# Patient Record
Sex: MALE | Race: WHITE | URBAN - METROPOLITAN AREA
[De-identification: names, ages, dates, MRNs, and addresses within clinical notes are randomized per-mention and may not be internally consistent; named-entity substitution may affect disease eponyms.]

---

## 2018-07-04 ENCOUNTER — INPATIENT (INPATIENT)
Facility: HOSPITAL | Age: 58
LOS: 0 days | Discharge: HOME | End: 2018-07-05
Attending: INTERNAL MEDICINE | Admitting: INTERNAL MEDICINE
Payer: COMMERCIAL

## 2018-07-04 VITALS
HEART RATE: 101 BPM | OXYGEN SATURATION: 99 % | SYSTOLIC BLOOD PRESSURE: 161 MMHG | TEMPERATURE: 98 F | WEIGHT: 195.11 LBS | HEIGHT: 71 IN | DIASTOLIC BLOOD PRESSURE: 93 MMHG | RESPIRATION RATE: 18 BRPM

## 2018-07-04 DIAGNOSIS — Z90.5 ACQUIRED ABSENCE OF KIDNEY: Chronic | ICD-10-CM

## 2018-07-04 LAB
ALBUMIN SERPL ELPH-MCNC: 4 G/DL — SIGNIFICANT CHANGE UP (ref 3.3–5)
ALP SERPL-CCNC: 70 U/L — SIGNIFICANT CHANGE UP (ref 40–120)
ALT FLD-CCNC: 48 U/L — SIGNIFICANT CHANGE UP (ref 12–78)
ANION GAP SERPL CALC-SCNC: 7 MMOL/L — SIGNIFICANT CHANGE UP (ref 5–17)
APTT BLD: 32.3 SEC — SIGNIFICANT CHANGE UP (ref 27.5–37.4)
AST SERPL-CCNC: 27 U/L — SIGNIFICANT CHANGE UP (ref 15–37)
BASOPHILS # BLD AUTO: 0.06 K/UL — SIGNIFICANT CHANGE UP (ref 0–0.2)
BASOPHILS NFR BLD AUTO: 0.7 % — SIGNIFICANT CHANGE UP (ref 0–2)
BILIRUB SERPL-MCNC: 1.3 MG/DL — HIGH (ref 0.2–1.2)
BLD GP AB SCN SERPL QL: SIGNIFICANT CHANGE UP
BUN SERPL-MCNC: 19 MG/DL — SIGNIFICANT CHANGE UP (ref 7–23)
CALCIUM SERPL-MCNC: 9.3 MG/DL — SIGNIFICANT CHANGE UP (ref 8.5–10.1)
CHLORIDE SERPL-SCNC: 105 MMOL/L — SIGNIFICANT CHANGE UP (ref 96–108)
CO2 SERPL-SCNC: 25 MMOL/L — SIGNIFICANT CHANGE UP (ref 22–31)
CREAT SERPL-MCNC: 1.04 MG/DL — SIGNIFICANT CHANGE UP (ref 0.5–1.3)
EOSINOPHIL # BLD AUTO: 0.09 K/UL — SIGNIFICANT CHANGE UP (ref 0–0.5)
EOSINOPHIL NFR BLD AUTO: 1.1 % — SIGNIFICANT CHANGE UP (ref 0–6)
GLUCOSE SERPL-MCNC: 264 MG/DL — HIGH (ref 70–99)
HCT VFR BLD CALC: 45.8 % — SIGNIFICANT CHANGE UP (ref 39–50)
HGB BLD-MCNC: 16.2 G/DL — SIGNIFICANT CHANGE UP (ref 13–17)
IMM GRANULOCYTES NFR BLD AUTO: 0.5 % — SIGNIFICANT CHANGE UP (ref 0–1.5)
INR BLD: 1.07 RATIO — SIGNIFICANT CHANGE UP (ref 0.88–1.16)
LACTATE SERPL-SCNC: 2 MMOL/L — SIGNIFICANT CHANGE UP (ref 0.7–2)
LYMPHOCYTES # BLD AUTO: 1.79 K/UL — SIGNIFICANT CHANGE UP (ref 1–3.3)
LYMPHOCYTES # BLD AUTO: 21.9 % — SIGNIFICANT CHANGE UP (ref 13–44)
MCHC RBC-ENTMCNC: 28.7 PG — SIGNIFICANT CHANGE UP (ref 27–34)
MCHC RBC-ENTMCNC: 35.4 GM/DL — SIGNIFICANT CHANGE UP (ref 32–36)
MCV RBC AUTO: 81.2 FL — SIGNIFICANT CHANGE UP (ref 80–100)
MONOCYTES # BLD AUTO: 0.71 K/UL — SIGNIFICANT CHANGE UP (ref 0–0.9)
MONOCYTES NFR BLD AUTO: 8.7 % — SIGNIFICANT CHANGE UP (ref 2–14)
NEUTROPHILS # BLD AUTO: 5.5 K/UL — SIGNIFICANT CHANGE UP (ref 1.8–7.4)
NEUTROPHILS NFR BLD AUTO: 67.1 % — SIGNIFICANT CHANGE UP (ref 43–77)
NRBC # BLD: 0 /100 WBCS — SIGNIFICANT CHANGE UP (ref 0–0)
PLATELET # BLD AUTO: 195 K/UL — SIGNIFICANT CHANGE UP (ref 150–400)
POTASSIUM SERPL-MCNC: 4.3 MMOL/L — SIGNIFICANT CHANGE UP (ref 3.5–5.3)
POTASSIUM SERPL-SCNC: 4.3 MMOL/L — SIGNIFICANT CHANGE UP (ref 3.5–5.3)
PROT SERPL-MCNC: 8 GM/DL — SIGNIFICANT CHANGE UP (ref 6–8.3)
PROTHROM AB SERPL-ACNC: 11.6 SEC — SIGNIFICANT CHANGE UP (ref 9.8–12.7)
RBC # BLD: 5.64 M/UL — SIGNIFICANT CHANGE UP (ref 4.2–5.8)
RBC # FLD: 13.3 % — SIGNIFICANT CHANGE UP (ref 10.3–14.5)
SODIUM SERPL-SCNC: 137 MMOL/L — SIGNIFICANT CHANGE UP (ref 135–145)
TYPE + AB SCN PNL BLD: SIGNIFICANT CHANGE UP
WBC # BLD: 8.19 K/UL — SIGNIFICANT CHANGE UP (ref 3.8–10.5)
WBC # FLD AUTO: 8.19 K/UL — SIGNIFICANT CHANGE UP (ref 3.8–10.5)

## 2018-07-04 PROCEDURE — 93010 ELECTROCARDIOGRAM REPORT: CPT

## 2018-07-04 PROCEDURE — 73620 X-RAY EXAM OF FOOT: CPT | Mod: 26

## 2018-07-04 PROCEDURE — 71045 X-RAY EXAM CHEST 1 VIEW: CPT | Mod: 26

## 2018-07-04 PROCEDURE — 99285 EMERGENCY DEPT VISIT HI MDM: CPT

## 2018-07-04 RX ORDER — SODIUM CHLORIDE 9 MG/ML
1000 INJECTION INTRAMUSCULAR; INTRAVENOUS; SUBCUTANEOUS
Qty: 0 | Refills: 0 | Status: COMPLETED | OUTPATIENT
Start: 2018-07-04 | End: 2018-07-05

## 2018-07-04 RX ORDER — DOCUSATE SODIUM 100 MG
100 CAPSULE ORAL THREE TIMES A DAY
Qty: 0 | Refills: 0 | Status: DISCONTINUED | OUTPATIENT
Start: 2018-07-04 | End: 2018-07-05

## 2018-07-04 RX ORDER — AMPICILLIN SODIUM AND SULBACTAM SODIUM 250; 125 MG/ML; MG/ML
3 INJECTION, POWDER, FOR SUSPENSION INTRAMUSCULAR; INTRAVENOUS EVERY 6 HOURS
Qty: 0 | Refills: 0 | Status: DISCONTINUED | OUTPATIENT
Start: 2018-07-04 | End: 2018-07-05

## 2018-07-04 RX ORDER — LISINOPRIL 2.5 MG/1
10 TABLET ORAL DAILY
Qty: 0 | Refills: 0 | Status: DISCONTINUED | OUTPATIENT
Start: 2018-07-04 | End: 2018-07-05

## 2018-07-04 RX ORDER — ENOXAPARIN SODIUM 100 MG/ML
40 INJECTION SUBCUTANEOUS EVERY 24 HOURS
Qty: 0 | Refills: 0 | Status: DISCONTINUED | OUTPATIENT
Start: 2018-07-04 | End: 2018-07-05

## 2018-07-04 RX ORDER — ACETAMINOPHEN 500 MG
650 TABLET ORAL EVERY 6 HOURS
Qty: 0 | Refills: 0 | Status: DISCONTINUED | OUTPATIENT
Start: 2018-07-04 | End: 2018-07-05

## 2018-07-04 RX ORDER — DEXTROSE 50 % IN WATER 50 %
25 SYRINGE (ML) INTRAVENOUS ONCE
Qty: 0 | Refills: 0 | Status: DISCONTINUED | OUTPATIENT
Start: 2018-07-04 | End: 2018-07-05

## 2018-07-04 RX ORDER — DEXTROSE 50 % IN WATER 50 %
15 SYRINGE (ML) INTRAVENOUS ONCE
Qty: 0 | Refills: 0 | Status: DISCONTINUED | OUTPATIENT
Start: 2018-07-04 | End: 2018-07-05

## 2018-07-04 RX ORDER — DEXTROSE 50 % IN WATER 50 %
12.5 SYRINGE (ML) INTRAVENOUS ONCE
Qty: 0 | Refills: 0 | Status: DISCONTINUED | OUTPATIENT
Start: 2018-07-04 | End: 2018-07-05

## 2018-07-04 RX ORDER — INSULIN LISPRO 100/ML
VIAL (ML) SUBCUTANEOUS
Qty: 0 | Refills: 0 | Status: DISCONTINUED | OUTPATIENT
Start: 2018-07-04 | End: 2018-07-05

## 2018-07-04 RX ORDER — PIPERACILLIN AND TAZOBACTAM 4; .5 G/20ML; G/20ML
3.38 INJECTION, POWDER, LYOPHILIZED, FOR SOLUTION INTRAVENOUS ONCE
Qty: 0 | Refills: 0 | Status: COMPLETED | OUTPATIENT
Start: 2018-07-04 | End: 2018-07-04

## 2018-07-04 RX ORDER — GLUCAGON INJECTION, SOLUTION 0.5 MG/.1ML
1 INJECTION, SOLUTION SUBCUTANEOUS ONCE
Qty: 0 | Refills: 0 | Status: DISCONTINUED | OUTPATIENT
Start: 2018-07-04 | End: 2018-07-05

## 2018-07-04 RX ORDER — SODIUM CHLORIDE 9 MG/ML
1000 INJECTION, SOLUTION INTRAVENOUS
Qty: 0 | Refills: 0 | Status: DISCONTINUED | OUTPATIENT
Start: 2018-07-04 | End: 2018-07-05

## 2018-07-04 RX ORDER — ONDANSETRON 8 MG/1
4 TABLET, FILM COATED ORAL EVERY 6 HOURS
Qty: 0 | Refills: 0 | Status: DISCONTINUED | OUTPATIENT
Start: 2018-07-04 | End: 2018-07-05

## 2018-07-04 RX ORDER — VANCOMYCIN HCL 1 G
1000 VIAL (EA) INTRAVENOUS ONCE
Qty: 0 | Refills: 0 | Status: COMPLETED | OUTPATIENT
Start: 2018-07-04 | End: 2018-07-04

## 2018-07-04 RX ORDER — SENNA PLUS 8.6 MG/1
2 TABLET ORAL AT BEDTIME
Qty: 0 | Refills: 0 | Status: DISCONTINUED | OUTPATIENT
Start: 2018-07-04 | End: 2018-07-05

## 2018-07-04 RX ADMIN — Medication 250 MILLIGRAM(S): at 16:15

## 2018-07-04 RX ADMIN — PIPERACILLIN AND TAZOBACTAM 200 GRAM(S): 4; .5 INJECTION, POWDER, LYOPHILIZED, FOR SOLUTION INTRAVENOUS at 15:50

## 2018-07-04 RX ADMIN — Medication 1 APPLICATION(S): at 20:55

## 2018-07-04 NOTE — ED ADULT NURSE NOTE - OBJECTIVE STATEMENT
presents sent from urgent care for left 5th toe pain. as per pt, he injured it a few days ago but now the redness is moving up his leg. denies fevers, chills.

## 2018-07-04 NOTE — H&P ADULT - ASSESSMENT
57 y/o M PMHx significant for diabetes mellitus type 2, HTN, s/p partial left nephrectomy for renal cell carcinoma who was referred to the ED from a local urgent care center for further evaluation of left 5th toe swelling and ascending erythema to the mid left lower leg . The patient states that he had a "blister which popped" on the left 5th toe on Monday (7/2) which he noticed after taking off his "work boots." The patient denies any associated fevers, chills, paresthesias, or numbness to the left leg/foot. In the ED Podiatry was consulted for further management 57 y/o M PMHx significant for diabetes mellitus type 2, HTN, s/p partial left nephrectomy for renal cell carcinoma who was referred to the ED from a local urgent care center for further evaluation of left 5th toe swelling and ascending erythema to the mid left lower leg . The patient states that he had a "blister which popped" on the left 5th toe on Monday (7/2) which he noticed after taking off his "work boots." The patient denies any associated fevers, chills, paresthesias, or numbness to the left leg/foot. In the ED Podiatry was consulted for further management.    #Cellulitis/Lymphangitis; Left 5th toe and left lower leg  #Superficial ulcer (grade 1); left foot 5th digit  ~admit to Medicine  ~f/u w/ Podiatry in the am  ~cont. wound care per Podiatry  ~f/u PAN C+S  ~cont. IV abx  ~f/u w/ ID consultation in the am    2)Diabetes Mellitus type 2  ~FS qAC/HS  ~cont. ADA diet  ~cont. ISS per protocol    3)HTN  ~cont. Lisinopril 10mg po daily    4)Vte ppx  ~cont. LMWH sq

## 2018-07-04 NOTE — H&P ADULT - HISTORY OF PRESENT ILLNESS
57 y/o M PMHx significant for diabetes mellitus type 2, HTN, s/p partial left nephrectomy for renal cell carcinoma who was referred to the ED from a local urgent care center for further evaluation of left 5th toe swelling and ascending erythema to the mid left lower leg . The patient states that he had a "blister which popped" on the left 5th toe on Monday (7/2) which he noticed after taking off his "work boots." The patient denies any associated fevers, chills, paresthesias, or numbness to the left leg/foot. In the ED Podiatry was consulted for further management.

## 2018-07-04 NOTE — CONSULT NOTE ADULT - SUBJECTIVE AND OBJECTIVE BOX
57 y/o male is seen and evaluated in the ED for red left 5th toe. Pt's wife is present at bedside. Pt states he noted a blister had popped on his left 5th toe on Monday after taking off his work boots. Pt states he was going to clean the wound but forgot. Pt states wife saw his red 5th toe today and took him to urgent care where they sent him to the ED. Pt denies any pain to his left 5th toe. Pt denies any drainage such as pus. Pt denies any fevers or chills. Pt states he vomited yesterday but it usually happens due to stress or anxiety. Pt denies any previous foot infections. Pt states he commonly gets cuts and scrapes on his lower legs due to his work as a . Pt denies any other pedal complaints. Pt states he has a history of left kidney tumor which was removed 15 years ago and has not had any issues since his surgery. Pt denies any recent travels. Pt is unsure of last HgBA1C. Pt denies any f/c/n/v/sob/headache/chest pain/abdominal pain.    PMHx: DM, HTN, history of left kidney tumor (removed)    PSH: Partial nephrectomy of left kidney, cholecystectomy    MEDICATIONS: Metformin    Allergies: NKFDA    Social Hx: Former smoker (quit over 20 years ago. Occasional alcohol consumption (social). Denies recreational drug use. Works as a . Lives with wife. Denies any recent travels    REVIEW OF SYSTEMS:  CONSTITUTIONAL: No weakness, fevers or chills  EYES/ENT: No visual changes;  No vertigo or throat pain   NECK: No pain or stiffness  RESPIRATORY: No cough, wheezing, hemoptysis; No shortness of breath  CARDIOVASCULAR: No chest pain or palpitations  GASTROINTESTINAL: No abdominal or epigastric pain. No nausea, vomiting, or hematemesis; No diarrhea or constipation. No melena or hematochezia.  GENITOURINARY: No dysuria, frequency or hematuria  NEUROLOGICAL: No numbness or weakness  SKIN: Redness, swelling of left foot 5th toe; streaking redness up left leg  All other review of systems is negative unless indicated above    Vital Signs Last 24 Hrs  T(C): 36.7 (04 Jul 2018 14:01), Max: 36.7 (04 Jul 2018 14:01)  T(F): 98 (04 Jul 2018 14:01), Max: 98 (04 Jul 2018 14:01)  HR: 101 (04 Jul 2018 14:01) (101 - 101)  BP: 161/93 (04 Jul 2018 14:01) (161/93 - 161/93)  BP(mean): --  RR: 18 (04 Jul 2018 14:01) (18 - 18)  SpO2: 99% (04 Jul 2018 14:01) (99% - 99%)    PHYSICAL EXAM:    Constitutional: NAD, awake and alert, well-developed  Extremities:   Vascular- DP and PT pulses are palpable 2/4 B/L. CFT < 3 sec x10. Mild increase in temperature of the left foot 5th digit.  Neuro- Protective sensation is diminished B/L. Light touch sensation is intact B/L.  Derm-   Left foot: Superficial ulceration noted on the dorsolateral aspect of the left foot 5th digit which measures approximately 1cm x0.7cm. Ulcer bas is granualr with no undermining or tunneling. Surrounding erythema noted of the left foot 5ht digit and it is noted to streak proximally to the anterior aspect of the left lower leg in a serpentine fashion. No active drainage noted. No purulence. No malodor. No fluctuance or crepitus palpated. No probe to bone. Mild edema noted of the left foot 5th digit. Toenails 1, 5 are thickened, yellow and dystrophic. Remaining toenails are clear and trimmed. No interdigital macerations or fissuring.  Right foot: Hyperkeratotic lesion noted of the medial plantar aspect of the right hallux with a superficial abrasion noted. No active drainage noted. No purulence. No malodor. No fluctuance or crepitus palpated. No probe to bone. Toenails 1, 5 are thickened, yellow and dystrophic. Remaining toenails are clear and trimmed. No interdigital macerations or fissuring.  Ortho- No pain noted upon palpation of the left foot 5th digit. No pain palpated upon any other regions of B/L feet. Muscle strength is 5/5 In DF, PF, inversion, eversion B/L. Compartments of the B/L lower legs and feet are soft and non tender. Negative Isabell/Homans signs. Contractures noted of the lesser digits 2-5 B/L. Prominent dorsal bony eminence of the right 1st MTPJ.     LABS: All Labs Reviewed:                        16.2   8.19  )-----------( 195      ( 04 Jul 2018 15:27 )             45.8     07-04    137  |  105  |  19  ----------------------------<  264<H>  4.3   |  25  |  1.04    Ca    9.3      04 Jul 2018 15:27    TPro  8.0  /  Alb  4.0  /  TBili  1.3<H>  /  DBili  x   /  AST  27  /  ALT  48  /  AlkPhos  70  07-04    PT/INR - ( 04 Jul 2018 15:27 )   PT: 11.6 sec;   INR: 1.07 ratio         PTT - ( 04 Jul 2018 15:27 )  PTT:32.3 sec      Blood Culture x2 (7/4/18): Pending     RADIOLOGY/EKG:  Left foot xrays (7/4/18): Official report pending. Initial podiatric review, no signs of soft tissue emphysema. No fracture, no dislocation.

## 2018-07-04 NOTE — CONSULT NOTE ADULT - ASSESSMENT
57 y/o male with PMH of DM, HTN, h/o malignant neoplasm of left kidney (partial nephrectomy) is seen and evaluated for:    1. Cellulitis/lymphangitis; left foot 5th toe and left lower leg  2. Superficial ulcer (grade 1); left foot 5th digit  3. Hyperkeratotic lesion; medial right hallux  4. Superficial abrasion; medial right hallux.  5. DM with neuropathy    - Pt was seen and examined. Plan discussed with attending Dr. Jones.  - Xrays of the left foot reviewed and discussed with pt. Initial pod review: no signs of soft tissue emphysema. Official radiology report pending.  - Left foot 5th digit wound cleansed with copious amounts of normal saline. Bacitracin applied to wound base and covered with DSD (gauze, peterson).  - Order placed for Silvadene for daily wound care.  - Cellulitis of the LLE marked at the urgent care center. Date written. Will monitor progression.  - Surgical shoe dispensed. Pt instructed to WBAT on the left foot with surgical shoe.  - Recommend IVabx per ID recommendations.  - Educated pt on the importance of diabetic foot care, including maintaining optimum glucose level and inspecting feet daily.  - Monitor labs and vitals. Order placed for HgBA1c.  - Care per Medicine, appreciated.  - Podiatry appreciates this consult.  - Podiatry to follow while in house. 57 y/o male with PMH of DM, HTN, h/o malignant neoplasm of left kidney (partial nephrectomy) is seen and evaluated for:    1. Cellulitis/lymphangitis; left foot 5th toe and left lower leg  2. Superficial ulcer (grade 1); left foot 5th digit  3. Callus; medial right hallux  4. Superficial abrasion; medial right hallux.  5. DM with neuropathy    - Pt was seen and examined. Plan discussed with attending Dr. Jones.  - Xrays of the left foot reviewed and discussed with pt. Initial pod review: no signs of soft tissue emphysema. Official radiology report pending.  - Left foot 5th digit wound cleansed with copious amounts of normal saline. Bacitracin applied to wound base and covered with DSD (gauze, peterson).  - Order placed for Silvadene for daily wound care.  - Cleasned callus with alcohol and performed sharp debridement of right hallux callus with sterile #15 blade; no underlying wound. Abrasion cleansed with normal saline, applied bacitracin and bandaid.  - Cellulitis of the LLE marked at the urgent care center. Date written. Will monitor progression.  - Surgical shoe dispensed. Pt instructed to WBAT on the left foot with surgical shoe.  - Recommend IVabx per ID recommendations.  - Educated pt on the importance of diabetic foot care, including maintaining optimum glucose level and inspecting feet daily.  - Monitor labs and vitals. Order placed for HgBA1c.  - Care per Medicine, appreciated.  - Podiatry appreciates this consult.  - Podiatry to follow while in house.

## 2018-07-04 NOTE — ED STATDOCS - OBJECTIVE STATEMENT
59 y/o male with PMHx of HTN, DM, s/p partial nephrectomy presents to the ED c/o L 5th toe redness tracking up LLE. Notes that he had noticed dead skin to toe earlier in the week. No other complaints in ED at this time. YAMILE. PCP/Dr. Perdue. Nonsmoker.

## 2018-07-04 NOTE — H&P ADULT - PSH
Basal Cell Carcinoma of Nose Excision  8/2008  History of Cholecystectomy  1997 Basal Cell Carcinoma of Nose Excision  8/2008  H/O partial nephrectomy  Left  History of Cholecystectomy  1997

## 2018-07-04 NOTE — ED STATDOCS - PROGRESS NOTE DETAILS
signed Christina Pitt PA-C Pt seen initially in intake by Dr Cueva.   Pt sent for eval of left 5th digit cellulitis with ascending lymphangitis after he felt a "pinch" on 7/2 in his boot while he was working. Now with 0.5cm shallow appearing ulcer with erythema of toe and ascending lymphangitis of foot and lower leg. Pt not on abx, denies fever/chills. No significant findings on xray. Plan admission for IV abx. Spoke to podiatry resident Tati, will see pt in consult. Pt agrees with admission and plan of care. PMH DM2, HTN. PMD Japanese

## 2018-07-04 NOTE — ED STATDOCS - ATTENDING CONTRIBUTION TO CARE
I, Gloria Cueva DO,  performed the initial face to face bedside interview with this patient regarding history of present illness, review of symptoms and relevant past medical, social and family history.  I completed an independent physical examination.  I was the initial provider who evaluated this patient. I have signed out the follow up of any pending tests (i.e. labs, radiological studies) to the ACP.  I have communicated the patient’s plan of care and disposition with the ACP.  The history, relevant review of systems, past medical and surgical history, medical decision making, and physical examination was documented by the scribe in my presence and I attest to the accuracy of the documentation.

## 2018-07-04 NOTE — H&P ADULT - NSHPPHYSICALEXAM_GEN_ALL_CORE
Vital Signs Last 24 Hrs  T(C): 36.8 (04 Jul 2018 21:08), Max: 36.8 (04 Jul 2018 21:08)  T(F): 98.3 (04 Jul 2018 21:08), Max: 98.3 (04 Jul 2018 21:08)  HR: 87 (04 Jul 2018 21:08) (87 - 101)  BP: 135/77 (04 Jul 2018 21:08) (135/77 - 161/93)  RR: 16 (04 Jul 2018 21:08) (16 - 18)  SpO2: 99% (04 Jul 2018 21:08) (99% - 99%)

## 2018-07-05 ENCOUNTER — TRANSCRIPTION ENCOUNTER (OUTPATIENT)
Age: 58
End: 2018-07-05

## 2018-07-05 VITALS
OXYGEN SATURATION: 100 % | SYSTOLIC BLOOD PRESSURE: 144 MMHG | DIASTOLIC BLOOD PRESSURE: 71 MMHG | HEART RATE: 77 BPM | TEMPERATURE: 98 F | RESPIRATION RATE: 17 BRPM

## 2018-07-05 LAB
ALBUMIN SERPL ELPH-MCNC: 3.4 G/DL — SIGNIFICANT CHANGE UP (ref 3.3–5)
ALP SERPL-CCNC: 60 U/L — SIGNIFICANT CHANGE UP (ref 40–120)
ALT FLD-CCNC: 37 U/L — SIGNIFICANT CHANGE UP (ref 12–78)
ANION GAP SERPL CALC-SCNC: 7 MMOL/L — SIGNIFICANT CHANGE UP (ref 5–17)
AST SERPL-CCNC: 18 U/L — SIGNIFICANT CHANGE UP (ref 15–37)
BASOPHILS # BLD AUTO: 0.03 K/UL — SIGNIFICANT CHANGE UP (ref 0–0.2)
BASOPHILS NFR BLD AUTO: 0.6 % — SIGNIFICANT CHANGE UP (ref 0–2)
BILIRUB SERPL-MCNC: 1.1 MG/DL — SIGNIFICANT CHANGE UP (ref 0.2–1.2)
BUN SERPL-MCNC: 14 MG/DL — SIGNIFICANT CHANGE UP (ref 7–23)
CALCIUM SERPL-MCNC: 8.6 MG/DL — SIGNIFICANT CHANGE UP (ref 8.5–10.1)
CHLORIDE SERPL-SCNC: 106 MMOL/L — SIGNIFICANT CHANGE UP (ref 96–108)
CO2 SERPL-SCNC: 26 MMOL/L — SIGNIFICANT CHANGE UP (ref 22–31)
CREAT SERPL-MCNC: 0.81 MG/DL — SIGNIFICANT CHANGE UP (ref 0.5–1.3)
EOSINOPHIL # BLD AUTO: 0.09 K/UL — SIGNIFICANT CHANGE UP (ref 0–0.5)
EOSINOPHIL NFR BLD AUTO: 1.8 % — SIGNIFICANT CHANGE UP (ref 0–6)
GLUCOSE BLDC GLUCOMTR-MCNC: 243 MG/DL — HIGH (ref 70–99)
GLUCOSE BLDC GLUCOMTR-MCNC: 257 MG/DL — HIGH (ref 70–99)
GLUCOSE BLDC GLUCOMTR-MCNC: 291 MG/DL — HIGH (ref 70–99)
GLUCOSE SERPL-MCNC: 224 MG/DL — HIGH (ref 70–99)
HBA1C BLD-MCNC: 9.9 % — HIGH (ref 4–5.6)
HCT VFR BLD CALC: 41.2 % — SIGNIFICANT CHANGE UP (ref 39–50)
HGB BLD-MCNC: 14.6 G/DL — SIGNIFICANT CHANGE UP (ref 13–17)
IMM GRANULOCYTES NFR BLD AUTO: 0.4 % — SIGNIFICANT CHANGE UP (ref 0–1.5)
LYMPHOCYTES # BLD AUTO: 1.63 K/UL — SIGNIFICANT CHANGE UP (ref 1–3.3)
LYMPHOCYTES # BLD AUTO: 32.2 % — SIGNIFICANT CHANGE UP (ref 13–44)
MAGNESIUM SERPL-MCNC: 1.9 MG/DL — SIGNIFICANT CHANGE UP (ref 1.6–2.6)
MCHC RBC-ENTMCNC: 28.6 PG — SIGNIFICANT CHANGE UP (ref 27–34)
MCHC RBC-ENTMCNC: 35.4 GM/DL — SIGNIFICANT CHANGE UP (ref 32–36)
MCV RBC AUTO: 80.8 FL — SIGNIFICANT CHANGE UP (ref 80–100)
MONOCYTES # BLD AUTO: 0.51 K/UL — SIGNIFICANT CHANGE UP (ref 0–0.9)
MONOCYTES NFR BLD AUTO: 10.1 % — SIGNIFICANT CHANGE UP (ref 2–14)
NEUTROPHILS # BLD AUTO: 2.78 K/UL — SIGNIFICANT CHANGE UP (ref 1.8–7.4)
NEUTROPHILS NFR BLD AUTO: 54.9 % — SIGNIFICANT CHANGE UP (ref 43–77)
NRBC # BLD: 0 /100 WBCS — SIGNIFICANT CHANGE UP (ref 0–0)
PLATELET # BLD AUTO: 165 K/UL — SIGNIFICANT CHANGE UP (ref 150–400)
POTASSIUM SERPL-MCNC: 4.1 MMOL/L — SIGNIFICANT CHANGE UP (ref 3.5–5.3)
POTASSIUM SERPL-SCNC: 4.1 MMOL/L — SIGNIFICANT CHANGE UP (ref 3.5–5.3)
PROT SERPL-MCNC: 6.9 GM/DL — SIGNIFICANT CHANGE UP (ref 6–8.3)
RBC # BLD: 5.1 M/UL — SIGNIFICANT CHANGE UP (ref 4.2–5.8)
RBC # FLD: 13.2 % — SIGNIFICANT CHANGE UP (ref 10.3–14.5)
SODIUM SERPL-SCNC: 139 MMOL/L — SIGNIFICANT CHANGE UP (ref 135–145)
WBC # BLD: 5.06 K/UL — SIGNIFICANT CHANGE UP (ref 3.8–10.5)
WBC # FLD AUTO: 5.06 K/UL — SIGNIFICANT CHANGE UP (ref 3.8–10.5)

## 2018-07-05 RX ORDER — DOCUSATE SODIUM 100 MG
1 CAPSULE ORAL
Qty: 0 | Refills: 0 | DISCHARGE
Start: 2018-07-05

## 2018-07-05 RX ORDER — SITAGLIPTIN 50 MG/1
1 TABLET, FILM COATED ORAL
Qty: 30 | Refills: 0
Start: 2018-07-05 | End: 2018-08-03

## 2018-07-05 RX ORDER — SENNA PLUS 8.6 MG/1
2 TABLET ORAL
Qty: 0 | Refills: 0 | DISCHARGE
Start: 2018-07-05

## 2018-07-05 RX ADMIN — AMPICILLIN SODIUM AND SULBACTAM SODIUM 200 GRAM(S): 250; 125 INJECTION, POWDER, FOR SUSPENSION INTRAMUSCULAR; INTRAVENOUS at 01:29

## 2018-07-05 RX ADMIN — Medication 2: at 08:52

## 2018-07-05 RX ADMIN — AMPICILLIN SODIUM AND SULBACTAM SODIUM 200 GRAM(S): 250; 125 INJECTION, POWDER, FOR SUSPENSION INTRAMUSCULAR; INTRAVENOUS at 08:53

## 2018-07-05 RX ADMIN — LISINOPRIL 10 MILLIGRAM(S): 2.5 TABLET ORAL at 05:46

## 2018-07-05 RX ADMIN — Medication 1 APPLICATION(S): at 14:15

## 2018-07-05 RX ADMIN — Medication 3: at 12:07

## 2018-07-05 RX ADMIN — SODIUM CHLORIDE 100 MILLILITER(S): 9 INJECTION INTRAMUSCULAR; INTRAVENOUS; SUBCUTANEOUS at 01:01

## 2018-07-05 NOTE — DISCHARGE NOTE ADULT - INSTRUCTIONS
diabetic diet Return to ER or call MD if you experience fever greater than 101, increased pain, swelling and redness. Continue medications as prescribed and follow up with doctors as instructed.

## 2018-07-05 NOTE — DISCHARGE NOTE ADULT - MEDICATION SUMMARY - MEDICATIONS TO TAKE
I will START or STAY ON the medications listed below when I get home from the hospital:    lisinopril 10 mg oral tablet  -- 1 tab(s) by mouth once a day  -- Indication: For for you rBP    glimepiride 4 mg oral tablet  -- 1 tab(s) by mouth once a day  -- Indication: For Diabetes    metFORMIN 500 mg oral tablet, extended release  -- 2 tab(s) by mouth 2 times a day  -- Indication: For Diabetes    silver sulfADIAZINE 1% topical cream  -- 1 application on skin once a day  -- Indication: For Apply to left 5th toe daily    docusate sodium 100 mg oral capsule  -- 1 cap(s) by mouth 3 times a day, As needed, Constipation  -- Indication: For Constipation    senna oral tablet  -- 2 tab(s) by mouth once a day (at bedtime), As needed, Constipation  -- Indication: For Constipation    Augmentin 875 mg-125 mg oral tablet  -- 1 tab(s) by mouth every 12 hours   -- Finish all this medication unless otherwise directed by prescriber.  Take with food or milk.    -- Indication: For Toe infection I will START or STAY ON the medications listed below when I get home from the hospital:    lisinopril 10 mg oral tablet  -- 1 tab(s) by mouth once a day  -- Indication: For for you rBP    glimepiride 4 mg oral tablet  -- 1 tab(s) by mouth once a day  -- Indication: For Diabetes    metFORMIN 500 mg oral tablet, extended release  -- 2 tab(s) by mouth 2 times a day  -- Indication: For Diabetes    Januvia 100 mg oral tablet  -- 1 tab(s) by mouth once a day   -- Do not drink alcoholic beverages when taking this medication.    -- Indication: For new medication you were started for diabetes.     silver sulfADIAZINE 1% topical cream  -- 1 application on skin once a day  -- Indication: For Apply to left 5th toe daily    docusate sodium 100 mg oral capsule  -- 1 cap(s) by mouth 3 times a day, As needed, Constipation  -- Indication: For Constipation    senna oral tablet  -- 2 tab(s) by mouth once a day (at bedtime), As needed, Constipation  -- Indication: For Constipation    Augmentin 875 mg-125 mg oral tablet  -- 1 tab(s) by mouth every 12 hours   -- Finish all this medication unless otherwise directed by prescriber.  Take with food or milk.    -- Indication: For Toe infection

## 2018-07-05 NOTE — DISCHARGE NOTE ADULT - PLAN OF CARE
continue metformin and GLimperide  your A1c is 9.9 resolution complete antibiotic regimen as prescribed. Complete the full course of antibiotics- do not skip or miss doses- do not stop even when you start to feel better.   Notify your PCP if your symptom has worsened or if you develop excessive diarrhea while on antibiotic.   f/u with podiatrist as an outpatient  apply silvadene to left foot once a day as directed- keep area clean and dry.   f/u with your PCP in 1 week continue metformin and GLimperide  your A1c is 9.9  you need much better glucose control to allow wound healing.   you also need yearly diabetic eye and foot exams.

## 2018-07-05 NOTE — DISCHARGE NOTE ADULT - CARE PLAN
Principal Discharge DX:	Cellulitis of toe, left  Goal:	resolution  Assessment and plan of treatment:	complete antibiotic regimen as prescribed. Complete the full course of antibiotics- do not skip or miss doses- do not stop even when you start to feel better.   Notify your PCP if your symptom has worsened or if you develop excessive diarrhea while on antibiotic.   f/u with podiatrist as an outpatient  apply silvadene to left foot once a day as directed- keep area clean and dry.   f/u with your PCP in 1 week  Secondary Diagnosis:	Type 2 diabetes mellitus with complication, unspecified whether long term insulin use  Assessment and plan of treatment:	continue metformin and GLimperide  your A1c is 9.9 Principal Discharge DX:	Cellulitis of toe, left  Goal:	resolution  Assessment and plan of treatment:	complete antibiotic regimen as prescribed. Complete the full course of antibiotics- do not skip or miss doses- do not stop even when you start to feel better.   Notify your PCP if your symptom has worsened or if you develop excessive diarrhea while on antibiotic.   f/u with podiatrist as an outpatient  apply silvadene to left foot once a day as directed- keep area clean and dry.   f/u with your PCP in 1 week  Secondary Diagnosis:	Type 2 diabetes mellitus with complication, unspecified whether long term insulin use  Assessment and plan of treatment:	continue metformin and GLimperide  your A1c is 9.9  you need much better glucose control to allow wound healing.   you also need yearly diabetic eye and foot exams.

## 2018-07-05 NOTE — DISCHARGE NOTE ADULT - PATIENT PORTAL LINK FT
You can access the [facility] Patient Portal, offered by [facility], by registering with the following website: [email or web address]

## 2018-07-05 NOTE — CONSULT NOTE ADULT - SUBJECTIVE AND OBJECTIVE BOX
Patient is a 58y old  Male who presents with a chief complaint of Left 5th Toe Pain (04 Jul 2018 22:21)    HPI:  57 y/o M PMHx significant for diabetes mellitus type 2, HTN, s/p partial left nephrectomy for renal cell carcinoma who was referred to the ED from a local urgent care center for further evaluation of left 5th toe swelling and ascending erythema to the mid left lower leg. The patient states that he had a "blister which popped" on the left 5th toe on Monday (7/2) which he noticed after taking off his "work boots." The patient denies any associated fevers, chills, paresthesias, or numbness to the left leg/foot. In the ED Podiatry was consulted, pt was given IV vancomycin/unasyn.       PMH: as above  PSH: as above  Meds: per reconciliation sheet, noted below  MEDICATIONS  (STANDING):  ampicillin/sulbactam  IVPB 3 Gram(s) IV Intermittent every 6 hours  dextrose 5%. 1000 milliLiter(s) (50 mL/Hr) IV Continuous <Continuous>  dextrose 50% Injectable 12.5 Gram(s) IV Push once  dextrose 50% Injectable 25 Gram(s) IV Push once  dextrose 50% Injectable 25 Gram(s) IV Push once  enoxaparin Injectable 40 milliGRAM(s) SubCutaneous every 24 hours  insulin lispro (HumaLOG) corrective regimen sliding scale   SubCutaneous three times a day before meals  lisinopril 10 milliGRAM(s) Oral daily  silver sulfADIAZINE 1% Cream 1 Application(s) Topical daily    Allergies    No Known Allergies    Intolerances      Social: no smoking, no alcohol, no illegal drugs; no recent travel, no exposure to TB  FAMILY HISTORY:     no history of premature cardiovascular disease in first degree relatives    ROS: the patient denies fever, no chills, no HA, no dizziness, no sore throat, no blurry vision, no CP, no palpitations, no SOB, no cough, no abdominal pain, no diarrhea, no N/V, no dysuria, no leg pain, no claudication, no rectal pain or bleeding, no night sweats  All other systems reviewed and are negative    Vital Signs Last 24 Hrs  T(C): 36.7 (05 Jul 2018 05:04), Max: 36.9 (05 Jul 2018 00:21)  T(F): 98.1 (05 Jul 2018 05:04), Max: 98.5 (05 Jul 2018 00:21)  HR: 79 (05 Jul 2018 05:04) (72 - 101)  BP: 142/75 (05 Jul 2018 05:04) (135/77 - 161/93)  BP(mean): --  RR: 17 (05 Jul 2018 05:04) (16 - 18)  SpO2: 100% (05 Jul 2018 05:04) (99% - 100%)  Daily Height in cm: 180.34 (04 Jul 2018 14:01)        PE:  Constitutional: frail looking  HEENT: NC/AT, EOMI, PERRLA, conjunctivae clear; ears and nose atraumatic; pharynx benign  Neck: supple; thyroid not palpable  Back: no tenderness  Respiratory: respiratory effort normal; clear to auscultation  Cardiovascular: S1S2 regular, no murmurs  Abdomen: soft, not tender, not distended, positive BS; liver and spleen WNL  Genitourinary: no suprapubic tenderness  Lymphatic: no LN palpable  Musculoskeletal: no muscle tenderness, no joint swelling or tenderness  Extremities: no pedal edema  Neurological/ Psychiatric: AxOx3, Judgement and insight normal;  moving all extremities  Skin: L 5th digit ulcer with surrounding erythema, edema, warmth, tenderness with ascending lymphangitis     Labs: all available labs reviewed                        14.6   5.06  )-----------( 165      ( 05 Jul 2018 07:58 )             41.2     07-05    139  |  106  |  14  ----------------------------<  224<H>  4.1   |  26  |  0.81    Ca    8.6      05 Jul 2018 07:58  Mg     1.9     07-05    TPro  6.9  /  Alb  3.4  /  TBili  1.1  /  DBili  x   /  AST  18  /  ALT  37  /  AlkPhos  60  07-05     LIVER FUNCTIONS - ( 05 Jul 2018 07:58 )  Alb: 3.4 g/dL / Pro: 6.9 gm/dL / ALK PHOS: 60 U/L / ALT: 37 U/L / AST: 18 U/L / GGT: x                 Radiology: all available radiological tests reviewed      EXAM:  XR CHEST AP OR PA 1V                            PROCEDURE DATE:  07/04/2018          INTERPRETATION:  Chest semierect single AP view:    Clinical history:    57 y/o male with PMHx of HTN, DM, s/p partial nephrectomy presents to the   ED c/o L 5th toe redness tracking up LLE. Notes that he had noticed dead   skin to toe earlier in the week.    Findings:    Cardiac size and configuration appear normal. Kina appear normal. Trachea   is midline. Lungs appear normal. CP angles appear normal. No   pneumothorax. Bones appear normal. Evidence of cholecystectomy.    Impression:    No radiologically active cardiopulmonary process seen.    < end of copied text >    Advanced directives addressed: full resuscitation

## 2018-07-05 NOTE — PROGRESS NOTE ADULT - SUBJECTIVE AND OBJECTIVE BOX
Subjective:    Initial Presentation: 59 y/o male is seen and evaluated in the ED for red left 5th toe. Pt's wife is present at bedside. Pt states he noted a blister had popped on his left 5th toe on Monday after taking off his work boots. Pt states he was going to clean the wound but forgot. Pt states wife saw his red 5th toe today and took him to urgent care where they sent him to the ED. Pt denies any pain to his left 5th toe. Pt denies any drainage such as pus. Pt denies any fevers or chills. Pt states he vomited yesterday but it usually happens due to stress or anxiety. Pt denies any previous foot infections. Pt states he commonly gets cuts and scrapes on his lower legs due to his work as a . Pt denies any other pedal complaints. Pt states he has a history of left kidney tumor which was removed 15 years ago and has not had any issues since his surgery. Pt denies any recent travels. Pt is unsure of last HgBA1C. Pt denies any f/c/n/v/sob/headache/chest pain/abdominal pain.    Today 7/5/2018, Patient is seen at bedside. Patient is resting comfortably, in NAD, and denies any overnight events. Patient states that he is having minimal pain and tenderness in his left foot. Patient states the dressing applied yesterday remained clean, dry and intact. Patient denies any f/n/v/c/sob/abdominal pain at this time. Patient also states that the band-applied to his right foot on the right great toe has remained c/d/i as well. Patient denies any pains in the right foot.   PMH:    Allergies    No Known Allergies      Vital Signs Last 24 Hrs  T(C): 36.7 (05 Jul 2018 05:04), Max: 36.9 (05 Jul 2018 00:21)  T(F): 98.1 (05 Jul 2018 05:04), Max: 98.5 (05 Jul 2018 00:21)  HR: 79 (05 Jul 2018 05:04) (72 - 101)  BP: 142/75 (05 Jul 2018 05:04) (135/77 - 161/93)  BP(mean): --  RR: 17 (05 Jul 2018 05:04) (16 - 18)  SpO2: 100% (05 Jul 2018 05:04) (99% - 100%)    I&O's Summary    05 Jul 2018 07:01  -  05 Jul 2018 10:41  --------------------------------------------------------  IN: 450 mL / OUT: 0 mL / NET: 450 mL        CAPILLARY BLOOD GLUCOSE    POCT Blood Glucose.: 243 mg/dL (05 Jul 2018 08:44)  POCT Blood Glucose.: 257 mg/dL (05 Jul 2018 00:53)          PHYSICAL EXAM:    Constitutional: NAD, awake and alert, well-developed  Extremities:   Vascular- DP and PT pulses are palpable 2/4 B/L. CFT < 3 sec x10. Temperature gradient warm to cool b/l.   Neuro- Protective sensation is diminished B/L. Light touch sensation is intact B/L.  Derm-   Left foot: Superficial ulceration noted on the dorsolateral aspect of the left foot 5th digit which measures approximately 1cm x0.7cm. Ulcer bas is granular with no undermining or tunneling. Surrounding erythema noted of the left foot 5t digit and it is noted to streak proximally to the anterior aspect of the left 1/3 aspect of the left leg in a serpentine fashion. No active drainage noted. No purulence. No malodor. No fluctuance or crepitus palpated. No probe to bone. Mild edema noted of the left foot 5th digit. Toenails 1, 5 are thickened, yellow and dystrophic. Remaining toenails are clear and trimmed. No interdigital macerations or fissuring.  Right foot: Hyperkeratotic lesion noted of the medial plantar aspect of the right hallux with a superficial abrasion noted. No active drainage noted. No purulence. No malodor. No fluctuance or crepitus palpated. No probe to bone. Toenails 1, 5 are thickened, yellow and dystrophic. Remaining toenails are clear and trimmed. No interdigital macerations or fissuring.  Ortho- No pain noted upon palpation of the left foot 5th digit. No pain palpated upon any other regions of B/L feet. Muscle strength is 5/5 In DF, PF, inversion, eversion B/L. Compartments of the B/L lower legs and feet are soft and non tender. Negative Isabell/Homans signs. Contractures noted of the lesser digits 2-5 B/L. Prominent dorsal bony eminence of the right 1st MTPJ.       MEDICATIONS:  MEDICATIONS  (STANDING):  ampicillin/sulbactam  IVPB 3 Gram(s) IV Intermittent every 6 hours  dextrose 5%. 1000 milliLiter(s) (50 mL/Hr) IV Continuous <Continuous>  dextrose 50% Injectable 12.5 Gram(s) IV Push once  dextrose 50% Injectable 25 Gram(s) IV Push once  dextrose 50% Injectable 25 Gram(s) IV Push once  enoxaparin Injectable 40 milliGRAM(s) SubCutaneous every 24 hours  insulin lispro (HumaLOG) corrective regimen sliding scale   SubCutaneous three times a day before meals  lisinopril 10 milliGRAM(s) Oral daily  silver sulfADIAZINE 1% Cream 1 Application(s) Topical daily      LABS: All Labs Reviewed:                        14.6   5.06  )-----------( 165      ( 05 Jul 2018 07:58 )             41.2     07-05    139  |  106  |  14  ----------------------------<  224<H>  4.1   |  26  |  0.81    Ca    8.6      05 Jul 2018 07:58  Mg     1.9     07-05    TPro  6.9  /  Alb  3.4  /  TBili  1.1  /  DBili  x   /  AST  18  /  ALT  37  /  AlkPhos  60  07-05    PT/INR - ( 04 Jul 2018 15:27 )   PT: 11.6 sec;   INR: 1.07 ratio         PTT - ( 04 Jul 2018 15:27 )  PTT:32.3 sec      Blood Culture:   Still pending from 7/4/2018    RADIOLOGY/EKG:    < from: Xray Foot AP + Lateral, Left (07.04.18 @ 15:26) >  XAM:  XR FOOT-LEFT-2 VIEWS                            PROCEDURE DATE:  07/04/2018          INTERPRETATION:    Left foot AP, lateral and oblique 3 views:    Clinical History:    Pain.59 y/o male with PMHx of HTN, DM, s/p partial nephrectomy presents   to the ED c/o L 5th toe redness tracking up LLE. Notes that he had   noticed dead skin to toe earlier in the week.    Findings:    Plantar and posterior calcaneal spurs. Otherwise, Bones and joints appear   normal. No evidence of acute fracture ordislocation. No focal osteolytic   or sclerotic lesions. Bone density appears normal. Soft tissues within   normal limits. No radiopaque foreign body seen.    Impression:  Calcaneal spurs.  No acute fracture or dislocation.    < end of copied text >

## 2018-07-05 NOTE — CONSULT NOTE ADULT - ASSESSMENT
57 y/o M PMHx significant for diabetes mellitus type 2, HTN, s/p partial left nephrectomy for renal cell carcinoma who was referred to the ED from a local urgent care center for further evaluation of left 5th toe swelling and ascending erythema to the mid left lower leg. The patient states that he had a "blister which popped" on the left 5th toe on Monday (7/2) which he noticed after taking off his "work boots." The patient denies any associated fevers, chills, paresthesias, or numbness to the left leg/foot. In the ED Podiatry was consulted, pt was given IV vancomycin/unasyn.     1. L 5th digit ulcer/cellulitis/ascending lymphangitis   - s/p vancomycin/unasyn  - appreciated podiatry eval  - improving  - f/u cultures  - can dc with oral augmentin [id number] to complete 7-10 day course  - monitor temps  - tolerating abx well so far; no side effects noted  - reason for abx use and side effects reviewed with patient  - supportive care  - f/u cbc    2. other issues - care per medicine 57 y/o M PMHx significant for diabetes mellitus type 2, HTN, s/p partial left nephrectomy for renal cell carcinoma who was referred to the ED from a local urgent care center for further evaluation of left 5th toe swelling and ascending erythema to the mid left lower leg. The patient states that he had a "blister which popped" on the left 5th toe on Monday (7/2) which he noticed after taking off his "work boots." The patient denies any associated fevers, chills, paresthesias, or numbness to the left leg/foot. In the ED Podiatry was consulted, pt was given IV vancomycin/unasyn.     1. L 5th toe ulcer/cellulitis/ascending lymphangitis   - s/p vancomycin/unasyn  - appreciated podiatry eval  - improving  - f/u cultures  - can dc with oral augmentin [id number] to complete 7-10 day course  - monitor temps  - tolerating abx well so far; no side effects noted  - reason for abx use and side effects reviewed with patient  - supportive care  - f/u cbc    2. other issues - care per medicine

## 2018-07-05 NOTE — DISCHARGE NOTE ADULT - HOSPITAL COURSE
59 y/o M PMHx significant for diabetes mellitus type 2, HTN, s/p partial left nephrectomy for renal cell carcinoma who was referred to the ED from a local urgent care center for further evaluation of left 5th toe swelling and ascending erythema to the mid left lower leg. The patient states that he had a "blister which popped" on the left 5th toe on Monday (7/2) which he noticed after taking off his "work boots." The patient denies any associated fevers, chills, paresthesias, or numbness to the left leg/foot. In the ED Podiatry was consulted, pt was given IV vancomycin/unasyn.    Pt was admitted to medicine and infection improved. He was seen by podiatry who made final recommendations for wound care and by ID for antibiotic selection. Medically stable and in agreement with discharge plan. A1C resulted prior to discharge and insulin recommended to patient to improve wound healing but he declined. Januvia was added.     Discharge diagnoses    Diabetic foot ulcer with superimposed cellulitis and lymphangitis  DM type 2 with vascular complication, neuropathy  HTN  remote RCC    total time, including coordination of care and discussion with podiatry and ID: 40 minutes  GEN: NAD  EYES: eomi  CV: no edema  RESP: unlabored

## 2018-07-05 NOTE — DISCHARGE NOTE ADULT - ADDITIONAL INSTRUCTIONS
PCP- follow up in 1 week. Bring these papers with you to that appointment so your PCP can request records from your hospital stay. You should bring your glucose log to this appointment.    Follow up with podiatry in 1 week. Wear surgical shoe for weight bearing. Wound care as directed by podiatry.

## 2018-07-05 NOTE — PROGRESS NOTE ADULT - ASSESSMENT
59 y/o male with PMH of DM, HTN, h/o malignant neoplasm of left kidney (partial nephrectomy) is seen and evaluated for:    1. Cellulitis/lymphangitis; left foot 5th toe and left lower leg  2. Superficial ulcer (grade 1); left foot 5th digit  3. Callus; medial right hallux  4. Superficial abrasion; medial right hallux.  5. DM with neuropathy    - Pt was seen and examined. Plan discussed with attending Dr. Jones.  - Official X-ray report shows no fractures or dislocations and soft tissues within normal limits.  - Left foot 5th digit wound cleansed with copious amounts of normal saline. Bacitracin applied to wound base and covered with DSD (gauze, peterson).  - Order placed for Silvadene for daily wound care.  -Applied Bacitracin to the medial IPJ area of the right hallux that had been sharp debrided yesterday 7/4/18. Band-aide was then applied to patient's right hallux.   - Cellulitis of the LLE marked at the urgent care center. Date written. Will monitor progression.  - Surgical shoe dispensed. Pt instructed to WBAT on the left foot with surgical shoe.  - Recommend IVabx per ID recommendations.  - Educated pt on the importance of diabetic foot care, including maintaining optimum glucose level and inspecting feet daily.  - Monitor labs and vitals. Order placed for HgBA1c but still not available.  - Care per Medicine, appreciated.  - Podiatry appreciates this consult.  - Podiatry to follow while in house. 59 y/o male with PMH of DM, HTN, h/o malignant neoplasm of left kidney (partial nephrectomy) is seen and evaluated for:    1. Cellulitis/lymphangitis; left foot 5th toe and left lower leg  2. Superficial ulcer (grade 1); left foot 5th digit  3. Callus; medial right hallux  4. Superficial abrasion; medial right hallux.  5. DM with neuropathy    - Pt was seen and examined with attending Dr. Jones.  - Official X-ray report shows no fractures or dislocations and soft tissues within normal limits.  - Left foot 5th digit wound cleansed with copious amounts of normal saline.   - Applied Silvadene to the dorsolateral IPJ area of the left 5th toe and dressed with DSD. Band-aide and bacitracin applied to patient's right hallux.   - Cellulitis of the LLE marked at the urgent care center. Date written. Will monitor progression.  - Surgical shoe dispensed. Pt instructed to WBAT on the left foot with surgical shoe at all times when ambulating  - Recommend IVabx per ID recommendations.  - Educated pt on the importance of diabetic foot care, including maintaining optimum glucose level and inspecting feet daily.  - Monitor labs and vitals. Order placed for HgBA1c but still not available.  - Care per Medicine, appreciated.  - Podiatry appreciates this consult.   - patient stable from Podiatry standpoint to be D/C'd based on recommendations of Medicine. Patient to be D/C'd with antibiotics per ID recommendations. Upon discharge, patient instructed to wear surgical shoe at all times when ambulating.   - Patient demonstrated verbal understanding of all interventions and understood all wound care instructions for Discharge.  - Podiatry to follow while in house.

## 2018-07-09 LAB
CULTURE RESULTS: SIGNIFICANT CHANGE UP
CULTURE RESULTS: SIGNIFICANT CHANGE UP
SPECIMEN SOURCE: SIGNIFICANT CHANGE UP
SPECIMEN SOURCE: SIGNIFICANT CHANGE UP

## 2018-07-10 DIAGNOSIS — E11.40 TYPE 2 DIABETES MELLITUS WITH DIABETIC NEUROPATHY, UNSPECIFIED: ICD-10-CM

## 2018-07-10 DIAGNOSIS — E11.621 TYPE 2 DIABETES MELLITUS WITH FOOT ULCER: ICD-10-CM

## 2018-07-10 DIAGNOSIS — Z87.891 PERSONAL HISTORY OF NICOTINE DEPENDENCE: ICD-10-CM

## 2018-07-10 DIAGNOSIS — Z85.528 PERSONAL HISTORY OF OTHER MALIGNANT NEOPLASM OF KIDNEY: ICD-10-CM

## 2018-07-10 DIAGNOSIS — E11.65 TYPE 2 DIABETES MELLITUS WITH HYPERGLYCEMIA: ICD-10-CM

## 2018-07-10 DIAGNOSIS — E11.51 TYPE 2 DIABETES MELLITUS WITH DIABETIC PERIPHERAL ANGIOPATHY WITHOUT GANGRENE: ICD-10-CM

## 2018-07-10 DIAGNOSIS — Z85.828 PERSONAL HISTORY OF OTHER MALIGNANT NEOPLASM OF SKIN: ICD-10-CM

## 2018-07-10 DIAGNOSIS — L03.032 CELLULITIS OF LEFT TOE: ICD-10-CM

## 2018-07-10 DIAGNOSIS — I10 ESSENTIAL (PRIMARY) HYPERTENSION: ICD-10-CM

## 2018-07-10 DIAGNOSIS — Z90.5 ACQUIRED ABSENCE OF KIDNEY: ICD-10-CM

## 2018-07-10 DIAGNOSIS — Z79.84 LONG TERM (CURRENT) USE OF ORAL HYPOGLYCEMIC DRUGS: ICD-10-CM

## 2018-07-10 DIAGNOSIS — L97.529 NON-PRESSURE CHRONIC ULCER OF OTHER PART OF LEFT FOOT WITH UNSPECIFIED SEVERITY: ICD-10-CM

## 2018-07-17 ENCOUNTER — APPOINTMENT (OUTPATIENT)
Dept: ULTRASOUND IMAGING | Facility: CLINIC | Age: 58
End: 2018-07-17

## 2018-09-01 PROBLEM — I10 ESSENTIAL (PRIMARY) HYPERTENSION: Chronic | Status: ACTIVE | Noted: 2018-07-04

## 2018-09-04 ENCOUNTER — FORM ENCOUNTER (OUTPATIENT)
Age: 58
End: 2018-09-04

## 2018-09-05 ENCOUNTER — APPOINTMENT (OUTPATIENT)
Dept: ULTRASOUND IMAGING | Facility: CLINIC | Age: 58
End: 2018-09-05

## 2018-09-05 ENCOUNTER — OUTPATIENT (OUTPATIENT)
Dept: OUTPATIENT SERVICES | Facility: HOSPITAL | Age: 58
LOS: 1 days | End: 2018-09-05
Payer: COMMERCIAL

## 2018-09-05 DIAGNOSIS — Z90.5 ACQUIRED ABSENCE OF KIDNEY: Chronic | ICD-10-CM

## 2018-09-05 DIAGNOSIS — Z00.8 ENCOUNTER FOR OTHER GENERAL EXAMINATION: ICD-10-CM

## 2018-09-05 PROCEDURE — 76700 US EXAM ABDOM COMPLETE: CPT

## 2018-09-05 PROCEDURE — 76700 US EXAM ABDOM COMPLETE: CPT | Mod: 26

## 2018-09-11 ENCOUNTER — APPOINTMENT (OUTPATIENT)
Dept: UROLOGY | Facility: CLINIC | Age: 58
End: 2018-09-11
Payer: COMMERCIAL

## 2018-09-11 VITALS
BODY MASS INDEX: 28 KG/M2 | DIASTOLIC BLOOD PRESSURE: 92 MMHG | WEIGHT: 200 LBS | SYSTOLIC BLOOD PRESSURE: 195 MMHG | HEIGHT: 71 IN | TEMPERATURE: 98.7 F | HEART RATE: 91 BPM | RESPIRATION RATE: 17 BRPM

## 2018-09-11 PROCEDURE — 99214 OFFICE O/P EST MOD 30 MIN: CPT

## 2021-07-16 DIAGNOSIS — Z12.5 ENCOUNTER FOR SCREENING FOR MALIGNANT NEOPLASM OF PROSTATE: ICD-10-CM

## 2021-07-31 LAB
ANION GAP SERPL CALC-SCNC: 12 MMOL/L
BUN SERPL-MCNC: 18 MG/DL
CALCIUM SERPL-MCNC: 9.5 MG/DL
CHLORIDE SERPL-SCNC: 104 MMOL/L
CO2 SERPL-SCNC: 22 MMOL/L
CREAT SERPL-MCNC: 0.85 MG/DL
GLUCOSE SERPL-MCNC: 206 MG/DL
POTASSIUM SERPL-SCNC: 4.6 MMOL/L
PSA SERPL-MCNC: 3.48 NG/ML
SODIUM SERPL-SCNC: 138 MMOL/L

## 2021-09-24 ENCOUNTER — APPOINTMENT (OUTPATIENT)
Dept: UROLOGY | Facility: CLINIC | Age: 61
End: 2021-09-24
Payer: COMMERCIAL

## 2021-09-24 VITALS
RESPIRATION RATE: 17 BRPM | HEART RATE: 98 BPM | SYSTOLIC BLOOD PRESSURE: 170 MMHG | HEIGHT: 71 IN | TEMPERATURE: 98.6 F | DIASTOLIC BLOOD PRESSURE: 74 MMHG | BODY MASS INDEX: 28 KG/M2 | WEIGHT: 200 LBS

## 2021-09-24 DIAGNOSIS — C64.9 MALIGNANT NEOPLASM OF UNSPECIFIED KIDNEY, EXCEPT RENAL PELVIS: ICD-10-CM

## 2021-09-24 PROCEDURE — 99213 OFFICE O/P EST LOW 20 MIN: CPT

## 2021-12-10 NOTE — PATIENT PROFILE ADULT. - BILL OF RIGHTS/ADMISSION INFORMATION PROVIDED TO:
Patient' wife called for refill.     Last seen 6/28/2021  Next appt Visit date not found  Elena Pruitt/BEV LI
Patient

## 2022-02-14 ENCOUNTER — RESULT REVIEW (OUTPATIENT)
Age: 62
End: 2022-02-14

## 2022-03-02 ENCOUNTER — APPOINTMENT (OUTPATIENT)
Dept: CT IMAGING | Facility: CLINIC | Age: 62
End: 2022-03-02
Payer: COMMERCIAL

## 2022-03-02 ENCOUNTER — OUTPATIENT (OUTPATIENT)
Dept: OUTPATIENT SERVICES | Facility: HOSPITAL | Age: 62
LOS: 1 days | End: 2022-03-02
Payer: COMMERCIAL

## 2022-03-02 DIAGNOSIS — Z90.5 ACQUIRED ABSENCE OF KIDNEY: Chronic | ICD-10-CM

## 2022-03-02 DIAGNOSIS — C64.9 MALIGNANT NEOPLASM OF UNSPECIFIED KIDNEY, EXCEPT RENAL PELVIS: ICD-10-CM

## 2022-03-02 PROCEDURE — 74170 CT ABD WO CNTRST FLWD CNTRST: CPT | Mod: 26

## 2022-03-02 PROCEDURE — 82565 ASSAY OF CREATININE: CPT

## 2022-03-02 PROCEDURE — 71260 CT THORAX DX C+: CPT

## 2022-03-02 PROCEDURE — 71260 CT THORAX DX C+: CPT | Mod: 26

## 2022-03-02 PROCEDURE — 74170 CT ABD WO CNTRST FLWD CNTRST: CPT

## 2022-03-14 ENCOUNTER — INPATIENT (INPATIENT)
Facility: HOSPITAL | Age: 62
LOS: 3 days | Discharge: HOME HEALTH | DRG: 872 | End: 2022-03-18
Attending: INTERNAL MEDICINE | Admitting: INTERNAL MEDICINE
Payer: COMMERCIAL

## 2022-03-14 VITALS — WEIGHT: 190.04 LBS | HEIGHT: 71 IN

## 2022-03-14 DIAGNOSIS — Z90.5 ACQUIRED ABSENCE OF KIDNEY: Chronic | ICD-10-CM

## 2022-03-14 DIAGNOSIS — M86.10 OTHER ACUTE OSTEOMYELITIS, UNSPECIFIED SITE: ICD-10-CM

## 2022-03-14 LAB
ALBUMIN SERPL ELPH-MCNC: 3.3 G/DL — SIGNIFICANT CHANGE UP (ref 3.3–5)
ALP SERPL-CCNC: 80 U/L — SIGNIFICANT CHANGE UP (ref 40–120)
ALT FLD-CCNC: 28 U/L — SIGNIFICANT CHANGE UP (ref 12–78)
ANION GAP SERPL CALC-SCNC: 7 MMOL/L — SIGNIFICANT CHANGE UP (ref 5–17)
APPEARANCE UR: CLEAR — SIGNIFICANT CHANGE UP
APTT BLD: 29.7 SEC — SIGNIFICANT CHANGE UP (ref 27.5–35.5)
AST SERPL-CCNC: 14 U/L — LOW (ref 15–37)
BACTERIA # UR AUTO: ABNORMAL
BASOPHILS # BLD AUTO: 0.05 K/UL — SIGNIFICANT CHANGE UP (ref 0–0.2)
BASOPHILS NFR BLD AUTO: 0.3 % — SIGNIFICANT CHANGE UP (ref 0–2)
BILIRUB SERPL-MCNC: 1 MG/DL — SIGNIFICANT CHANGE UP (ref 0.2–1.2)
BILIRUB UR-MCNC: NEGATIVE — SIGNIFICANT CHANGE UP
BUN SERPL-MCNC: 26 MG/DL — HIGH (ref 7–23)
CALCIUM SERPL-MCNC: 9.4 MG/DL — SIGNIFICANT CHANGE UP (ref 8.5–10.1)
CHLORIDE SERPL-SCNC: 100 MMOL/L — SIGNIFICANT CHANGE UP (ref 96–108)
CO2 SERPL-SCNC: 23 MMOL/L — SIGNIFICANT CHANGE UP (ref 22–31)
COLOR SPEC: YELLOW — SIGNIFICANT CHANGE UP
CREAT SERPL-MCNC: 1.3 MG/DL — SIGNIFICANT CHANGE UP (ref 0.5–1.3)
CRP SERPL-MCNC: 189 MG/L — HIGH
DIFF PNL FLD: ABNORMAL
EGFR: 63 ML/MIN/1.73M2 — SIGNIFICANT CHANGE UP
EOSINOPHIL # BLD AUTO: 0 K/UL — SIGNIFICANT CHANGE UP (ref 0–0.5)
EOSINOPHIL NFR BLD AUTO: 0 % — SIGNIFICANT CHANGE UP (ref 0–6)
EPI CELLS # UR: SIGNIFICANT CHANGE UP
ERYTHROCYTE [SEDIMENTATION RATE] IN BLOOD: 41 MM/HR — HIGH (ref 0–20)
FLUAV AG NPH QL: SIGNIFICANT CHANGE UP
FLUBV AG NPH QL: SIGNIFICANT CHANGE UP
GLUCOSE SERPL-MCNC: 421 MG/DL — HIGH (ref 70–99)
GLUCOSE UR QL: 1000 MG/DL
HCT VFR BLD CALC: 41.1 % — SIGNIFICANT CHANGE UP (ref 39–50)
HGB BLD-MCNC: 14 G/DL — SIGNIFICANT CHANGE UP (ref 13–17)
IMM GRANULOCYTES NFR BLD AUTO: 0.5 % — SIGNIFICANT CHANGE UP (ref 0–1.5)
INR BLD: 1.26 RATIO — HIGH (ref 0.88–1.16)
KETONES UR-MCNC: ABNORMAL
LACTATE SERPL-SCNC: 1.7 MMOL/L — SIGNIFICANT CHANGE UP (ref 0.7–2)
LEUKOCYTE ESTERASE UR-ACNC: NEGATIVE — SIGNIFICANT CHANGE UP
LYMPHOCYTES # BLD AUTO: 0.73 K/UL — LOW (ref 1–3.3)
LYMPHOCYTES # BLD AUTO: 4.7 % — LOW (ref 13–44)
MCHC RBC-ENTMCNC: 27.5 PG — SIGNIFICANT CHANGE UP (ref 27–34)
MCHC RBC-ENTMCNC: 34.1 GM/DL — SIGNIFICANT CHANGE UP (ref 32–36)
MCV RBC AUTO: 80.6 FL — SIGNIFICANT CHANGE UP (ref 80–100)
MONOCYTES # BLD AUTO: 1.24 K/UL — HIGH (ref 0–0.9)
MONOCYTES NFR BLD AUTO: 8 % — SIGNIFICANT CHANGE UP (ref 2–14)
NEUTROPHILS # BLD AUTO: 13.42 K/UL — HIGH (ref 1.8–7.4)
NEUTROPHILS NFR BLD AUTO: 86.5 % — HIGH (ref 43–77)
NITRITE UR-MCNC: NEGATIVE — SIGNIFICANT CHANGE UP
PH UR: 5 — SIGNIFICANT CHANGE UP (ref 5–8)
PLATELET # BLD AUTO: 287 K/UL — SIGNIFICANT CHANGE UP (ref 150–400)
POTASSIUM SERPL-MCNC: 4.8 MMOL/L — SIGNIFICANT CHANGE UP (ref 3.5–5.3)
POTASSIUM SERPL-SCNC: 4.8 MMOL/L — SIGNIFICANT CHANGE UP (ref 3.5–5.3)
PROT SERPL-MCNC: 8.4 GM/DL — HIGH (ref 6–8.3)
PROT UR-MCNC: 30 MG/DL
PROTHROM AB SERPL-ACNC: 14.6 SEC — HIGH (ref 10.5–13.4)
RBC # BLD: 5.1 M/UL — SIGNIFICANT CHANGE UP (ref 4.2–5.8)
RBC # FLD: 13.2 % — SIGNIFICANT CHANGE UP (ref 10.3–14.5)
RBC CASTS # UR COMP ASSIST: ABNORMAL /HPF (ref 0–4)
RSV RNA NPH QL NAA+NON-PROBE: SIGNIFICANT CHANGE UP
SARS-COV-2 RNA SPEC QL NAA+PROBE: SIGNIFICANT CHANGE UP
SODIUM SERPL-SCNC: 130 MMOL/L — LOW (ref 135–145)
SP GR SPEC: 1.01 — SIGNIFICANT CHANGE UP (ref 1.01–1.02)
UROBILINOGEN FLD QL: 4
WBC # BLD: 15.51 K/UL — HIGH (ref 3.8–10.5)
WBC # FLD AUTO: 15.51 K/UL — HIGH (ref 3.8–10.5)
WBC UR QL: SIGNIFICANT CHANGE UP

## 2022-03-14 PROCEDURE — 82962 GLUCOSE BLOOD TEST: CPT

## 2022-03-14 PROCEDURE — 85027 COMPLETE CBC AUTOMATED: CPT

## 2022-03-14 PROCEDURE — 93923 UPR/LXTR ART STDY 3+ LVLS: CPT

## 2022-03-14 PROCEDURE — 99253 IP/OBS CNSLTJ NEW/EST LOW 45: CPT | Mod: GC

## 2022-03-14 PROCEDURE — 83036 HEMOGLOBIN GLYCOSYLATED A1C: CPT

## 2022-03-14 PROCEDURE — 80048 BASIC METABOLIC PNL TOTAL CA: CPT

## 2022-03-14 PROCEDURE — 86803 HEPATITIS C AB TEST: CPT

## 2022-03-14 PROCEDURE — 73630 X-RAY EXAM OF FOOT: CPT | Mod: 26,RT

## 2022-03-14 PROCEDURE — 73720 MRI LWR EXTREMITY W/O&W/DYE: CPT | Mod: RT

## 2022-03-14 PROCEDURE — 87077 CULTURE AEROBIC IDENTIFY: CPT

## 2022-03-14 PROCEDURE — A9579: CPT

## 2022-03-14 PROCEDURE — 36415 COLL VENOUS BLD VENIPUNCTURE: CPT

## 2022-03-14 PROCEDURE — 87086 URINE CULTURE/COLONY COUNT: CPT

## 2022-03-14 PROCEDURE — 71045 X-RAY EXAM CHEST 1 VIEW: CPT | Mod: 26

## 2022-03-14 PROCEDURE — C1751: CPT

## 2022-03-14 PROCEDURE — 99285 EMERGENCY DEPT VISIT HI MDM: CPT

## 2022-03-14 PROCEDURE — 99223 1ST HOSP IP/OBS HIGH 75: CPT

## 2022-03-14 PROCEDURE — 71045 X-RAY EXAM CHEST 1 VIEW: CPT

## 2022-03-14 PROCEDURE — 81001 URINALYSIS AUTO W/SCOPE: CPT

## 2022-03-14 PROCEDURE — 73610 X-RAY EXAM OF ANKLE: CPT | Mod: 26,RT

## 2022-03-14 PROCEDURE — 36569 INSJ PICC 5 YR+ W/O IMAGING: CPT

## 2022-03-14 PROCEDURE — 87070 CULTURE OTHR SPECIMN AEROBIC: CPT

## 2022-03-14 RX ORDER — LISINOPRIL 2.5 MG/1
40 TABLET ORAL DAILY
Refills: 0 | Status: DISCONTINUED | OUTPATIENT
Start: 2022-03-14 | End: 2022-03-18

## 2022-03-14 RX ORDER — DEXTROSE 50 % IN WATER 50 %
15 SYRINGE (ML) INTRAVENOUS ONCE
Refills: 0 | Status: DISCONTINUED | OUTPATIENT
Start: 2022-03-14 | End: 2022-03-18

## 2022-03-14 RX ORDER — SODIUM CHLORIDE 9 MG/ML
2300 INJECTION INTRAMUSCULAR; INTRAVENOUS; SUBCUTANEOUS ONCE
Refills: 0 | Status: COMPLETED | OUTPATIENT
Start: 2022-03-14 | End: 2022-03-14

## 2022-03-14 RX ORDER — INSULIN LISPRO 100/ML
VIAL (ML) SUBCUTANEOUS
Refills: 0 | Status: DISCONTINUED | OUTPATIENT
Start: 2022-03-14 | End: 2022-03-18

## 2022-03-14 RX ORDER — SODIUM CHLORIDE 9 MG/ML
1000 INJECTION, SOLUTION INTRAVENOUS
Refills: 0 | Status: DISCONTINUED | OUTPATIENT
Start: 2022-03-14 | End: 2022-03-18

## 2022-03-14 RX ORDER — DEXTROSE 50 % IN WATER 50 %
25 SYRINGE (ML) INTRAVENOUS ONCE
Refills: 0 | Status: DISCONTINUED | OUTPATIENT
Start: 2022-03-14 | End: 2022-03-18

## 2022-03-14 RX ORDER — LISINOPRIL 2.5 MG/1
1 TABLET ORAL
Qty: 0 | Refills: 0 | DISCHARGE

## 2022-03-14 RX ORDER — LANOLIN ALCOHOL/MO/W.PET/CERES
3 CREAM (GRAM) TOPICAL AT BEDTIME
Refills: 0 | Status: DISCONTINUED | OUTPATIENT
Start: 2022-03-14 | End: 2022-03-18

## 2022-03-14 RX ORDER — AMLODIPINE BESYLATE 2.5 MG/1
5 TABLET ORAL DAILY
Refills: 0 | Status: DISCONTINUED | OUTPATIENT
Start: 2022-03-14 | End: 2022-03-18

## 2022-03-14 RX ORDER — ONDANSETRON 8 MG/1
4 TABLET, FILM COATED ORAL EVERY 8 HOURS
Refills: 0 | Status: DISCONTINUED | OUTPATIENT
Start: 2022-03-14 | End: 2022-03-18

## 2022-03-14 RX ORDER — VANCOMYCIN HCL 1 G
1250 VIAL (EA) INTRAVENOUS ONCE
Refills: 0 | Status: COMPLETED | OUTPATIENT
Start: 2022-03-14 | End: 2022-03-14

## 2022-03-14 RX ORDER — ACETAMINOPHEN 500 MG
650 TABLET ORAL EVERY 6 HOURS
Refills: 0 | Status: DISCONTINUED | OUTPATIENT
Start: 2022-03-14 | End: 2022-03-18

## 2022-03-14 RX ORDER — GLUCAGON INJECTION, SOLUTION 0.5 MG/.1ML
1 INJECTION, SOLUTION SUBCUTANEOUS ONCE
Refills: 0 | Status: DISCONTINUED | OUTPATIENT
Start: 2022-03-14 | End: 2022-03-18

## 2022-03-14 RX ORDER — CEFTRIAXONE 500 MG/1
1000 INJECTION, POWDER, FOR SOLUTION INTRAMUSCULAR; INTRAVENOUS EVERY 24 HOURS
Refills: 0 | Status: DISCONTINUED | OUTPATIENT
Start: 2022-03-14 | End: 2022-03-15

## 2022-03-14 RX ORDER — PIPERACILLIN AND TAZOBACTAM 4; .5 G/20ML; G/20ML
3.38 INJECTION, POWDER, LYOPHILIZED, FOR SOLUTION INTRAVENOUS ONCE
Refills: 0 | Status: COMPLETED | OUTPATIENT
Start: 2022-03-14 | End: 2022-03-14

## 2022-03-14 RX ORDER — ACETAMINOPHEN 500 MG
1000 TABLET ORAL ONCE
Refills: 0 | Status: COMPLETED | OUTPATIENT
Start: 2022-03-14 | End: 2022-03-14

## 2022-03-14 RX ORDER — INFLUENZA VIRUS VACCINE 15; 15; 15; 15 UG/.5ML; UG/.5ML; UG/.5ML; UG/.5ML
0.5 SUSPENSION INTRAMUSCULAR ONCE
Refills: 0 | Status: DISCONTINUED | OUTPATIENT
Start: 2022-03-14 | End: 2022-03-18

## 2022-03-14 RX ADMIN — Medication 166.67 MILLIGRAM(S): at 11:49

## 2022-03-14 RX ADMIN — Medication 650 MILLIGRAM(S): at 17:46

## 2022-03-14 RX ADMIN — SODIUM CHLORIDE 2300 MILLILITER(S): 9 INJECTION INTRAMUSCULAR; INTRAVENOUS; SUBCUTANEOUS at 10:44

## 2022-03-14 RX ADMIN — PIPERACILLIN AND TAZOBACTAM 200 GRAM(S): 4; .5 INJECTION, POWDER, LYOPHILIZED, FOR SOLUTION INTRAVENOUS at 10:44

## 2022-03-14 RX ADMIN — Medication 6: at 16:45

## 2022-03-14 RX ADMIN — CEFTRIAXONE 100 MILLIGRAM(S): 500 INJECTION, POWDER, FOR SOLUTION INTRAMUSCULAR; INTRAVENOUS at 16:45

## 2022-03-14 RX ADMIN — Medication 1000 MILLIGRAM(S): at 10:44

## 2022-03-14 NOTE — PATIENT PROFILE ADULT - FALL HARM RISK - UNIVERSAL INTERVENTIONS
Bed in lowest position, wheels locked, appropriate side rails in place/Call bell, personal items and telephone in reach/Instruct patient to call for assistance before getting out of bed or chair/Non-slip footwear when patient is out of bed/[place] to call system/Physically safe environment - no spills, clutter or unnecessary equipment/Purposeful Proactive Rounding/Room/bathroom lighting operational, light cord in reach

## 2022-03-14 NOTE — ED ADULT TRIAGE NOTE - CHIEF COMPLAINT QUOTE
Pt presents to er with complaints of right great toe infection, states he saw it on Friday and has not had it evaluated at this time, states he is a type 2 diabetic, pt states he had a 100.3 oral temp at home last night, states he took 650mg of Tylenol at 3a.m, denies chest pain, sob, palpitations at this time.

## 2022-03-14 NOTE — CONSULT NOTE ADULT - ATTENDING COMMENTS
Pt seen and examined at bedside. Family at bedside. Left great toe ulcer that is longstanding. MRI reviewed. Concern for septic OA vs. osteomyelitis. No acute septic shock S&S currently. Chronic infection likely. No indication for emergent surgery unless patient decompensates. ID input appreciated.   All questions answered.

## 2022-03-14 NOTE — ED PROVIDER NOTE - PHYSICAL EXAMINATION
GEN - NAD; non-toxic; A+Ox3, speaking full sentences, steady gait   HENT - NC/AT, No visible Ecchymosis, No Abrasions, No Lac/Tears, MMM, no discharge  EYES - EOMI, no conjunctival pallor, no scleral icterus  PULM - CTA B/L,  symmetric breath sounds  CV -  Tachy, S1 S2, no murmurs 2+ Pulses B/L UE  GI - (-) Leyva's, (-) Rovsings, (-) McBurneys; NT/ND, soft, no guarding, no rebound, no masses    MSK/EXT- (+) R Toe - with 0.5 cm x 0.5 cm of planter ulceration distal; with bullae, bone grossly visible - no edema, no gross deformity, warm and well perfused, no calf tenderness/swelling/erythema   SKIN - no rash or bruising  NEUROLOGIC - alert, moving all 4 ext with 5/5 Strength

## 2022-03-14 NOTE — ED PROVIDER NOTE - PROGRESS NOTE DETAILS
Resident Juanita: pt evaluated by Podiatry w/bedside I&D. Recommends IV Abx and will follow along inpatient. Case endorsed to Hospitalist (Sara Moore). Tachycardia improved s/p fluids and Tylenol. Pt with low grade fever and tachycardia with worsening erythema and pain to R 1st toe.  No other infectious symptoms.  This likely represents source. Given location and underlying comorbid conditions.  Lactate unremarkable.  HR improved with meds and fluids.  Dosed with broad spectrum abx.  Podiatry recommends admission for IV abx.  No role for surgical intervention at this time.  Further care per inpatient treatment team. Constitutional: NAD, well appearing  HEENT: no rhinorrhea  CVS:  tachycardic, regular rhythm, no m/r/g  Resp:  CTAB  GI: soft, ntnd  MSK:  no restriction to rom, full ROM to all extremities  Neuro:  A&Ox3  Skin: erythema and edema to R great toe, nvi distally, no crepitance  psych: clear thought content  Heme/lymph:  No LAD

## 2022-03-14 NOTE — CONSULT NOTE ADULT - ASSESSMENT
Assesment: 62 y/o male see in the ED for the following   -Non- Healing Full thickness Right proximal phalanx plantar ulceration, clinically infected   -Diabetes Mellitus type 2  -Pain in Right Foot   -Cellulitis of Right foot  -Difficulty with ambulation    Plan:   Chart reviewed and Patient evaluated; discussed treatment and plan with Dr. Robert Ramirez  Discussed diagnosis and treatment with patient  Bedside Incision and drainage to right hallux, pus was drained from foot, pt tolerated well. Dressed R hallux with betadine soaked gauze, dsd, kerlix.   Will continue with wet to dry saline dressings when admitted for right hallux changes.   Consulted Vascular: appreciate recommendations   X-rays reviewed  official results noted above.   Continue with IV antibiotics As Per ID/ED/Medicine  Discussed importance of daily foot examinations and proper shoe gear and to importance of lower Fasting Blood Glucose levels.   Patient demonstrated verbal understanding of all interventions and tolerated interventions well without any complications.   Podiatry will follow while in house. Assesment: 60 y/o male see in the ED for the following   -Non- Healing Full thickness Right proximal phalanx plantar ulceration, clinically infected   - OM of R proximal phalanx.   -Diabetes Mellitus type 2  -Pain in Right Foot   -Cellulitis of Right foot  -Difficulty with ambulation    Plan:   Chart reviewed and Patient evaluated; discussed treatment and plan with Dr. Robert Ramirez  Discussed diagnosis and treatment with patient  Bedside Incision and drainage to right hallux, pus was drained from foot, pt tolerated well. Dressed R hallux with betadine soaked gauze, dsd, kerlix.   Will continue with wet to dry saline dressings when admitted for right hallux changes.   Consulted Vascular: appreciate recommendations   X-rays reviewed  official results noted above.   Continue with IV antibiotics As Per ID/ED/Medicine  Discussed importance of daily foot examinations and proper shoe gear and to importance of lower Fasting Blood Glucose levels.   Patient demonstrated verbal understanding of all interventions and tolerated interventions well without any complications.   Podiatry will follow while in house.

## 2022-03-14 NOTE — CONSULT NOTE ADULT - SUBJECTIVE AND OBJECTIVE BOX
Date of Consult: 3/14/22  Chief Complaint : 62 yo m, Hx: HTN, NIDDM, Renal CA (in remission) - presents with cc: right big toe pain for the last 3 days, with subjective fevers, chills, and vomiting. Denies any objective fevers, cough, CP, SOB, abdominal pain, diarrhea, bloody stools, dysuric symptoms. Reports wound was present there since the fall 0.5 cm x 0.5 cm - but this weekend started to get swollen, red, and more painful. Has not sought care for wound, and was locally treating it, and does not follow with a podiatrist outpatient.  Patient is accompanied by wife and both are very pleasant.     REVIEW OF SYSTEMS:  CONSTITUTIONAL: No weakness, + fevers   EYES/ENT: No visual changes;  No vertigo or throat pain   NECK: No pain or stiffness  RESPIRATORY: No cough, wheezing, hemoptysis; No shortness of breath  CARDIOVASCULAR: No chest pain or palpitations  GASTROINTESTINAL: No abdominal or epigastric pain. No nausea, vomiting, or hematemesis; No diarrhea or constipation. No melena or hematochezia.  GENITOURINARY: No dysuria, frequency or hematuria  NEUROLOGICAL: No numbness or weakness  SKIN: Wound to R plantar hallux.   All other review of systems is negative unless indicated above    PMH:  Malignant Neoplasm of Kidney  Diabetes  Adrenal Adenoma  Anxiety  HTN (hypertension)    PSH:  History of Cholecystectomy  Basal Cell Carcinoma of Nose Excision  H/O partial nephrectomy    Allergies:  No Known Allergies    MEDICATIONS  (STANDING):  MEDICATIONS  (PRN):  acetaminophen     Tablet .. 650 milliGRAM(s) Oral every 6 hours PRN Mild Pain (1 - 3)      Vitals  T(F): 100.8 (03-14-22 @ 10:30), Max: 100.8 (03-14-22 @ 10:30)  HR: 141 (03-14-22 @ 09:59) (141 - 141)  BP: 125/74 (03-14-22 @ 09:59) (125/74 - 125/74)  RR: 22 (03-14-22 @ 09:59) (22 - 22)  SpO2: 99% (03-14-22 @ 09:59) (99% - 99%)  Wt(kg): --      Physical Exam:   Constitutiona: NAD, alert;  Derm:    R hallux 0.5 x 0.5 cm, plantar ulceration, probe to bone +, malodor +, no pain on palpation, + swelling, + erythema/cellulitic changes, + pus drainage, + tunneling/tracking.    Vascular: Dorsalis Pedis and Posterior Tibial pulses non-palpable at this time.  Capillary re-fill time less then 3 seconds digits 1-5 bilateral.   Cyanotic appearing R distal hallux.   Neuro: Protective sensation diminished to the level of the digits bilateral.  MSK: Muscle strength 5/5 in all major muscle groups of Right lower extremity. 5/5 in all muscle groups of LLE;  .        Labs:                          14.0   15.51 )-----------( 287      ( 14 Mar 2022 10:21 )             41.1     WBC Trend  15.51<H> Date (03-14 @ 10:21)      Chem  03-14    130<L>  |  100  |  26<H>  ----------------------------<  421<H>  4.8   |  23  |  1.30    Ca    9.4      14 Mar 2022 10:21    TPro  8.4<H>  /  Alb  3.3  /  TBili  1.0  /  DBili  x   /  AST  14<L>  /  ALT  28  /  AlkPhos  80  03-14       Rad/Ekg  < from: Xray Foot AP + Lateral + Oblique, Right (03.14.22 @ 11:29) >  IMPRESSION: Chest unremarkable. Arterial calcifications of the foot. No   acute finding.    < end of copied text >     Date of Consult: 3/14/22  Chief Complaint : 60 yo m, Hx: HTN, NIDDM, Renal CA (in remission) - presents with cc: right big toe pain for the last 3 days, with subjective fevers, chills, and vomiting. Denies any objective fevers, cough, CP, SOB, abdominal pain, diarrhea, bloody stools, dysuric symptoms. Reports wound was present there since the fall 0.5 cm x 0.5 cm - but this weekend started to get swollen, red, and more painful. Has not sought care for wound, and was locally treating it, and does not follow with a podiatrist outpatient.  Patient is accompanied by wife and both are very pleasant. Note patient is septic in Emergency room.     REVIEW OF SYSTEMS:  CONSTITUTIONAL: No weakness, + fevers   EYES/ENT: No visual changes;  No vertigo or throat pain   NECK: No pain or stiffness  RESPIRATORY: No cough, wheezing, hemoptysis; No shortness of breath  CARDIOVASCULAR: No chest pain or palpitations  GASTROINTESTINAL: No abdominal or epigastric pain. No nausea, vomiting, or hematemesis; No diarrhea or constipation. No melena or hematochezia.  GENITOURINARY: No dysuria, frequency or hematuria  NEUROLOGICAL: No numbness or weakness  SKIN: Wound to R plantar hallux.   All other review of systems is negative unless indicated above    PMH:  Malignant Neoplasm of Kidney  Diabetes  Adrenal Adenoma  Anxiety  HTN (hypertension)    PSH:  History of Cholecystectomy  Basal Cell Carcinoma of Nose Excision  H/O partial nephrectomy    Allergies:  No Known Allergies    MEDICATIONS  (STANDING):  MEDICATIONS  (PRN):  acetaminophen     Tablet .. 650 milliGRAM(s) Oral every 6 hours PRN Mild Pain (1 - 3)      Vitals  T(F): 100.8 (03-14-22 @ 10:30), Max: 100.8 (03-14-22 @ 10:30)  HR: 141 (03-14-22 @ 09:59) (141 - 141)  BP: 125/74 (03-14-22 @ 09:59) (125/74 - 125/74)  RR: 22 (03-14-22 @ 09:59) (22 - 22)  SpO2: 99% (03-14-22 @ 09:59) (99% - 99%)  Wt(kg): --      Physical Exam:   Constitutiona: NAD, alert;  Derm:    R hallux 0.5 x 0.5 cm, plantar ulceration, probe to bone +, malodor +, no pain on palpation, + swelling, + erythema/cellulitic changes, + pus drainage, + tunneling/tracking.    Vascular: Dorsalis Pedis and Posterior Tibial pulses non-palpable at this time.  Capillary re-fill time less then 3 seconds digits 1-5 bilateral.   Cyanotic appearing R distal hallux.   Neuro: Protective sensation diminished to the level of the digits bilateral.  MSK: Muscle strength 5/5 in all major muscle groups of Right lower extremity. 5/5 in all muscle groups of LLE;  .        Labs:                          14.0   15.51 )-----------( 287      ( 14 Mar 2022 10:21 )             41.1     WBC Trend  15.51<H> Date (03-14 @ 10:21)      Chem  03-14    130<L>  |  100  |  26<H>  ----------------------------<  421<H>  4.8   |  23  |  1.30    Ca    9.4      14 Mar 2022 10:21    TPro  8.4<H>  /  Alb  3.3  /  TBili  1.0  /  DBili  x   /  AST  14<L>  /  ALT  28  /  AlkPhos  80  03-14       Rad/Ekg  < from: Xray Foot AP + Lateral + Oblique, Right (03.14.22 @ 11:29) >  IMPRESSION: Chest unremarkable. Arterial calcifications of the foot. No   acute finding.    < end of copied text >

## 2022-03-14 NOTE — ED PROVIDER NOTE - NS ED ROS FT
Constitution: (+) Subjective Fever or chills, No Weight Loss,   Eyes: No visual changes  HEENT: No cough, No Discharge, No Rhinorrhea, No URI symptoms  Cardio: No Chest pain, No Palpitations, No Dyspnea  Resp: No SOB, No Wheezing  GI: No abdominal pain, (+) Nausea, (+) Vomiting, No Constipation, No Diarrhea  : No burning upon urination, trouble urinating, no foul odor from urine  MSK: (+) R Toe Pain; No Back pain, No Numbness, No Tingling, No Weakness  Neuro: No Headache,   Skin: No Bruising, (+) R Toe Swelling

## 2022-03-14 NOTE — ED PROVIDER NOTE - NSICDXPASTSURGICALHX_GEN_ALL_CORE_FT
PAST SURGICAL HISTORY:  Basal Cell Carcinoma of Nose Excision 8/2008    H/O partial nephrectomy Left    History of Cholecystectomy 1997

## 2022-03-14 NOTE — ED PROVIDER NOTE - OBJECTIVE STATEMENT
61 yom, Hx: HTN, NIDDM, Renal CA (in remission) - presents with cc: right big toe pain for the last 3 days, with subjective fevers, chills, and vomiting. Denies any objective fevers, cough, CP, SOB, abdominal pain, diarrhea, bloody stools, dysuric symptoms. Reports wound was present there since the fall 0.5 cm x 0.5 cm - but this weekend started to get swollen, red, and more painful. Has not sought care for wound. .

## 2022-03-14 NOTE — ED PROVIDER NOTE - NSICDXPASTMEDICALHX_GEN_ALL_CORE_FT
PAST MEDICAL HISTORY:  Adrenal Adenoma     Anxiety     Diabetes     HTN (hypertension)     Malignant Neoplasm of Kidney

## 2022-03-14 NOTE — ED PROVIDER NOTE - CLINICAL SUMMARY MEDICAL DECISION MAKING FREE TEXT BOX
61 yom, Hx: HTN, NIDDM, Renal CA (in remission) - presents with cc: right big toe pain for the last 3 days, with subjective fevers, chills, and vomiting. Exam (0.5 cm x 0.5 cm plantar ulceration with bullae), presentation, and history concerning for OM vs. Nec Fasc (lower suspicion; wound present since fall, but worsening over last 3 days) - plan: STAT Pod Consult, CBC, CMP, EKG, CXR, ESR, CRP, Fluids, Rectal Temp, Tylenol, ESR, CRP, X-Ray Foot/Ankle.

## 2022-03-14 NOTE — H&P ADULT - HISTORY OF PRESENT ILLNESS
· 62 yo, male h/o HTN, NIDDM, Renal CA (in remission) s/p nephrectomy,  presents with cc: right big toe pain for the last 3 days, with subjective fevers, chills, and vomiting. Patient states he developed an ulceration on his Rt halux a few months ago and he covered it with a bandaid. For the past 2 weeks his ulcer has gotten bigger and his toe has gotten more swollen,  red and painful. He finally told his wife and he came to the ED for evaluation.  In the ED podiatry performed an I&D of the halux abscess.      PAST MEDICAL HISTORY:  Adrenal Adenoma   Anxiety   Diabetes   HTN (hypertension)   Malignant Neoplasm of Kidney.     PAST SURGICAL HISTORY:  Basal Cell Carcinoma of Nose Excision 8/2008  H/O partial nephrectomy Left  History of Cholecystectomy 1997.    Family History:   father DM  mother Etoh abuse    Social History:  no smoking, no Etoh            REVIEW OF SYSTEMS:    CONSTITUTIONAL: No weakness, No fevers or chills  ENT: No ear ache, No sorethroat  NECK: No pain, No stiffness  RESPIRATORY: No cough, No wheezing, No hemoptysis; No dyspnea  CARDIOVASCULAR: No chest pain, No palpitations  GASTROINTESTINAL: No abd pain, No nausea, No vomiting, No hematemesis, No diarrhea or constipation. No melena, No hematochezia.  GENITOURINARY: No dysuria, No  hematuria  NEUROLOGICAL: No diplopia, No paresthesia, No motor dysfunction  MUSCULOSKELETAL: No arthralgia, No myalgia  SKIN: No rashes, or lesions   PSYCH: no anxiety, no suicidal ideation    All other review of systems is negative unless indicated above    Vital Signs Last 24 Hrs  T(C): 37.7 (14 Mar 2022 13:23), Max: 38.2 (14 Mar 2022 10:30)  T(F): 99.9 (14 Mar 2022 13:23), Max: 100.8 (14 Mar 2022 10:30)  HR: 110 (14 Mar 2022 13:23) (105 - 141)  BP: 121/65 (14 Mar 2022 13:23) (121/65 - 125/74)  BP(mean): --  RR: 18 (14 Mar 2022 13:23) (18 - 22)  SpO2: 100% (14 Mar 2022 13:23) (99% - 100%)    PHYSICAL EXAM:    GENERAL: NAD  HEENT:  NC/AT, EOMI, PERRLA, No scleral icterus, Moist mucous membranes  NECK: Supple, No JVD  CNS:  Alert & Oriented X3, Motor Strength 5/5 B/L upper and lower extremities; DTRs 2+ intact   LUNG: Normal Breath sounds, Clear to auscultation bilaterally, No rales, No rhonchi, No wheezing  HEART: RRR; No murmurs, No rubs  ABDOMEN: +BS, ST/ND/NT  GENITOURINARY: Voiding, Bladder not distended  EXTREMITIES:  2+ Peripheral Pulses,Rt foot well perfused, Rt halux: +edema, +erythema, +plantar ulceration   MUSCULOSKELTAL: Joints normal ROM, No TTP, No effusion  VAGINAL: deferred  SKIN: no rashes  RECTAL: deferred, not indicated  BREAST: deferred                          14.0   15.51 )-----------( 287      ( 14 Mar 2022 10:21 )             41.1     03-14    130<L>  |  100  |  26<H>  ----------------------------<  421<H>  4.8   |  23  |  1.30    Ca    9.4      14 Mar 2022 10:21    TPro  8.4<H>  /  Alb  3.3  /  TBili  1.0  /  DBili  x   /  AST  14<L>  /  ALT  28  /  AlkPhos  80  03-14    Vancomycin levels:   Cultures:     MEDICATIONS  (STANDING):  amLODIPine   Tablet 5 milliGRAM(s) Oral daily  cefTRIAXone   IVPB 1000 milliGRAM(s) IV Intermittent every 24 hours  insulin lispro (ADMELOG) corrective regimen sliding scale   SubCutaneous three times a day before meals  lisinopril 40 milliGRAM(s) Oral daily    MEDICATIONS  (PRN):  acetaminophen     Tablet .. 650 milliGRAM(s) Oral every 6 hours PRN Mild Pain (1 - 3)  aluminum hydroxide/magnesium hydroxide/simethicone Suspension 30 milliLiter(s) Oral every 4 hours PRN Dyspepsia  melatonin 3 milliGRAM(s) Oral at bedtime PRN Insomnia  ondansetron Injectable 4 milliGRAM(s) IV Push every 8 hours PRN Nausea and/or Vomiting      all labs reviewed  all imaging reviewed    A/P:    1. Rt halux cellulitis and abscess:   s/p I&D  f/u cultures  Ceftriaxone/Vanco given in the ED  c/w Ceftriaxone  ID consult for MRSA coverage  If there is concern for OM from podiatry should get MRI  Xray: no evidence of gas, no bone destruction    2. DM:  ADA  ISS    3. Htn:   c/w Lisinopril, Norvasc

## 2022-03-14 NOTE — ED ADULT NURSE NOTE - OBJECTIVE STATEMENT
Patient presents to the ED with complaints of having a diabetic ulcer to his right great toe. Patient states he has had the ulcer since last Fall 2021. Patient denies pain, diarrhea, palpitation, shortness of breath and reports vomited last night after drinking gatorade. Patient reports he is a  and has not seen a doctor for. Ulcer to underside of right great toe is 2.5cm x 1cm. Ulcer is macerated, slight drainage to anterior aspect of foot with small open ulcer to anterior aspect. Surrounding the wound is warm, reddened and swollen. Patient reports being a diabetic but states "It is well managed at home."

## 2022-03-14 NOTE — PHARMACOTHERAPY INTERVENTION NOTE - COMMENTS
Medication history complete, [facility] with [facility]

## 2022-03-15 LAB
A1C WITH ESTIMATED AVERAGE GLUCOSE RESULT: 9.8 % — HIGH (ref 4–5.6)
ANION GAP SERPL CALC-SCNC: 5 MMOL/L — SIGNIFICANT CHANGE UP (ref 5–17)
BUN SERPL-MCNC: 17 MG/DL — SIGNIFICANT CHANGE UP (ref 7–23)
CALCIUM SERPL-MCNC: 8.8 MG/DL — SIGNIFICANT CHANGE UP (ref 8.5–10.1)
CHLORIDE SERPL-SCNC: 106 MMOL/L — SIGNIFICANT CHANGE UP (ref 96–108)
CO2 SERPL-SCNC: 25 MMOL/L — SIGNIFICANT CHANGE UP (ref 22–31)
CREAT SERPL-MCNC: 0.89 MG/DL — SIGNIFICANT CHANGE UP (ref 0.5–1.3)
EGFR: 98 ML/MIN/1.73M2 — SIGNIFICANT CHANGE UP
ESTIMATED AVERAGE GLUCOSE: 235 MG/DL — HIGH (ref 68–114)
GLUCOSE SERPL-MCNC: 287 MG/DL — HIGH (ref 70–99)
HCT VFR BLD CALC: 37 % — LOW (ref 39–50)
HCV AB S/CO SERPL IA: 0.14 S/CO — SIGNIFICANT CHANGE UP (ref 0–0.99)
HCV AB SERPL-IMP: SIGNIFICANT CHANGE UP
HGB BLD-MCNC: 12.3 G/DL — LOW (ref 13–17)
MCHC RBC-ENTMCNC: 27 PG — SIGNIFICANT CHANGE UP (ref 27–34)
MCHC RBC-ENTMCNC: 33.2 GM/DL — SIGNIFICANT CHANGE UP (ref 32–36)
MCV RBC AUTO: 81.3 FL — SIGNIFICANT CHANGE UP (ref 80–100)
PLATELET # BLD AUTO: 265 K/UL — SIGNIFICANT CHANGE UP (ref 150–400)
POTASSIUM SERPL-MCNC: 4.3 MMOL/L — SIGNIFICANT CHANGE UP (ref 3.5–5.3)
POTASSIUM SERPL-SCNC: 4.3 MMOL/L — SIGNIFICANT CHANGE UP (ref 3.5–5.3)
RBC # BLD: 4.55 M/UL — SIGNIFICANT CHANGE UP (ref 4.2–5.8)
RBC # FLD: 13.2 % — SIGNIFICANT CHANGE UP (ref 10.3–14.5)
SODIUM SERPL-SCNC: 136 MMOL/L — SIGNIFICANT CHANGE UP (ref 135–145)
WBC # BLD: 9.54 K/UL — SIGNIFICANT CHANGE UP (ref 3.8–10.5)
WBC # FLD AUTO: 9.54 K/UL — SIGNIFICANT CHANGE UP (ref 3.8–10.5)

## 2022-03-15 PROCEDURE — 99232 SBSQ HOSP IP/OBS MODERATE 35: CPT

## 2022-03-15 PROCEDURE — 73720 MRI LWR EXTREMITY W/O&W/DYE: CPT | Mod: 26,RT

## 2022-03-15 PROCEDURE — 99221 1ST HOSP IP/OBS SF/LOW 40: CPT

## 2022-03-15 RX ORDER — INSULIN LISPRO 100/ML
4 VIAL (ML) SUBCUTANEOUS ONCE
Refills: 0 | Status: COMPLETED | OUTPATIENT
Start: 2022-03-15 | End: 2022-03-15

## 2022-03-15 RX ORDER — CEFEPIME 1 G/1
2000 INJECTION, POWDER, FOR SOLUTION INTRAMUSCULAR; INTRAVENOUS EVERY 12 HOURS
Refills: 0 | Status: DISCONTINUED | OUTPATIENT
Start: 2022-03-15 | End: 2022-03-18

## 2022-03-15 RX ADMIN — Medication 6: at 11:40

## 2022-03-15 RX ADMIN — Medication 6: at 17:12

## 2022-03-15 RX ADMIN — Medication 100 MILLIGRAM(S): at 21:52

## 2022-03-15 RX ADMIN — AMLODIPINE BESYLATE 5 MILLIGRAM(S): 2.5 TABLET ORAL at 09:35

## 2022-03-15 RX ADMIN — CEFEPIME 100 MILLIGRAM(S): 1 INJECTION, POWDER, FOR SOLUTION INTRAMUSCULAR; INTRAVENOUS at 21:52

## 2022-03-15 RX ADMIN — LISINOPRIL 40 MILLIGRAM(S): 2.5 TABLET ORAL at 09:36

## 2022-03-15 RX ADMIN — Medication 4 UNIT(S): at 21:58

## 2022-03-15 NOTE — PROGRESS NOTE ADULT - ASSESSMENT
Assesment: 62 y/o male see in the ED for the following   -Non- Healing Full thickness Right proximal phalanx plantar ulceration, clinically infected   - OM of R proximal phalanx.   -Diabetes Mellitus type 2  -Pain in Right Foot   -Cellulitis of Right foot  -Difficulty with ambulation    Plan:   Chart reviewed and Patient evaluated; discussed treatment and plan with Dr. Robert Ramirez  Discussed diagnosis and treatment with patient  ED 3/14/22 Bedside Incision and drainage to right hallux, pus was drained from foot, pt tolerated well. Dressed R hallux with betadine soaked gauze, dsd, kerlix.   Will continue with wet to dry saline dressings when admitted for right hallux changes.   Consulted Vascular: appreciate recommendations   X-rays reviewed  official results noted above.   Continue with IV antibiotics As Per ID/ED/Medicine  Discussed importance of daily foot examinations and proper shoe gear and to importance of lower Fasting Blood Glucose levels.   Patient demonstrated verbal understanding of all interventions and tolerated interventions well without any complications.   Podiatry will follow while in house.

## 2022-03-15 NOTE — CONSULT NOTE ADULT - ASSESSMENT
60 y/o male with h/o HTN, DM2, Renal CA (in remission) s/p L nephrectomy,  presents with to ED c/o right first toe pain x 4 days, with subjective fever and 1 episode vomiting. Patient states he developed an ulceration on his Rt first toe during the fall 2021, unable to recall what caused it and didnt realize it was there. Performed local care with neosporin but didnt seek medical attention for it. For the past 1-2 weeks his toe has gotten more swollen, red and uncomfortable. Reports hx of left 5th metatarsal infection d/t skin abrasion years ago, tx with abx and no sequalae. Pt works in construction. Denies chest pain, SOB, abd pain, diarrhea, numbess/tingling LE b/l.  In the ED, pt received IV vancomycin 1250mg x 1 and IV zosyn 3.375mg x 1. Podiatry performed an I&D of a collection at the site.    Febrile syndrome. R first metatarsal non-healing plantar ulceration. Sepsis due to suspected acute osteomyelitis with abscess.   - s/p vancomycin and zosyn in ED  - leukocytosis, elevated ESR and CRP  - obtain MRI  - continue with IV abx coverage  - on ceftriaxone 1g q24hr  - cont vancomycin   - f/u blood cultures  - podiatry consult appreciated  - vascular consult    Will discuss with Dr. Granger   60 y/o male with h/o HTN, DM2, Renal CA (in remission) s/p L nephrectomy was admitted on 3/14 for right first toe pain, erythema x 4 days, with subjective fever and 1 episode vomiting. Patient states he developed an ulceration on his Rt first toe during the fall 2021, unable to recall what caused it and didn't realize it was there. Performed local care with neosporin but didn't seek medical attention for it. For the past 1-2 weeks his toe has gotten more swollen, red and uncomfortable. Reports hx of left 5th metatarsal infection d/t skin abrasion years ago, tx with abx and no sequalae.  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 In the ED, pt received IV vancomycin 1250mg x 1 and IV zosyn 3.375mg x 1, then ceftriaxone.     1. Febrile syndrome. Possible sepsis. R first metatarsal non-healing plantar ulceration. Right foot cellulitis with probable underlying acute osteomyelitis s/p I and D.   - s/p vancomycin and zosyn in ED  - leukocytosis, elevated ESR and CRP  - obtain MRI  - on ceftriaxone 1g q24hr so far  - start vancomycin 1000 mf IV q12h and cefepime 2 gm IV q12h  -reason for abx use and side effects reviewed with patient; monitor BMP and vancomycin trough levels   -local wound care per podiatry   - continue with IV abx coverage  - f/u blood cultures  - podiatry consult appreciated  - vascular consult       62 y/o male with h/o HTN, DM2, Renal CA (in remission) s/p L nephrectomy was admitted on 3/14 for right first toe pain, erythema x 4 days, with subjective fever and 1 episode vomiting. Patient states he developed an ulceration on his Rt first toe during the fall 2021, unable to recall what caused it and didn't realize it was there. Performed local care with neosporin but didn't seek medical attention for it. For the past 1-2 weeks his toe has gotten more swollen, red and uncomfortable. Reports hx of left 5th metatarsal infection d/t skin abrasion years ago, tx with abx and no sequalae.  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 In the ED, pt received IV vancomycin 1250mg x 1 and IV zosyn 3.375mg x 1, then ceftriaxone.     1. Febrile syndrome. Possible sepsis. R first metatarsal non-healing plantar ulceration. Right foot cellulitis with probable underlying acute osteomyelitis s/p I and D. Renal Ca s/p nephrectomy  -renal function is frail - ARF improving  - s/p vancomycin and zosyn in ED  - leukocytosis, elevated ESR and CRP  -obtain wound c/s  - obtain MRI  - on ceftriaxone 1g q24hr so far  - start doxycycline 100 mg PO q12h and cefepime 2 gm IV q12h  -reason for abx use and side effects reviewed with patient; monitor BMP and vancomycin trough levels   -local wound care per podiatry   - continue with IV abx coverage  - f/u blood cultures  - podiatry consult appreciated  - vascular consult

## 2022-03-15 NOTE — CONSULT NOTE ADULT - SUBJECTIVE AND OBJECTIVE BOX
Patient is a 61y old  Male who presents with a chief complaint of Right toe discomfort, fever    HPI:  62 y/o male with h/o HTN, DM2, Renal CA (in remission) s/p L nephrectomy,  presents with to ED c/o right first toe pain x 4 days, with subjective fever and 1 episode vomiting. Patient states he developed an ulceration on his Rt first toe during the fall , unable to recall what caused it and didnt realize it was there. Performed local care with neosporin but didnt seek medical attention for it. For the past 1-2 weeks his toe has gotten more swollen, red and uncomfortable. Reports hx of left 5th metatarsal infection d/t skin abrasion years ago, tx with abx and no sequalae.  Pt works in construction. Denies chest pain, SOB, abd pain, diarrhea, numbess/tingling LE b/l.  In the ED, pt received IV vancomycin 1250mg x 1 and IV zosyn 3.375mg x 1. Podiatry performed an I&D of a collection at the site.    PAST MEDICAL HISTORY:  Adrenal Adenoma   Anxiety   Diabetes   HTN (hypertension)   Malignant Neoplasm of Kidney s/p partial nephrectomy    PAST SURGICAL HISTORY:  Basal Cell Carcinoma of Nose Excision 2008  H/O partial nephrectomy Left  History of Cholecystectomy .    PMH: as above  PSH: as above    Meds: per reconciliation sheet, noted below  MEDICATIONS  (STANDING):  amLODIPine   Tablet 5 milliGRAM(s) Oral daily  cefTRIAXone   IVPB 1000 milliGRAM(s) IV Intermittent every 24 hours  dextrose 40% Gel 15 Gram(s) Oral once  dextrose 5%. 1000 milliLiter(s) (50 mL/Hr) IV Continuous <Continuous>  dextrose 50% Injectable 25 Gram(s) IV Push once  glucagon  Injectable 1 milliGRAM(s) IntraMuscular once  influenza   Vaccine 0.5 milliLiter(s) IntraMuscular once  insulin lispro (ADMELOG) corrective regimen sliding scale   SubCutaneous three times a day before meals  lisinopril 40 milliGRAM(s) Oral daily    MEDICATIONS  (PRN):  acetaminophen     Tablet .. 650 milliGRAM(s) Oral every 6 hours PRN Mild Pain (1 - 3)  aluminum hydroxide/magnesium hydroxide/simethicone Suspension 30 milliLiter(s) Oral every 4 hours PRN Dyspepsia  melatonin 3 milliGRAM(s) Oral at bedtime PRN Insomnia  ondansetron Injectable 4 milliGRAM(s) IV Push every 8 hours PRN Nausea and/or Vomiting    Allergies    No Known Allergies  Intolerances    Social: former smoking, no alcohol, no illegal drugs; no recent travel, no exposure to TB    FAMILY HISTORY:  Mother- living, hx DM2  Father- , hx DM2, HTN    ROS: has fever. has vomiting. denies HA, no dizziness, no sore throat, no blurry vision, no CP, no palpitations, no SOB, no cough, no abdominal pain, no diarrhea, no Nausea no dysuria, no leg pain, no claudication, has R first metatarsal ulcer, no joint aches, no rectal pain or bleeding, no night sweats  All other systems reviewed and are negative    Vital Signs Last 24 Hrs  T(C): 37.1 (15 Mar 2022 07:44), Max: 38.2 (14 Mar 2022 10:30)  T(F): 98.8 (15 Mar 2022 07:44), Max: 100.8 (14 Mar 2022 10:30)  HR: 109 (15 Mar 2022 07:44) (102 - 110)  BP: 134/88 (15 Mar 2022 07:44) (121/65 - 153/70)  BP(mean): --  RR: 18 (15 Mar 2022 07:44) (18 - 18)  SpO2: 100% (15 Mar 2022 07:44) (96% - 100%)  Daily     Daily     PE:    Constitutional: laying in bed, alert, pleasant, NAD  HEENT: NC/AT, EOMI, PERRLA, conjunctivae clear;   Neck: supple; thyroid not palpable  Respiratory: respiratory effort normal; clear to auscultation  Cardiovascular: S1S2 regular, no murmurs  Abdomen: soft, not tender, not distended, positive BS;   Genitourinary: no suprapubic tenderness  Lymphatic: no LN palpable  Musculoskeletal: no crepitus, no muscle tenderness, no joint tenderness  Extremities: 1+ pitting edema of R foot, unable to assess pulses, cap refill <2sec b/l. R first metatarsal cool to touch.   Neurological/ Psychiatric: AxOx3, Judgement and insight normal;  moving all extremities  Skin: R first metatarsal plantar ulceration ~2 cm x 1cm with surrounding redness, small ulceration on medial side of toe, ecchymosis at MTP joint.    Labs: all available labs reviewed                        14.0   15.51 )-----------( 287      ( 14 Mar 2022 10:21 )             41.1     03-14    130<L>  |  100  |  26<H>  ----------------------------<  421<H>  4.8   |  23  |  1.30    Ca    9.4      14 Mar 2022 10:21    TPro  8.4<H>  /  Alb  3.3  /  TBili  1.0  /  DBili  x   /  AST  14<L>  /  ALT  28  /  AlkPhos  80  03-14     LIVER FUNCTIONS - ( 14 Mar 2022 10:21 )  Alb: 3.3 g/dL / Pro: 8.4 gm/dL / ALK PHOS: 80 U/L / ALT: 28 U/L / AST: 14 U/L / GGT: x           Sedimentation Rate, Erythrocyte (22 @ 10:21)    Sedimentation Rate, Erythrocyte: 41 mm/hr    C-Reactive Protein, Serum (22 @ 10:21)    C-Reactive Protein, Serum: 189 mg/L    Urinalysis Basic - ( 14 Mar 2022 20:45 )    Color: Yellow / Appearance: Clear / S.015 / pH: x  Gluc: x / Ketone: Trace  / Bili: Negative / Urobili: 4   Blood: x / Protein: 30 mg/dL / Nitrite: Negative   Leuk Esterase: Negative / RBC: 3-5 /HPF / WBC 3-5   Sq Epi: x / Non Sq Epi: Few / Bacteria: Few    Radiology: all available radiological tests reviewed  < from: Xray Foot AP + Lateral + Oblique, Right (22 @ 11:29) >  Right foot. 3 views.    Mild to moderate degeneration of first MTP joint.    No bone destruction, fracture, or tracking air.    < end of copied text >    Advanced directives addressed: full resuscitation Patient is a 61y old  Male who presents with a chief complaint of Right toe discomfort, fever    HPI:  60 y/o male with h/o HTN, DM2, Renal CA (in remission) s/p L nephrectomy was admitted on 3/14 for right first toe pain, erythema x 4 days, with subjective fever and 1 episode vomiting. Patient states he developed an ulceration on his Rt first toe during the fall , unable to recall what caused it and didn't realize it was there. Performed local care with neosporin but didn't seek medical attention for it. For the past 1-2 weeks his toe has gotten more swollen, red and uncomfortable. Reports hx of left 5th metatarsal infection d/t skin abrasion years ago, tx with abx and no sequalae.  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 In the ED, pt received IV vancomycin 1250mg x 1 and IV zosyn 3.375mg x 1, then ceftriaxone.   Podiatry performed an I&D of a collection at the site.    PAST MEDICAL HISTORY:  Adrenal Adenoma   Anxiety   Diabetes   HTN (hypertension)   Malignant Neoplasm of Kidney s/p partial nephrectomy    PAST SURGICAL HISTORY:  Basal Cell Carcinoma of Nose Excision 2008  H/O partial nephrectomy Left  History of Cholecystectomy .    Meds: per reconciliation sheet, noted below  MEDICATIONS  (STANDING):  amLODIPine   Tablet 5 milliGRAM(s) Oral daily  cefTRIAXone   IVPB 1000 milliGRAM(s) IV Intermittent every 24 hours  dextrose 40% Gel 15 Gram(s) Oral once  dextrose 5%. 1000 milliLiter(s) (50 mL/Hr) IV Continuous <Continuous>  dextrose 50% Injectable 25 Gram(s) IV Push once  glucagon  Injectable 1 milliGRAM(s) IntraMuscular once  influenza   Vaccine 0.5 milliLiter(s) IntraMuscular once  insulin lispro (ADMELOG) corrective regimen sliding scale   SubCutaneous three times a day before meals  lisinopril 40 milliGRAM(s) Oral daily    MEDICATIONS  (PRN):  acetaminophen     Tablet .. 650 milliGRAM(s) Oral every 6 hours PRN Mild Pain (1 - 3)  aluminum hydroxide/magnesium hydroxide/simethicone Suspension 30 milliLiter(s) Oral every 4 hours PRN Dyspepsia  melatonin 3 milliGRAM(s) Oral at bedtime PRN Insomnia  ondansetron Injectable 4 milliGRAM(s) IV Push every 8 hours PRN Nausea and/or Vomiting    Allergies    No Known Allergies  Intolerances    Social: former smoking, no alcohol, no illegal drugs; no recent travel, no exposure to TB    FAMILY HISTORY:  Mother- living, hx DM2  Father- , hx DM2, HTN    ROS: has fever. has vomiting. denies HA, no dizziness, no sore throat, no blurry vision, no CP, no palpitations, no SOB, no cough, no abdominal pain, no diarrhea, no Nausea no dysuria, no leg pain, no claudication, has R first metatarsal ulcer, no joint aches, no rectal pain or bleeding, no night sweats  All other systems reviewed and are negative    Vital Signs Last 24 Hrs  T(C): 37.1 (15 Mar 2022 07:44), Max: 38.2 (14 Mar 2022 10:30)  T(F): 98.8 (15 Mar 2022 07:44), Max: 100.8 (14 Mar 2022 10:30)  HR: 109 (15 Mar 2022 07:44) (102 - 110)  BP: 134/88 (15 Mar 2022 07:44) (121/65 - 153/70)  BP(mean): --  RR: 18 (15 Mar 2022 07:44) (18 - 18)  SpO2: 100% (15 Mar 2022 07:44) (96% - 100%)  Daily     Daily     PE:    Constitutional: laying in bed, alert, pleasant, NAD  HEENT: NC/AT, EOMI, PERRLA, conjunctivae clear;   Neck: supple; thyroid not palpable  Respiratory: respiratory effort normal; clear to auscultation  Cardiovascular: S1S2 regular, no murmurs  Abdomen: soft, not tender, not distended, positive BS;   Genitourinary: no suprapubic tenderness  Lymphatic: no LN palpable  Musculoskeletal: no crepitus, no muscle tenderness, no joint tenderness  Extremities: 1+ pitting edema of R foot, unable to assess pulses, cap refill <2sec b/l. R first metatarsal cool to touch.   Neurological/ Psychiatric: AxOx3, Judgement and insight normal;  moving all extremities  Skin: R first metatarsal plantar ulceration ~2 cm x 1cm with surrounding redness, small ulceration on medial side of toe, ecchymosis at MTP joint.    Labs: all available labs reviewed                        0   1551 )-----------( 287      ( 14 Mar 2022 10:21 )             41.1     03-14    130<L>  |  100  |  26<H>  ----------------------------<  421<H>  4.8   |  23  |  1.30    Ca    9.4      14 Mar 2022 10:21    TPro  8.4<H>  /  Alb  3.3  /  TBili  1.0  /  DBili  x   /  AST  14<L>  /  ALT  28  /  AlkPhos  80  03-14     LIVER FUNCTIONS - ( 14 Mar 2022 10:21 )  Alb: 3.3 g/dL / Pro: 8.4 gm/dL / ALK PHOS: 80 U/L / ALT: 28 U/L / AST: 14 U/L / GGT: x           Sedimentation Rate, Erythrocyte (22 @ 10:21)    Sedimentation Rate, Erythrocyte: 41 mm/hr    C-Reactive Protein, Serum (22 @ 10:21)    C-Reactive Protein, Serum: 189 mg/L    Urinalysis Basic - ( 14 Mar 2022 20:45 )    Color: Yellow / Appearance: Clear / S.015 / pH: x  Gluc: x / Ketone: Trace  / Bili: Negative / Urobili: 4   Blood: x / Protein: 30 mg/dL / Nitrite: Negative   Leuk Esterase: Negative / RBC: 3-5 /HPF / WBC 3-5   Sq Epi: x / Non Sq Epi: Few / Bacteria: Few    Radiology: all available radiological tests reviewed  < from: Xray Foot AP + Lateral + Oblique, Right (22 @ 11:29) >  Right foot. 3 views.    Mild to moderate degeneration of first MTP joint.    No bone destruction, fracture, or tracking air.    < end of copied text >    Advanced directives addressed: full resuscitation Patient is a 61y old  Male who presents with a chief complaint of Right toe discomfort, fever    HPI:  60 y/o male with h/o HTN, DM2, Renal CA (in remission) s/p L nephrectomy was admitted on 3/14 for right first toe pain, erythema x 4 days, with subjective fever and 1 episode vomiting. Patient states he developed an ulceration on his Rt first toe during the fall , unable to recall what caused it and didn't realize it was there. Performed local care with neosporin but didn't seek medical attention for it. For the past 1-2 weeks his toe has gotten more swollen, red and uncomfortable. Reports hx of left 5th metatarsal infection d/t skin abrasion years ago, tx with abx and no sequalae.  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 In the ED, pt received IV vancomycin 1250mg x 1 and IV zosyn 3.375mg x 1, then ceftriaxone.   Podiatry performed an I&D of a collection at the site.    PAST MEDICAL HISTORY:  Adrenal Adenoma   Anxiety   Diabetes   HTN (hypertension)   Malignant Neoplasm of Kidney s/p partial nephrectomy    PAST SURGICAL HISTORY:  Basal Cell Carcinoma of Nose Excision 2008  H/O partial nephrectomy Left  History of Cholecystectomy .    Meds: per reconciliation sheet, noted below  MEDICATIONS  (STANDING):  amLODIPine   Tablet 5 milliGRAM(s) Oral daily  cefTRIAXone   IVPB 1000 milliGRAM(s) IV Intermittent every 24 hours  dextrose 40% Gel 15 Gram(s) Oral once  dextrose 5%. 1000 milliLiter(s) (50 mL/Hr) IV Continuous <Continuous>  dextrose 50% Injectable 25 Gram(s) IV Push once  glucagon  Injectable 1 milliGRAM(s) IntraMuscular once  influenza   Vaccine 0.5 milliLiter(s) IntraMuscular once  insulin lispro (ADMELOG) corrective regimen sliding scale   SubCutaneous three times a day before meals  lisinopril 40 milliGRAM(s) Oral daily    MEDICATIONS  (PRN):  acetaminophen     Tablet .. 650 milliGRAM(s) Oral every 6 hours PRN Mild Pain (1 - 3)  aluminum hydroxide/magnesium hydroxide/simethicone Suspension 30 milliLiter(s) Oral every 4 hours PRN Dyspepsia  melatonin 3 milliGRAM(s) Oral at bedtime PRN Insomnia  ondansetron Injectable 4 milliGRAM(s) IV Push every 8 hours PRN Nausea and/or Vomiting    Allergies    No Known Allergies  Intolerances    Social: former smoking, no alcohol, no illegal drugs; no recent travel, no exposure to TB    FAMILY HISTORY:  Mother- living, hx DM2  Father- , hx DM2, HTN    ROS: has fever. has vomiting. denies HA, no dizziness, no sore throat, no blurry vision, no CP, no palpitations, no SOB, no cough, no abdominal pain, no diarrhea, no Nausea no dysuria, no leg pain, no claudication, has R first metatarsal ulcer, no joint aches, no rectal pain or bleeding, no night sweats  All other systems reviewed and are negative    Vital Signs Last 24 Hrs  T(C): 37.1 (15 Mar 2022 07:44), Max: 38.2 (14 Mar 2022 10:30)  T(F): 98.8 (15 Mar 2022 07:44), Max: 100.8 (14 Mar 2022 10:30)  HR: 109 (15 Mar 2022 07:44) (102 - 110)  BP: 134/88 (15 Mar 2022 07:44) (121/65 - 153/70)  BP(mean): --  RR: 18 (15 Mar 2022 07:44) (18 - 18)  SpO2: 100% (15 Mar 2022 07:44) (96% - 100%)  Daily     Daily     PE:    Constitutional: laying in bed, alert, pleasant, NAD  HEENT: NC/AT, EOMI, PERRLA, conjunctivae clear;   Neck: supple; thyroid not palpable  Respiratory: respiratory effort normal; clear to auscultation  Cardiovascular: S1S2 regular, no murmurs  Abdomen: soft, not tender, not distended, positive BS;   Genitourinary: no suprapubic tenderness  Lymphatic: no LN palpable  Musculoskeletal: no crepitus, no muscle tenderness, no joint tenderness  Extremities: 1+ pitting edema of R foot, unable to assess pulses, cap refill <2sec b/l. R first metatarsal cool to touch.   Neurological/ Psychiatric: AxOx3, Judgement and insight normal;  moving all extremities  Skin: R first great toe / metatarsal plantar ulceration ~2 cm x 1cm with surrounding erythema, edema, local tenderness with scant discharge  Right great toe ulceration on medial side of toe, ecchymosis at MTP joint; erythema extending to forefoot    Labs: all available labs reviewed                        14.0   15.51 )-----------( 287      ( 14 Mar 2022 10:21 )             41.1     03-14    130<L>  |  100  |  26<H>  ----------------------------<  421<H>  4.8   |  23  |  1.30    Ca    9.4      14 Mar 2022 10:21    TPro  8.4<H>  /  Alb  3.3  /  TBili  1.0  /  DBili  x   /  AST  14<L>  /  ALT  28  /  AlkPhos  80  03-14     LIVER FUNCTIONS - ( 14 Mar 2022 10:21 )  Alb: 3.3 g/dL / Pro: 8.4 gm/dL / ALK PHOS: 80 U/L / ALT: 28 U/L / AST: 14 U/L / GGT: x           Sedimentation Rate, Erythrocyte (22 @ 10:21)    Sedimentation Rate, Erythrocyte: 41 mm/hr    C-Reactive Protein, Serum (22 @ 10:21)    C-Reactive Protein, Serum: 189 mg/L    Urinalysis Basic - ( 14 Mar 2022 20:45 )    Color: Yellow / Appearance: Clear / S.015 / pH: x  Gluc: x / Ketone: Trace  / Bili: Negative / Urobili: 4   Blood: x / Protein: 30 mg/dL / Nitrite: Negative   Leuk Esterase: Negative / RBC: 3-5 /HPF / WBC 3-5   Sq Epi: x / Non Sq Epi: Few / Bacteria: Few    Radiology: all available radiological tests reviewed  < from: Xray Foot AP + Lateral + Oblique, Right (22 @ 11:29) >  Right foot. 3 views.    Mild to moderate degeneration of first MTP joint.    No bone destruction, fracture, or tracking air.    < end of copied text >    Advanced directives addressed: full resuscitation

## 2022-03-15 NOTE — PROGRESS NOTE ADULT - SUBJECTIVE AND OBJECTIVE BOX
60 yo, male h/o HTN, NIDDM, Renal CA (in remission) s/p nephrectomy,  presents with cc: right big toe pain for the last 3 days, with subjective fevers, chills, and vomiting. Patient states he developed an ulceration on his Rt halux a few months ago and he covered it with a bandaid. For the past 2 weeks his ulcer has gotten bigger and his toe has gotten more swollen,  red and painful. He finally told his wife and he came to the ED for evaluation.  In the ED podiatry performed an I&D of the halux abscess.    3/15: sitting up in bed. awaiting MRI right foot. no pain or discomfort at present.       REVIEW OF SYSTEMS:    CONSTITUTIONAL: No weakness, fevers or chills  EYES/ENT: No visual changes;  No vertigo or throat pain   NECK: No pain or stiffness  RESPIRATORY: No cough, wheezing, hemoptysis; No shortness of breath  CARDIOVASCULAR: No chest pain or palpitations  GASTROINTESTINAL: No abdominal or epigastric pain. No nausea, vomiting, or hematemesis; No diarrhea or constipation. No melena or hematochezia.  GENITOURINARY: No dysuria, frequency or hematuria  NEUROLOGICAL: No numbness or weakness  SKIN: No itching, burning, rashes, or lesions   All other review of systems is negative unless indicated above        Vital Signs Last 24 Hrs  T(C): 37.1 (15 Mar 2022 07:44), Max: 38.2 (14 Mar 2022 17:45)  T(F): 98.8 (15 Mar 2022 07:44), Max: 100.7 (14 Mar 2022 17:45)  HR: 109 (15 Mar 2022 07:44) (102 - 110)  BP: 134/88 (15 Mar 2022 07:44) (121/65 - 153/70)  BP(mean): --  RR: 18 (15 Mar 2022 07:44) (18 - 18)  SpO2: 100% (15 Mar 2022 07:44) (96% - 100%)        POCT Blood Glucose.: 280 mg/dL (15 Mar 2022 11:40)  POCT Blood Glucose.: 121 mg/dL (15 Mar 2022 06:25)  POCT Blood Glucose.: 129 mg/dL (14 Mar 2022 21:44)  POCT Blood Glucose.: 275 mg/dL (14 Mar 2022 16:40)      PHYSICAL EXAM:    Constitutional: NAD, awake and alert, well-developed  HEENT: PERR, EOMI, Normal Hearing, MMM  Neck: Soft and supple, No LAD, No JVD  Respiratory: Breath sounds are clear bilaterally, No wheezing, rales or rhonchi  Cardiovascular: S1 and S2, regular rate and rhythm, no Murmurs, gallops or rubs  Gastrointestinal: Bowel Sounds present, soft, nontender, nondistended, no guarding, no rebound  Extremities: No peripheral edema  Vascular: 2+ peripheral pulses  Neurological: A/O x 3, no focal deficits  Musculoskeletal: 5/5 strength b/l upper and lower extremities  Skin: No rashes. large ulceration to hallux s/p bedside I&D     MEDICATIONS:  MEDICATIONS  (STANDING):  amLODIPine   Tablet 5 milliGRAM(s) Oral daily  cefTRIAXone   IVPB 1000 milliGRAM(s) IV Intermittent every 24 hours  dextrose 40% Gel 15 Gram(s) Oral once  dextrose 5%. 1000 milliLiter(s) (50 mL/Hr) IV Continuous <Continuous>  dextrose 50% Injectable 25 Gram(s) IV Push once  glucagon  Injectable 1 milliGRAM(s) IntraMuscular once  influenza   Vaccine 0.5 milliLiter(s) IntraMuscular once  insulin lispro (ADMELOG) corrective regimen sliding scale   SubCutaneous three times a day before meals  lisinopril 40 milliGRAM(s) Oral daily      LABS: All Labs Reviewed:                        12.3   9.54  )-----------( 265      ( 15 Mar 2022 11:36 )             37.0     03-15    136  |  106  |  17  ----------------------------<  287<H>  4.3   |  25  |  0.89    Ca    8.8      15 Mar 2022 11:36    TPro  8.4<H>  /  Alb  3.3  /  TBili  1.0  /  DBili  x   /  AST  14<L>  /  ALT  28  /  AlkPhos  80  03-14    PT/INR - ( 14 Mar 2022 10:21 )   PT: 14.6 sec;   INR: 1.26 ratio  PTT - ( 14 Mar 2022 10:21 )  PTT:29.7 sec      RADIOLOGY/EKG:  < from: Xray Foot AP + Lateral + Oblique, Right (03.14.22 @ 11:29) >    IMPRESSION: Chest unremarkable. Arterial calcifications of the foot. No   acute finding.    --- End of Report ---    ALEK RICKS MD; Attending Radiologist  This document has beenelectronically signed. Mar 14 2022 11:32AM    < end of copied text >

## 2022-03-15 NOTE — CONSULT NOTE ADULT - SUBJECTIVE AND OBJECTIVE BOX
History of Present Illness:  History of Present Illness:   · 60 yo, male h/o HTN, NIDDM, Renal CA (in remission) s/p nephrectomy,  presents with cc: right big toe pain for the last 3 days, with subjective fevers, chills, and vomiting. Patient states he developed an ulceration on his Rt halux a few months ago and he covered it with a bandaid. For the past 2 weeks his ulcer has gotten bigger and his toe has gotten more swollen,  red and painful. He finally told his wife and he came to the ED for evaluation.  In the ED podiatry performed an I&D of the halux abscess.    History as above.  Pt with blister developing ~ weeks ago deteriorating into ulcer.  He is very active and denies calf claudication symptoms.  He denies significant neuropathy.  He has never had a prior toe ulcer or wound.  His WBC on admission was ~ 15K.  He has been febrile during this admission to 100.7 F.       PAST MEDICAL HISTORY:  Adrenal Adenoma   Anxiety   Diabetes   HTN (hypertension)   Malignant Neoplasm of Kidney.     PAST SURGICAL HISTORY:  Basal Cell Carcinoma of Nose Excision 8/2008  H/O partial nephrectomy Left  History of Cholecystectomy 1997.    Family History:   father DM  mother Etoh abuse    Social History:  no smoking, no Etoh            REVIEW OF SYSTEMS:    CONSTITUTIONAL: No weakness, No fevers or chills  ENT: No ear ache, No sorethroat  NECK: No pain, No stiffness  RESPIRATORY: No cough, No wheezing, No hemoptysis; No dyspnea  CARDIOVASCULAR: No chest pain, No palpitations  GASTROINTESTINAL: No abd pain, No nausea, No vomiting, No hematemesis, No diarrhea or constipation. No melena, No hematochezia.  GENITOURINARY: No dysuria, No  hematuria  NEUROLOGICAL: No diplopia, No paresthesia, No motor dysfunction  MUSCULOSKELETAL: No arthralgia, No myalgia  SKIN: No rashes, or lesions   PSYCH: no anxiety, no suicidal ideation    All other review of systems is negative unless indicated above

## 2022-03-15 NOTE — CONSULT NOTE ADULT - ASSESSMENT
Vital Signs Last 24 Hrs  T(C): 37.7 (14 Mar 2022 13:23), Max: 38.2 (14 Mar 2022 10:30)  T(F): 99.9 (14 Mar 2022 13:23), Max: 100.8 (14 Mar 2022 10:30)  HR: 110 (14 Mar 2022 13:23) (105 - 141)  BP: 121/65 (14 Mar 2022 13:23) (121/65 - 125/74)  BP(mean): --  RR: 18 (14 Mar 2022 13:23) (18 - 22)  SpO2: 100% (14 Mar 2022 13:23) (99% - 100%)    PHYSICAL EXAM:    GENERAL: NAD  HEENT:  NC/AT, EOMI, PERRLA, No scleral icterus, Moist mucous membranes  NECK: Supple, No JVD  CNS:  Alert & Oriented X3, Motor Strength 5/5 B/L upper and lower extremities; DTRs 2+ intact   LUNG: Normal Breath sounds, Clear to auscultation bilaterally, No rales, No rhonchi, No wheezing  HEART: RRR; No murmurs, No rubs  ABDOMEN: +BS, ST/ND/NT  GENITOURINARY: Voiding, Bladder not distended  EXTREMITIES:  2+ R and L DP pulses and feet well perfused appearing but for R 1st toe which is slightly cyanotic; cellulitis of R 1st toe extending to ~ MTP joint, no lymphangitic streaking; ulcer of plantar surface with necrotic tissue visible at base; no expressable purulence  MUSCULOSKELTAL: Joints normal ROM, No TTP, No effusion  VAGINAL: deferred  SKIN: no rashes  RECTAL: deferred, not indicated  BREAST: deferred                          14.0   15.51 )-----------( 287      ( 14 Mar 2022 10:21 )             41.1     03-14    130<L>  |  100  |  26<H>  ----------------------------<  421<H>  4.8   |  23  |  1.30    Ca    9.4      14 Mar 2022 10:21    TPro  8.4<H>  /  Alb  3.3  /  TBili  1.0  /  DBili  x   /  AST  14<L>  /  ALT  28  /  AlkPhos  80  03-14    Vancomycin levels:   Cultures:     MEDICATIONS  (STANDING):  amLODIPine   Tablet 5 milliGRAM(s) Oral daily  cefTRIAXone   IVPB 1000 milliGRAM(s) IV Intermittent every 24 hours  insulin lispro (ADMELOG) corrective regimen sliding scale   SubCutaneous three times a day before meals  lisinopril 40 milliGRAM(s) Oral daily    MEDICATIONS  (PRN):  acetaminophen     Tablet .. 650 milliGRAM(s) Oral every 6 hours PRN Mild Pain (1 - 3)  aluminum hydroxide/magnesium hydroxide/simethicone Suspension 30 milliLiter(s) Oral every 4 hours PRN Dyspepsia  melatonin 3 milliGRAM(s) Oral at bedtime PRN Insomnia  ondansetron Injectable 4 milliGRAM(s) IV Push every 8 hours PRN Nausea and/or Vomiting      all labs reviewed  all imaging reviewed    A/P:    1. Rt halux cellulitis and abscess:   s/p I&D  f/u cultures  Ceftriaxone/Vanco given in the ED  c/w Ceftriaxone  ID consult for MRSA coverage  If there is concern for OM from podiatry should get MRI  Xray: no evidence of gas, no bone destruction    R 1st toe infection but palpable R DP pulse suggesting perfusion adequate to heal whatever wound created either by debridement or toe amputation.  I believe MRI would be useful to determine if underlying osteomyelitis present; and, if it is, 1st toe amputation would be a consideration for management.  Will follow prn.     2. DM:  ADA  ISS    3. Htn:   c/w Lisinopril, Norvasc         Allergies and Intolerances:        Allergies:  	No Known Allergies:     Home Medications:   * Patient Currently Takes Medications as of 14-Mar-2022 12:10 documented in Structured Notes  · 	metFORMIN 500 mg oral tablet, extended release: Last Dose Taken: 14-Mar-2022 AM, 2 tab(s) orally 2 times a day  · 	glimepiride 4 mg oral tablet: Last Dose Taken: 14-Mar-2022 AM, 1 tab(s) orally 2 times a day  · 	Alogliptin 25 mg oral tablet: Last Dose Taken: 14-Mar-2022 AM, 1 tab(s) orally once a day  · 	lisinopril 40 mg oral tablet: Last Dose Taken: 14-Mar-2022 AM, 1 tab(s) orally once a day  · 	amLODIPine 5 mg oral tablet: Last Dose Taken: 14-Mar-2022 AM, 1 tab(s) orally once a day    Patient History:    Tobacco Screening:  · Core Measure Site	Yes  · Has the patient used tobacco in the past 30 days?	No    Risk Assessment:    Present on Admission:  Deep Venous Thrombosis	no  Pulmonary Embolus	no     Heart Failure:  Does this patient have a history of or has been diagnosed with heart failure? no.    HIV Screen (per [facility] Department of Health, HIV screening must be offered to every individual between ages 13 and 64)	Unable to offer due to clinical condition

## 2022-03-15 NOTE — PROGRESS NOTE ADULT - ASSESSMENT
60 y/o male w/ pmhx of renal CA s/p nephrectomy, DM 2, HTN, presenting from home for eval of worsening right foot/great toe pain, erythema and inflammation w/ concerns of abscess/ cellulitis.     # SIRS due to cellulitis abscess of right hallux   - sepsis not present on admission and improving.   - s/p bedside I&D in ED   - MRI r/o osteomyelitis pending   - s/p vanco and ceftrixaone IV, started on cefepime and doxy by ID    - ID, podiatry and vascular consulted  - off loading for now     # DM 2   - A1C pending   - ISS and carb controlled diet.     # HTN   - cont w/ lisinopril and amlodipine     above plan discussed with pt, bedside RN, consultants and MD Guzman

## 2022-03-15 NOTE — PROGRESS NOTE ADULT - SUBJECTIVE AND OBJECTIVE BOX
Date of Service: 3/15/22  Chief Complaint : 62 yo m, Hx: HTN, NIDDM, Renal CA (in remission) - presents with cc: right big toe pain for the last 3 days, with subjective fevers, chills, and vomiting. Denies any objective fevers, cough, CP, SOB, abdominal pain, diarrhea, bloody stools, dysuric symptoms. Reports wound was present there since the fall 0.5 cm x 0.5 cm - but this weekend started to get swollen, red, and more painful. Has not sought care for wound, and was locally treating it, and does not follow with a podiatrist outpatient.  Patient is accompanied by wife and both are very pleasant. Note patient is septic in Emergency room.   3/15/22: Patient was seen by podiatry team. Patient denies any overnight pedal complaints. Patient denies any N/V/D/C/CP/SOB at this time.     REVIEW OF SYSTEMS:  CONSTITUTIONAL: No weakness, + fevers   EYES/ENT: No visual changes;  No vertigo or throat pain   NECK: No pain or stiffness  RESPIRATORY: No cough, wheezing, hemoptysis; No shortness of breath  CARDIOVASCULAR: No chest pain or palpitations  GASTROINTESTINAL: No abdominal or epigastric pain. No nausea, vomiting, or hematemesis; No diarrhea or constipation. No melena or hematochezia.  GENITOURINARY: No dysuria, frequency or hematuria  NEUROLOGICAL: No numbness or weakness  SKIN: Wound to R plantar hallux.   All other review of systems is negative unless indicated above    PMH:  Malignant Neoplasm of Kidney  Diabetes  Adrenal Adenoma  Anxiety  HTN (hypertension)    PSH:  History of Cholecystectomy  Basal Cell Carcinoma of Nose Excision  H/O partial nephrectomy    Allergies:  No Known Allergies    MEDICATIONS  (STANDING):  amLODIPine   Tablet 5 milliGRAM(s) Oral daily  cefTRIAXone   IVPB 1000 milliGRAM(s) IV Intermittent every 24 hours  dextrose 40% Gel 15 Gram(s) Oral once  dextrose 5%. 1000 milliLiter(s) (50 mL/Hr) IV Continuous <Continuous>  dextrose 50% Injectable 25 Gram(s) IV Push once  glucagon  Injectable 1 milliGRAM(s) IntraMuscular once  influenza   Vaccine 0.5 milliLiter(s) IntraMuscular once  insulin lispro (ADMELOG) corrective regimen sliding scale   SubCutaneous three times a day before meals  lisinopril 40 milliGRAM(s) Oral daily      MEDICATIONS  (PRN):  acetaminophen     Tablet .. 650 milliGRAM(s) Oral every 6 hours PRN Mild Pain (1 - 3)  aluminum hydroxide/magnesium hydroxide/simethicone Suspension 30 milliLiter(s) Oral every 4 hours PRN Dyspepsia  melatonin 3 milliGRAM(s) Oral at bedtime PRN Insomnia  ondansetron Injectable 4 milliGRAM(s) IV Push every 8 hours PRN Nausea and/or Vomiting          Vitals  Vital Signs Last 24 Hrs  T(C): 37.7 (14 Mar 2022 23:30), Max: 38.2 (14 Mar 2022 10:30)  T(F): 99.9 (14 Mar 2022 23:30), Max: 100.8 (14 Mar 2022 10:30)  HR: 102 (14 Mar 2022 23:30) (102 - 141)  BP: 153/70 (14 Mar 2022 23:30) (121/65 - 153/70)  BP(mean): --  RR: 18 (14 Mar 2022 23:30) (18 - 22)  SpO2: 99% (14 Mar 2022 23:30) (96% - 100%)    Physical Exam:   Constitutiona: NAD, alert;  Derm:    R hallux 0.5 x 0.5 cm, plantar ulceration, probe to bone +, malodor +, no pain on palpation, + swelling, + erythema/cellulitic changes, + pus drainage, + tunneling/tracking.    Vascular: Dorsalis Pedis and Posterior Tibial pulses non-palpable at this time.  Capillary re-fill time less then 3 seconds digits 1-5 bilateral.   Cyanotic appearing R distal hallux.   Neuro: Protective sensation diminished to the level of the digits bilateral.  MSK: Muscle strength 5/5 in all major muscle groups of Right lower extremity. 5/5 in all muscle groups of LLE;  .        Labs:                          14.0   15.51 )-----------( 287      ( 14 Mar 2022 10:21 )             41.1     WBC Trend  15.51<H> Date (03-14 @ 10:21)      Chem  03-14    130<L>  |  100  |  26<H>  ----------------------------<  421<H>  4.8   |  23  |  1.30    Ca    9.4      14 Mar 2022 10:21    TPro  8.4<H>  /  Alb  3.3  /  TBili  1.0  /  DBili  x   /  AST  14<L>  /  ALT  28  /  AlkPhos  80  03-14       Rad/Ekg  < from: Xray Foot AP + Lateral + Oblique, Right (03.14.22 @ 11:29) >  IMPRESSION: Chest unremarkable. Arterial calcifications of the foot. No   acute finding.    < end of copied text >

## 2022-03-16 LAB
ANION GAP SERPL CALC-SCNC: 8 MMOL/L — SIGNIFICANT CHANGE UP (ref 5–17)
BUN SERPL-MCNC: 17 MG/DL — SIGNIFICANT CHANGE UP (ref 7–23)
CALCIUM SERPL-MCNC: 9.2 MG/DL — SIGNIFICANT CHANGE UP (ref 8.5–10.1)
CHLORIDE SERPL-SCNC: 109 MMOL/L — HIGH (ref 96–108)
CO2 SERPL-SCNC: 20 MMOL/L — LOW (ref 22–31)
CREAT SERPL-MCNC: 0.82 MG/DL — SIGNIFICANT CHANGE UP (ref 0.5–1.3)
CULTURE RESULTS: NO GROWTH — SIGNIFICANT CHANGE UP
EGFR: 101 ML/MIN/1.73M2 — SIGNIFICANT CHANGE UP
GLUCOSE SERPL-MCNC: 240 MG/DL — HIGH (ref 70–99)
HCT VFR BLD CALC: 35.4 % — LOW (ref 39–50)
HGB BLD-MCNC: 11.9 G/DL — LOW (ref 13–17)
MCHC RBC-ENTMCNC: 27.5 PG — SIGNIFICANT CHANGE UP (ref 27–34)
MCHC RBC-ENTMCNC: 33.6 GM/DL — SIGNIFICANT CHANGE UP (ref 32–36)
MCV RBC AUTO: 81.8 FL — SIGNIFICANT CHANGE UP (ref 80–100)
PLATELET # BLD AUTO: 267 K/UL — SIGNIFICANT CHANGE UP (ref 150–400)
POTASSIUM SERPL-MCNC: 4.2 MMOL/L — SIGNIFICANT CHANGE UP (ref 3.5–5.3)
POTASSIUM SERPL-SCNC: 4.2 MMOL/L — SIGNIFICANT CHANGE UP (ref 3.5–5.3)
RBC # BLD: 4.33 M/UL — SIGNIFICANT CHANGE UP (ref 4.2–5.8)
RBC # FLD: 13.2 % — SIGNIFICANT CHANGE UP (ref 10.3–14.5)
SODIUM SERPL-SCNC: 137 MMOL/L — SIGNIFICANT CHANGE UP (ref 135–145)
SPECIMEN SOURCE: SIGNIFICANT CHANGE UP
WBC # BLD: 7.59 K/UL — SIGNIFICANT CHANGE UP (ref 3.8–10.5)
WBC # FLD AUTO: 7.59 K/UL — SIGNIFICANT CHANGE UP (ref 3.8–10.5)

## 2022-03-16 PROCEDURE — 99232 SBSQ HOSP IP/OBS MODERATE 35: CPT

## 2022-03-16 PROCEDURE — 93923 UPR/LXTR ART STDY 3+ LVLS: CPT | Mod: 26

## 2022-03-16 RX ORDER — INSULIN GLARGINE 100 [IU]/ML
12 INJECTION, SOLUTION SUBCUTANEOUS AT BEDTIME
Refills: 0 | Status: DISCONTINUED | OUTPATIENT
Start: 2022-03-16 | End: 2022-03-17

## 2022-03-16 RX ORDER — ENOXAPARIN SODIUM 100 MG/ML
40 INJECTION SUBCUTANEOUS EVERY 24 HOURS
Refills: 0 | Status: DISCONTINUED | OUTPATIENT
Start: 2022-03-16 | End: 2022-03-18

## 2022-03-16 RX ORDER — INSULIN LISPRO 100/ML
4 VIAL (ML) SUBCUTANEOUS ONCE
Refills: 0 | Status: COMPLETED | OUTPATIENT
Start: 2022-03-16 | End: 2022-03-16

## 2022-03-16 RX ADMIN — Medication 4: at 06:49

## 2022-03-16 RX ADMIN — Medication 100 MILLIGRAM(S): at 09:55

## 2022-03-16 RX ADMIN — AMLODIPINE BESYLATE 5 MILLIGRAM(S): 2.5 TABLET ORAL at 09:54

## 2022-03-16 RX ADMIN — CEFEPIME 100 MILLIGRAM(S): 1 INJECTION, POWDER, FOR SOLUTION INTRAMUSCULAR; INTRAVENOUS at 11:11

## 2022-03-16 RX ADMIN — Medication 100 MILLIGRAM(S): at 22:23

## 2022-03-16 RX ADMIN — Medication 10: at 16:48

## 2022-03-16 RX ADMIN — Medication 4 UNIT(S): at 22:23

## 2022-03-16 RX ADMIN — CEFEPIME 100 MILLIGRAM(S): 1 INJECTION, POWDER, FOR SOLUTION INTRAMUSCULAR; INTRAVENOUS at 22:22

## 2022-03-16 RX ADMIN — ENOXAPARIN SODIUM 40 MILLIGRAM(S): 100 INJECTION SUBCUTANEOUS at 16:48

## 2022-03-16 RX ADMIN — INSULIN GLARGINE 12 UNIT(S): 100 INJECTION, SOLUTION SUBCUTANEOUS at 22:23

## 2022-03-16 RX ADMIN — LISINOPRIL 40 MILLIGRAM(S): 2.5 TABLET ORAL at 09:55

## 2022-03-16 RX ADMIN — Medication 8: at 12:51

## 2022-03-16 NOTE — PROGRESS NOTE ADULT - ASSESSMENT
62 y/o male with h/o HTN, DM2, Renal CA (in remission) s/p L nephrectomy was admitted on 3/14 for right first toe pain, erythema x 4 days, with subjective fever and 1 episode vomiting. Patient states he developed an ulceration on his Rt first toe during the fall 2021, unable to recall what caused it and didn't realize it was there. Performed local care with neosporin but didn't seek medical attention for it. For the past 1-2 weeks his toe has gotten more swollen, red and uncomfortable. Reports hx of left 5th metatarsal infection d/t skin abrasion years ago, tx with abx and no sequalae.  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 In the ED, pt received IV vancomycin 1250mg x 1 and IV zosyn 3.375mg x 1, then ceftriaxone.     1. Febrile syndrome resolving. Possible sepsis. R first metatarsal non-healing plantar ulceration. Right foot cellulitis with probable underlying acute first MTP septic arthritis s/p I and D. Renal Ca s/p nephrectomy  -renal function is frail - ARF improving  - s/p vancomycin and zosyn in ED  - leukocytosis, elevated ESR and CRP  - wound c/s collected  - MRI noted  - on ceftriaxone 1g q24hr so far  - on doxycycline 100 mg PO q12h and cefepime 2 gm IV q12h # 2  -tolerating abx well so far; no side effects noted  -local wound care per podiatry   - continue with IV abx coverage  -will need longer term IV abx coverage   - f/u blood cultures  - podiatry consult appreciated  - vascular consult

## 2022-03-16 NOTE — PROGRESS NOTE ADULT - SUBJECTIVE AND OBJECTIVE BOX
Date of visit 3/16/2022    Vital Signs Last 24 Hrs  T(C): 37.1 (16 Mar 2022 07:44), Max: 38.2 (16 Mar 2022 10:30)  T(F): 98.8 (16 Mar 2022 07:44), Max: 100.8 (16 Mar 2022 10:30)  HR: 109 (16 Mar 2022 07:44) (102 - 110)  BP: 134/88 (16 Mar 2022 07:44) (121/65 - 153/70)  BP(mean): --  RR: 18 (16 Mar 2022 07:44) (18 - 18)  SpO2: 100% (16 Mar 2022 07:44) (96% - 100%)  Daily     Daily     PE:    Constitutional: laying in bed, alert, pleasant, NAD  HEENT: NC/AT, EOMI, PERRLA, conjunctivae clear;   Neck: supple; thyroid not palpable  Respiratory: respiratory effort normal; clear to auscultation  Cardiovascular: S1S2 regular, no murmurs  Abdomen: soft, not tender, not distended, positive BS;   Genitourinary: no suprapubic tenderness  Lymphatic: no LN palpable  Musculoskeletal: no crepitus, no muscle tenderness, no joint tenderness  Extremities: 1+ pitting edema of R foot, unable to assess pulses, cap refill <2sec b/l. R first metatarsal cool to touch.   Neurological/ Psychiatric: AxOx3, Judgement and insight normal;  moving all extremities  Skin: R first great toe / metatarsal plantar ulceration ~2 cm x 1cm with surrounding erythema, edema, local tenderness with scant discharge  Right great toe ulceration on medial side of toe, ecchymosis at MTP joint; erythema extending to forefoot    Labs: all available labs reviewed                        14.0   15.51 )-----------( 287      ( 14 Mar 2022 10:21 )             41.1     03-14    130<L>  |  100  |  26<H>  ----------------------------<  421<H>  4.8   |  23  |  1.30    Ca    9.4      14 Mar 2022 10:21    TPro  8.4<H>  /  Alb  3.3  /  TBili  1.0  /  DBili  x   /  AST  14<L>  /  ALT  28  /  AlkPhos  80  03-14     LIVER FUNCTIONS - ( 14 Mar 2022 10:21 )  Alb: 3.3 g/dL / Pro: 8.4 gm/dL / ALK PHOS: 80 U/L / ALT: 28 U/L / AST: 14 U/L / GGT: x           Sedimentation Rate, Erythrocyte (22 @ 10:21)    Sedimentation Rate, Erythrocyte: 41 mm/hr    C-Reactive Protein, Serum (22 @ 10:21)    C-Reactive Protein, Serum: 189 mg/L    Urinalysis Basic - ( 14 Mar 2022 20:45 )    Color: Yellow / Appearance: Clear / S.015 / pH: x  Gluc: x / Ketone: Trace  / Bili: Negative / Urobili: 4   Blood: x / Protein: 30 mg/dL / Nitrite: Negative   Leuk Esterase: Negative / RBC: 3-5 /HPF / WBC 3-5   Sq Epi: x / Non Sq Epi: Few / Bacteria: Few    MRI results noted    Radiology: all available radiological tests reviewed  < from: Xray Foot AP + Lateral + Oblique, Right (22 @ 11:29) >  Right foot. 3 views.    Mild to moderate degeneration of first MTP joint.    No bone destruction, fracture, or tracking air.    < end of copied text >    Advanced directives addressed: full resuscitation

## 2022-03-16 NOTE — PROGRESS NOTE ADULT - SUBJECTIVE AND OBJECTIVE BOX
62 yo, male h/o HTN, NIDDM, Renal CA (in remission) s/p nephrectomy,  presents with cc: right big toe pain for the last 3 days, with subjective fevers, chills, and vomiting. Patient states he developed an ulceration on his Rt halux a few months ago and he covered it with a bandaid. For the past 2 weeks his ulcer has gotten bigger and his toe has gotten more swollen,  red and painful. He finally told his wife and he came to the ED for evaluation.  In the ED podiatry performed an I&D of the halux abscess.    3/15: sitting up in bed. awaiting MRI right foot. no pain or discomfort at present.   3/16: no new issues. suspicious for osteo on MRI, discussed eventual need for Picc and IV abx when ready for dc. discussed dm2 management and glycemic control upon dc     REVIEW OF SYSTEMS:    CONSTITUTIONAL: No weakness, fevers or chills  EYES/ENT: No visual changes;  No vertigo or throat pain   NECK: No pain or stiffness  RESPIRATORY: No cough, wheezing, hemoptysis; No shortness of breath  CARDIOVASCULAR: No chest pain or palpitations  GASTROINTESTINAL: No abdominal or epigastric pain. No nausea, vomiting, or hematemesis; No diarrhea or constipation. No melena or hematochezia.  GENITOURINARY: No dysuria, frequency or hematuria  NEUROLOGICAL: No numbness or weakness  SKIN: No itching, burning, rashes, or lesions   All other review of systems is negative unless indicated above        Vital Signs Last 24 Hrs  T(C): 37 (16 Mar 2022 07:36), Max: 37.2 (15 Mar 2022 23:31)  T(F): 98.6 (16 Mar 2022 07:36), Max: 98.9 (15 Mar 2022 23:31)  HR: 90 (16 Mar 2022 07:36) (90 - 97)  BP: 159/82 (16 Mar 2022 07:36) (142/72 - 159/82)  BP(mean): --  RR: 18 (16 Mar 2022 07:36) (17 - 18)  SpO2: 100% (16 Mar 2022 07:36) (100% - 100%)    CAPILLARY BLOOD GLUCOSE      POCT Blood Glucose.: 315 mg/dL (16 Mar 2022 11:59)  POCT Blood Glucose.: 417 mg/dL (16 Mar 2022 11:57)  POCT Blood Glucose.: 240 mg/dL (16 Mar 2022 06:35)  POCT Blood Glucose.: 329 mg/dL (15 Mar 2022 21:46)  POCT Blood Glucose.: 289 mg/dL (15 Mar 2022 17:07)        PHYSICAL EXAM:    Constitutional: NAD, awake and alert, well-developed  HEENT: PERR, EOMI, Normal Hearing, MMM  Neck: Soft and supple, No LAD, No JVD  Respiratory: Breath sounds are clear bilaterally, No wheezing, rales or rhonchi  Cardiovascular: S1 and S2, regular rate and rhythm, no Murmurs, gallops or rubs  Gastrointestinal: Bowel Sounds present, soft, nontender, nondistended, no guarding, no rebound  Extremities: No peripheral edema  Vascular: 2+ peripheral pulses  Neurological: A/O x 3, no focal deficits  Musculoskeletal: 5/5 strength b/l upper and lower extremities  Skin: No rashes. large ulceration with surrounding cellulitis and purulent drainage to hallux s/p bedside I&D     MEDICATIONS  (STANDING):  amLODIPine   Tablet 5 milliGRAM(s) Oral daily  cefepime   IVPB 2000 milliGRAM(s) IV Intermittent every 12 hours  dextrose 40% Gel 15 Gram(s) Oral once  dextrose 5%. 1000 milliLiter(s) (50 mL/Hr) IV Continuous <Continuous>  dextrose 50% Injectable 25 Gram(s) IV Push once  doxycycline hyclate Capsule 100 milliGRAM(s) Oral every 12 hours  glucagon  Injectable 1 milliGRAM(s) IntraMuscular once  influenza   Vaccine 0.5 milliLiter(s) IntraMuscular once  insulin lispro (ADMELOG) corrective regimen sliding scale   SubCutaneous three times a day before meals  lisinopril 40 milliGRAM(s) Oral daily      LABS                         11.9   7.59  )-----------( 267      ( 16 Mar 2022 07:21 )             35.4     03-16    137  |  109<H>  |  17  ----------------------------<  240<H>  4.2   |  20<L>  |  0.82    Ca    9.2      16 Mar 2022 07:21      Urinalysis Basic - ( 14 Mar 2022 20:45 )    Color: Yellow / Appearance: Clear / S.015 / pH: x  Gluc: x / Ketone: Trace  / Bili: Negative / Urobili: 4   Blood: x / Protein: 30 mg/dL / Nitrite: Negative   Leuk Esterase: Negative / RBC: 3-5 /HPF / WBC 3-5   Sq Epi: x / Non Sq Epi: Few / Bacteria: Few      RADIOLOGY/EKG:  < from: Xray Foot AP + Lateral + Oblique, Right (22 @ 11:29) >    IMPRESSION: Chest unremarkable. Arterial calcifications of the foot. No   acute finding.    --- End of Report ---    ALEK RICKS MD; Attending Radiologist  This document has beenelectronically signed. Mar 14 2022 11:32AM    < end of copied text >

## 2022-03-16 NOTE — PROGRESS NOTE ADULT - SUBJECTIVE AND OBJECTIVE BOX
Date of Service: 3/16/22  Chief Complaint : 60 yo m, Hx: HTN, NIDDM, Renal CA (in remission) - presents with cc: right big toe pain for the last 3 days, with subjective fevers, chills, and vomiting. Denies any objective fevers, cough, CP, SOB, abdominal pain, diarrhea, bloody stools, dysuric symptoms. Reports wound was present there since the fall 0.5 cm x 0.5 cm - but this weekend started to get swollen, red, and more painful. Has not sought care for wound, and was locally treating it, and does not follow with a podiatrist outpatient.  Patient is accompanied by wife and both are very pleasant. Note patient is septic in Emergency room.   3/16/22: Patient was seen by podiatry team. Patient denies any overnight pedal complaints. Patient denies any N/V/D/C/CP/SOB at this time.     REVIEW OF SYSTEMS:  CONSTITUTIONAL: No weakness, + fevers   EYES/ENT: No visual changes;  No vertigo or throat pain   NECK: No pain or stiffness  RESPIRATORY: No cough, wheezing, hemoptysis; No shortness of breath  CARDIOVASCULAR: No chest pain or palpitations  GASTROINTESTINAL: No abdominal or epigastric pain. No nausea, vomiting, or hematemesis; No diarrhea or constipation. No melena or hematochezia.  GENITOURINARY: No dysuria, frequency or hematuria  NEUROLOGICAL: No numbness or weakness  SKIN: Wound to R plantar hallux.   All other review of systems is negative unless indicated above    PMH:  Malignant Neoplasm of Kidney  Diabetes  Adrenal Adenoma  Anxiety  HTN (hypertension)    PSH:  History of Cholecystectomy  Basal Cell Carcinoma of Nose Excision  H/O partial nephrectomy    Allergies:  No Known Allergies    MEDICATIONS  (STANDING):  amLODIPine   Tablet 5 milliGRAM(s) Oral daily  cefepime   IVPB 2000 milliGRAM(s) IV Intermittent every 12 hours  dextrose 40% Gel 15 Gram(s) Oral once  dextrose 5%. 1000 milliLiter(s) (50 mL/Hr) IV Continuous <Continuous>  dextrose 50% Injectable 25 Gram(s) IV Push once  doxycycline hyclate Capsule 100 milliGRAM(s) Oral every 12 hours  glucagon  Injectable 1 milliGRAM(s) IntraMuscular once  influenza   Vaccine 0.5 milliLiter(s) IntraMuscular once  insulin lispro (ADMELOG) corrective regimen sliding scale   SubCutaneous three times a day before meals  lisinopril 40 milliGRAM(s) Oral daily    MEDICATIONS  (PRN):  acetaminophen     Tablet .. 650 milliGRAM(s) Oral every 6 hours PRN Mild Pain (1 - 3)  aluminum hydroxide/magnesium hydroxide/simethicone Suspension 30 milliLiter(s) Oral every 4 hours PRN Dyspepsia  melatonin 3 milliGRAM(s) Oral at bedtime PRN Insomnia  ondansetron Injectable 4 milliGRAM(s) IV Push every 8 hours PRN Nausea and/or Vomiting          Vitals  Vital Signs Last 24 Hrs  T(C): 37.7 (14 Mar 2022 23:30), Max: 38.2 (14 Mar 2022 10:30)  T(F): 99.9 (14 Mar 2022 23:30), Max: 100.8 (14 Mar 2022 10:30)  HR: 102 (14 Mar 2022 23:30) (102 - 141)  BP: 153/70 (14 Mar 2022 23:30) (121/65 - 153/70)  BP(mean): --  RR: 18 (14 Mar 2022 23:30) (18 - 22)  SpO2: 99% (14 Mar 2022 23:30) (96% - 100%)    Physical Exam:   Constitutiona: NAD, alert;  Derm:    R hallux 0.5 x 0.5 cm, plantar ulceration, probe to bone +, malodor +, no pain on palpation, + swelling, + erythema/cellulitic changes, + pus drainage, + tunneling/tracking.    Vascular: Dorsalis Pedis and Posterior Tibial pulses non-palpable at this time.  Capillary re-fill time less then 3 seconds digits 1-5 bilateral.   Cyanotic appearing R distal hallux.   Neuro: Protective sensation diminished to the level of the digits bilateral.  MSK: Muscle strength 5/5 in all major muscle groups of Right lower extremity. 5/5 in all muscle groups of LLE;  .        Labs:                          11.9   7.59  )-----------( 267      ( 16 Mar 2022 07:21 )             35.4     03-16    137  |  109<H>  |  17  ----------------------------<  240<H>  4.2   |  20<L>  |  0.82    Ca    9.2      16 Mar 2022 07:21    TPro  8.4<H>  /  Alb  3.3  /  TBili  1.0  /  DBili  x   /  AST  14<L>  /  ALT  28  /  AlkPhos  80  03-14             Rad/Ekg  < from: Xray Foot AP + Lateral + Oblique, Right (03.14.22 @ 11:29) >  IMPRESSION: Chest unremarkable. Arterial calcifications of the foot. No   acute finding.    < end of copied text >    < from: MR Foot w/wo IV Cont, Right (03.15.22 @ 16:00) >  IMPRESSION:    1.  Plantar great toe skin wound with adjacent edema and enhancement   concerning for cellulitis. No drainable fluid collection.  2.  Small tract from the skin to the first interphalangeal joint space   raising concern for septic arthritis.  3.  Abnormal marrow signal involving the first proximal distal phalanges.   Given the overlying skin wound, the findings are concerning for   osteomyelitis.    --- End of Report ---    < end of copied text >

## 2022-03-16 NOTE — PROGRESS NOTE ADULT - ASSESSMENT
62 y/o male w/ pmhx of renal CA s/p nephrectomy, DM 2, HTN, presenting from home for eval of worsening right foot/great toe pain, erythema and inflammation w/ concerns of abscess/ cellulitis.     # SIRS due to cellulitis abscess/ osteomyelitis of right hallux   - sepsis not present on admission and improving.   - s/p bedside I&D   - MRI suspicious for osteomyelitis   - s/p vanco and ceftrixaone IV, started on cefepime and doxy by ID    - will need long term IV abx coverage upon DC   - ID, podiatry and vascular consulted   - off loading for now     # DM 2   - A1C 9.8   - ISS and carb controlled diet.     # HTN   - cont w/ lisinopril and amlodipine     above plan discussed with pt, bedside RN, consultants and MD Guzman      62 y/o male w/ pmhx of renal CA s/p nephrectomy, DM 2, HTN, presenting from home for eval of worsening right foot/great toe pain, erythema and inflammation w/ concerns of abscess/ cellulitis.     # SIRS due to cellulitis abscess/ osteomyelitis of right hallux   - sepsis not present on admission and improving.   - s/p bedside I&D   - MRI suspicious for osteomyelitis   - s/p vanco and ceftrixaone IV, started on cefepime and doxy by ID    - will need long term IV abx coverage upon DC   - ID, podiatry and vascular consulted   - off loading for now     # DM 2   - A1C 9.8   - ISS and carb controlled diet.     # HTN   - cont w/ lisinopril and amlodipine     vte ppx - lovenox 40 daily     above plan discussed with pt, bedside RN, consultants and MD Michelle

## 2022-03-16 NOTE — PROGRESS NOTE ADULT - ASSESSMENT
Assesment: 60 y/o male see inpatient for the following   -Non- Healing Full thickness Right proximal phalanx plantar ulceration, clinically infected   - OM of R proximal phalanx.   -Diabetes Mellitus type 2  -Pain in Right Foot   -Cellulitis of Right foot  -Difficulty with ambulation    Plan:   Chart reviewed and Patient evaluated; discussed treatment and plan with Dr. Robert Ramirez  Discussed diagnosis and treatment with patient  ED 3/14/22 Bedside Incision and drainage to right hallux, pus was drained from foot, pt tolerated well. Dressed R hallux with betadine soaked gauze, dsd, kerlix.   Will continue with wet to dry saline dressings when admitted for right hallux changes.   Consulted Vascular: appreciate recommendations   X-rays reviewed  official results noted above.   Official MRI results noted above.   Continue with IV antibiotics As Per ID/ED/Medicine.    Discussed importance of daily foot examinations and proper shoe gear and to importance of lower Fasting Blood Glucose levels.   Patient demonstrated verbal understanding of all interventions and tolerated interventions well without any complications.   Podiatry will follow while in house. Assesment: 60 y/o male see inpatient for the following   -Non- Healing Full thickness Right proximal phalanx plantar ulceration, clinically infected   - OM of R proximal phalanx.   -Diabetes Mellitus type 2  -Pain in Right Foot   -Cellulitis of Right foot  -Difficulty with ambulation    Plan:   Chart reviewed and Patient evaluated; discussed treatment and plan with Dr. Robert Ramirez  Discussed diagnosis and treatment with patient, including chronic nature of wound to right foot, and plan is to continue with local wound care and to monitor closely as wound may not resolve without surgery and may get worse. Patient understands this and is agreement of plan and will continue to closely monitor the wound.   Patient to follow with local wound care and follow up in 1 week after discharge with .   ED 3/14/22 Bedside Incision and drainage to right hallux, pus was drained from foot, pt tolerated well. Dressed R hallux with betadine soaked gauze, dsd, kerlix.   Will continue with wet to dry saline dressings when admitted for right hallux changes.   Consulted Vascular: appreciate recommendations   X-rays reviewed  official results noted above.   Official MRI results noted above.   Continue with IV antibiotics As Per ID/ED/Medicine.    Discussed importance of daily foot examinations and proper shoe gear and to importance of lower Fasting Blood Glucose levels.   Podiatry will follow while in house.

## 2022-03-17 PROCEDURE — 99232 SBSQ HOSP IP/OBS MODERATE 35: CPT

## 2022-03-17 RX ORDER — INSULIN LISPRO 100/ML
VIAL (ML) SUBCUTANEOUS AT BEDTIME
Refills: 0 | Status: DISCONTINUED | OUTPATIENT
Start: 2022-03-17 | End: 2022-03-18

## 2022-03-17 RX ORDER — INSULIN GLARGINE 100 [IU]/ML
20 INJECTION, SOLUTION SUBCUTANEOUS AT BEDTIME
Refills: 0 | Status: DISCONTINUED | OUTPATIENT
Start: 2022-03-17 | End: 2022-03-18

## 2022-03-17 RX ADMIN — INSULIN GLARGINE 20 UNIT(S): 100 INJECTION, SOLUTION SUBCUTANEOUS at 22:05

## 2022-03-17 RX ADMIN — Medication 100 MILLIGRAM(S): at 22:04

## 2022-03-17 RX ADMIN — Medication 100 MILLIGRAM(S): at 09:44

## 2022-03-17 RX ADMIN — LISINOPRIL 40 MILLIGRAM(S): 2.5 TABLET ORAL at 09:45

## 2022-03-17 RX ADMIN — ENOXAPARIN SODIUM 40 MILLIGRAM(S): 100 INJECTION SUBCUTANEOUS at 16:57

## 2022-03-17 RX ADMIN — CEFEPIME 100 MILLIGRAM(S): 1 INJECTION, POWDER, FOR SOLUTION INTRAMUSCULAR; INTRAVENOUS at 09:44

## 2022-03-17 RX ADMIN — Medication 3 MILLIGRAM(S): at 22:05

## 2022-03-17 RX ADMIN — CEFEPIME 100 MILLIGRAM(S): 1 INJECTION, POWDER, FOR SOLUTION INTRAMUSCULAR; INTRAVENOUS at 22:03

## 2022-03-17 RX ADMIN — Medication 2: at 22:04

## 2022-03-17 RX ADMIN — Medication 8: at 18:31

## 2022-03-17 RX ADMIN — Medication 4: at 06:48

## 2022-03-17 RX ADMIN — AMLODIPINE BESYLATE 5 MILLIGRAM(S): 2.5 TABLET ORAL at 09:44

## 2022-03-17 RX ADMIN — Medication 8: at 11:43

## 2022-03-17 NOTE — PROGRESS NOTE ADULT - ASSESSMENT
60 y/o male with h/o HTN, DM2, Renal CA (in remission) s/p L nephrectomy was admitted on 3/14 for right first toe pain, erythema x 4 days, with subjective fever and 1 episode vomiting. Patient states he developed an ulceration on his Rt first toe during the fall 2021, unable to recall what caused it and didn't realize it was there. Performed local care with neosporin but didn't seek medical attention for it. For the past 1-2 weeks his toe has gotten more swollen, red and uncomfortable. Reports hx of left 5th metatarsal infection d/t skin abrasion years ago, tx with abx and no sequalae.  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 In the ED, pt received IV vancomycin 1250mg x 1 and IV zosyn 3.375mg x 1, then ceftriaxone.     1. Febrile syndrome resolving. Possible sepsis. R first metatarsal non-healing plantar ulceration. Right foot cellulitis with probable underlying acute first MTP septic arthritis s/p I and D. Renal Ca s/p nephrectomy  -renal function is frail - ARF improving  - s/p vancomycin and zosyn in ED  - leukocytosis, elevated ESR and CRP  - wound c/s collected  - MRI noted  - s/p ceftriaxone 1g q24hr so far  - on doxycycline 100 mg PO q12h and cefepime 2 gm IV q12h # 3  -tolerating abx well so far; no side effects noted  -local wound care per podiatry   - continue with IV abx coverage  -will need longer term IV abx coverage   -plan for PICC line when ready for discharge  - f/u blood cultures  - podiatry consult appreciated  -monitor temps  -f/u CBC  -supportive care  2. Other issues:   -care per medicine

## 2022-03-17 NOTE — CDI QUERY NOTE - NSCDI_DOCCLARIFY_GEN_ALL_CORE_FT
In responding to this request, please exercise your independent professional judgment.  The fact that a question is asked does not imply that any particular answer is desired or expected.
07-Jan-2020 02:12

## 2022-03-17 NOTE — PROGRESS NOTE ADULT - SUBJECTIVE AND OBJECTIVE BOX
62 yo, male h/o HTN, NIDDM, Renal CA (in remission) s/p nephrectomy,  presents with cc: right big toe pain for the last 3 days, with subjective fevers, chills, and vomiting. Patient states he developed an ulceration on his Rt halux a few months ago and he covered it with a bandaid. For the past 2 weeks his ulcer has gotten bigger and his toe has gotten more swollen,  red and painful. He finally told his wife and he came to the ED for evaluation.  In the ED podiatry performed an I&D of the halux abscess.    3/15: sitting up in bed. awaiting MRI right foot. no pain or discomfort at present.   3/16: no new issues. suspicious for osteo on MRI, discussed eventual need for Picc and IV abx when ready for dc. discussed dm2 management and glycemic control upon dc   3/17: sitting up in bed, daughter at bedside. still with hyperglycemia, increased lantus. agreeable to insulin upon DC.     REVIEW OF SYSTEMS:    CONSTITUTIONAL: No weakness, fevers or chills  EYES/ENT: No visual changes;  No vertigo or throat pain   NECK: No pain or stiffness  RESPIRATORY: No cough, wheezing, hemoptysis; No shortness of breath  CARDIOVASCULAR: No chest pain or palpitations  GASTROINTESTINAL: No abdominal or epigastric pain. No nausea, vomiting, or hematemesis; No diarrhea or constipation. No melena or hematochezia.  GENITOURINARY: No dysuria, frequency or hematuria  NEUROLOGICAL: No numbness or weakness  SKIN: No itching, burning, rashes, or lesions   All other review of systems is negative unless indicated above          CAPILLARY BLOOD GLUCOSE      POCT Blood Glucose.: 315 mg/dL (16 Mar 2022 11:59)  POCT Blood Glucose.: 417 mg/dL (16 Mar 2022 11:57)  POCT Blood Glucose.: 240 mg/dL (16 Mar 2022 06:35)  POCT Blood Glucose.: 329 mg/dL (15 Mar 2022 21:46)  POCT Blood Glucose.: 289 mg/dL (15 Mar 2022 17:07)        PHYSICAL EXAM:    Constitutional: NAD, awake and alert, well-developed  HEENT: PERR, EOMI, Normal Hearing, MMM  Neck: Soft and supple, No LAD, No JVD  Respiratory: Breath sounds are clear bilaterally, No wheezing, rales or rhonchi  Cardiovascular: S1 and S2, regular rate and rhythm, no Murmurs, gallops or rubs  Gastrointestinal: Bowel Sounds present, soft, nontender, nondistended, no guarding, no rebound  Extremities: No peripheral edema  Vascular: 2+ peripheral pulses  Neurological: A/O x 3, no focal deficits  Musculoskeletal: 5/5 strength b/l upper and lower extremities  Skin: No rashes. large ulceration with surrounding cellulitis and purulent drainage to hallux s/p bedside I&D     MEDICATIONS  (STANDING):  amLODIPine   Tablet 5 milliGRAM(s) Oral daily  cefepime   IVPB 2000 milliGRAM(s) IV Intermittent every 12 hours  dextrose 40% Gel 15 Gram(s) Oral once  dextrose 5%. 1000 milliLiter(s) (50 mL/Hr) IV Continuous <Continuous>  dextrose 50% Injectable 25 Gram(s) IV Push once  doxycycline hyclate Capsule 100 milliGRAM(s) Oral every 12 hours  glucagon  Injectable 1 milliGRAM(s) IntraMuscular once  influenza   Vaccine 0.5 milliLiter(s) IntraMuscular once  insulin lispro (ADMELOG) corrective regimen sliding scale   SubCutaneous three times a day before meals  lisinopril 40 milliGRAM(s) Oral daily      LABS                         11.9   7.59  )-----------( 267      ( 16 Mar 2022 07:21 )             35.4     03-16    137  |  109<H>  |  17  ----------------------------<  240<H>  4.2   |  20<L>  |  0.82    Ca    9.2      16 Mar 2022 07:21      Urinalysis Basic - ( 14 Mar 2022 20:45 )    Color: Yellow / Appearance: Clear / S.015 / pH: x  Gluc: x / Ketone: Trace  / Bili: Negative / Urobili: 4   Blood: x / Protein: 30 mg/dL / Nitrite: Negative   Leuk Esterase: Negative / RBC: 3-5 /HPF / WBC 3-5   Sq Epi: x / Non Sq Epi: Few / Bacteria: Few      RADIOLOGY/EKG:  < from: Xray Foot AP + Lateral + Oblique, Right (22 @ 11:29) >    IMPRESSION: Chest unremarkable. Arterial calcifications of the foot. No   acute finding.    --- End of Report ---    ALEK RICKS MD; Attending Radiologist  This document has beenelectronically signed. Mar 14 2022 11:32AM    < end of copied text >           62 yo, male h/o HTN, NIDDM, Renal CA (in remission) s/p nephrectomy,  presents with cc: right big toe pain for the last 3 days, with subjective fevers, chills, and vomiting. Patient states he developed an ulceration on his Rt halux a few months ago and he covered it with a bandaid. For the past 2 weeks his ulcer has gotten bigger and his toe has gotten more swollen,  red and painful. He finally told his wife and he came to the ED for evaluation.  In the ED podiatry performed an I&D of the halux abscess.    3/15: sitting up in bed. awaiting MRI right foot. no pain or discomfort at present.   3/16: no new issues. suspicious for osteo on MRI, discussed eventual need for Picc and IV abx when ready for dc. discussed dm2 management and glycemic control upon dc   3/17: sitting up in bed, daughter at bedside. still with hyperglycemia, increased lantus. agreeable to insulin upon DC.     REVIEW OF SYSTEMS:    CONSTITUTIONAL: No weakness, fevers or chills  EYES/ENT: No visual changes;  No vertigo or throat pain   NECK: No pain or stiffness  RESPIRATORY: No cough, wheezing, hemoptysis; No shortness of breath  CARDIOVASCULAR: No chest pain or palpitations  GASTROINTESTINAL: No abdominal or epigastric pain. No nausea, vomiting, or hematemesis; No diarrhea or constipation. No melena or hematochezia.  GENITOURINARY: No dysuria, frequency or hematuria  NEUROLOGICAL: No numbness or weakness  SKIN: No itching, burning, rashes, or lesions   All other review of systems is negative unless indicated above      vitals   temp 98.2, HR 90, /89, RR, 18, SPO2 98 room air     CAPILLARY BLOOD GLUCOSE      POCT Blood Glucose.: 315 mg/dL (16 Mar 2022 11:59)  POCT Blood Glucose.: 417 mg/dL (16 Mar 2022 11:57)  POCT Blood Glucose.: 240 mg/dL (16 Mar 2022 06:35)  POCT Blood Glucose.: 329 mg/dL (15 Mar 2022 21:46)  POCT Blood Glucose.: 289 mg/dL (15 Mar 2022 17:07)        PHYSICAL EXAM:    Constitutional: NAD, awake and alert, well-developed  HEENT: PERR, EOMI, Normal Hearing, MMM  Neck: Soft and supple, No LAD, No JVD  Respiratory: Breath sounds are clear bilaterally, No wheezing, rales or rhonchi  Cardiovascular: S1 and S2, regular rate and rhythm, no Murmurs, gallops or rubs  Gastrointestinal: Bowel Sounds present, soft, nontender, nondistended, no guarding, no rebound  Extremities: No peripheral edema  Vascular: 2+ peripheral pulses  Neurological: A/O x 3, no focal deficits  Musculoskeletal: 5/5 strength b/l upper and lower extremities  Skin: No rashes. large ulceration with surrounding cellulitis and purulent drainage to hallux s/p bedside I&D     MEDICATIONS  (STANDING):  amLODIPine   Tablet 5 milliGRAM(s) Oral daily  cefepime   IVPB 2000 milliGRAM(s) IV Intermittent every 12 hours  dextrose 40% Gel 15 Gram(s) Oral once  dextrose 5%. 1000 milliLiter(s) (50 mL/Hr) IV Continuous <Continuous>  dextrose 50% Injectable 25 Gram(s) IV Push once  doxycycline hyclate Capsule 100 milliGRAM(s) Oral every 12 hours  glucagon  Injectable 1 milliGRAM(s) IntraMuscular once  influenza   Vaccine 0.5 milliLiter(s) IntraMuscular once  insulin lispro (ADMELOG) corrective regimen sliding scale   SubCutaneous three times a day before meals  lisinopril 40 milliGRAM(s) Oral daily      LABS                         11.9   7.59  )-----------( 267      ( 16 Mar 2022 07:21 )             35.4     03-16    137  |  109<H>  |  17  ----------------------------<  240<H>  4.2   |  20<L>  |  0.82    Ca    9.2      16 Mar 2022 07:21      Urinalysis Basic - ( 14 Mar 2022 20:45 )    Color: Yellow / Appearance: Clear / S.015 / pH: x  Gluc: x / Ketone: Trace  / Bili: Negative / Urobili: 4   Blood: x / Protein: 30 mg/dL / Nitrite: Negative   Leuk Esterase: Negative / RBC: 3-5 /HPF / WBC 3-5   Sq Epi: x / Non Sq Epi: Few / Bacteria: Few    abscess culture resulted with gram positive cocci in pairs and chains     RADIOLOGY/EKG:  < from: Xray Foot AP + Lateral + Oblique, Right (22 @ 11:29) >    IMPRESSION: Chest unremarkable. Arterial calcifications of the foot. No   acute finding.    --- End of Report ---    ALEK RICKS MD; Attending Radiologist  This document has beenelectronically signed. Mar 14 2022 11:32AM    < end of copied text >

## 2022-03-17 NOTE — PROGRESS NOTE ADULT - ASSESSMENT
62 y/o male w/ pmhx of renal CA s/p nephrectomy, DM 2, HTN, presenting from home for eval of worsening right foot/great toe pain, erythema and inflammation w/ concerns of abscess/ cellulitis.     # SIRS due to cellulitis abscess/ osteomyelitis of right hallux   - sepsis not present on admission and improving.   - s/p bedside I&D   - MRI suspicious for osteomyelitis   - s/p vanco and ceftrixaone IV, started on cefepime and doxy by ID    - will need long term IV abx coverage upon DC   - ID, podiatry and vascular consulted   - off loading for now     # DM 2   - A1C 9.8   - ISS and carb controlled diet.     # HTN   - cont w/ lisinopril and amlodipine     vte ppx - lovenox 40 daily     above plan discussed with pt, bedside RN, consultants and MD Michelle      62 y/o male w/ pmhx of renal CA s/p nephrectomy, DM 2, HTN, presenting from home for eval of worsening right foot/great toe pain, erythema and inflammation w/ concerns of abscess/ cellulitis.     # SIRS due to cellulitis abscess/ osteomyelitis of right hallux   - sepsis not present on admission and improving.   - s/p bedside I&D   - MRI suspicious for osteomyelitis   - abscess culture gram positive cocci in pairs and chains - sensitivities pending   - s/p vanco and ceftrixaone IV, started on cefepime and doxy by ID    - will need long term IV abx coverage upon DC   - ID, podiatry and vascular consulted   - off loading for now     # DM 2   - A1C 9.8   - ISS, lantus 20 and carb controlled diet.     # HTN   - cont w/ lisinopril and amlodipine     vte ppx - lovenox 40 daily     above plan discussed with pt, bedside RN, consultants and MD Michelle

## 2022-03-17 NOTE — PROGRESS NOTE ADULT - SUBJECTIVE AND OBJECTIVE BOX
Date of Service: 3/17/22  Chief Complaint : 62 yo m, Hx: HTN, NIDDM, Renal CA (in remission) - presents with cc: right big toe pain for the last 3 days, with subjective fevers, chills, and vomiting. Denies any objective fevers, cough, CP, SOB, abdominal pain, diarrhea, bloody stools, dysuric symptoms. Reports wound was present there since the fall 0.5 cm x 0.5 cm - but this weekend started to get swollen, red, and more painful. Has not sought care for wound, and was locally treating it, and does not follow with a podiatrist outpatient.  Patient is accompanied by wife and both are very pleasant. Note patient is septic in Emergency room.   3/17/22: Patient was seen by podiatry for follow up of Right great toe infection. Pt resting comfortably in bed, NAD. Denies any overnight events or new pedal complaints.  Denies any N/V/D/C/CP/SOB.    REVIEW OF SYSTEMS:  CONSTITUTIONAL: No weakness, + fevers   EYES/ENT: No visual changes;  No vertigo or throat pain   NECK: No pain or stiffness  RESPIRATORY: No cough, wheezing, hemoptysis; No shortness of breath  CARDIOVASCULAR: No chest pain or palpitations  GASTROINTESTINAL: No abdominal or epigastric pain. No nausea, vomiting, or hematemesis; No diarrhea or constipation. No melena or hematochezia.  GENITOURINARY: No dysuria, frequency or hematuria  NEUROLOGICAL: No numbness or weakness  SKIN: Wound to R plantar hallux.   All other review of systems is negative unless indicated above    PMH:  Malignant Neoplasm of Kidney  Diabetes  Adrenal Adenoma  Anxiety  HTN (hypertension)    PSH:  History of Cholecystectomy  Basal Cell Carcinoma of Nose Excision  H/O partial nephrectomy    Allergies:  No Known Allergies    MEDICATIONS  (STANDING):  amLODIPine   Tablet 5 milliGRAM(s) Oral daily  cefepime   IVPB 2000 milliGRAM(s) IV Intermittent every 12 hours  dextrose 40% Gel 15 Gram(s) Oral once  dextrose 5%. 1000 milliLiter(s) (50 mL/Hr) IV Continuous <Continuous>  dextrose 50% Injectable 25 Gram(s) IV Push once  doxycycline hyclate Capsule 100 milliGRAM(s) Oral every 12 hours  glucagon  Injectable 1 milliGRAM(s) IntraMuscular once  influenza   Vaccine 0.5 milliLiter(s) IntraMuscular once  insulin lispro (ADMELOG) corrective regimen sliding scale   SubCutaneous three times a day before meals  lisinopril 40 milliGRAM(s) Oral daily    MEDICATIONS  (PRN):  acetaminophen     Tablet .. 650 milliGRAM(s) Oral every 6 hours PRN Mild Pain (1 - 3)  aluminum hydroxide/magnesium hydroxide/simethicone Suspension 30 milliLiter(s) Oral every 4 hours PRN Dyspepsia  melatonin 3 milliGRAM(s) Oral at bedtime PRN Insomnia  ondansetron Injectable 4 milliGRAM(s) IV Push every 8 hours PRN Nausea and/or Vomiting    Vitals  Vital Signs Last 24 Hrs  T(C): 36.8 (17 Mar 2022 07:42), Max: 36.8 (16 Mar 2022 15:21)  T(F): 98.2 (17 Mar 2022 07:42), Max: 98.3 (16 Mar 2022 15:21)  HR: 90 (17 Mar 2022 07:42) (86 - 99)  BP: 103/89 (17 Mar 2022 07:42) (103/89 - 145/71)  BP(mean): 88 (16 Mar 2022 15:21) (88 - 88)  RR: 18 (17 Mar 2022 07:42) (18 - 18)  SpO2: 98% (17 Mar 2022 07:42) (98% - 100%)    Physical Exam:   Constitutiona: NAD, alert;  Derm:    R hallux 0.5 x 0.5 cm, plantar ulceration, probe to bone +, malodor -, no pain on palpation, + swelling, + erythema/cellulitic changes, + purulent drainage, + tunneling/tracking.  Dorsal cellulitis improving localized to Rt great toe and first interspace.  Vascular: Dorsalis Pedis and Posterior Tibial pulses non-palpable at this time.  Capillary re-fill time less then 3 seconds digits 1-5 bilateral.   Cyanotic appearing R distal hallux.   Neuro: Protective sensation diminished to the level of the digits bilateral.  MSK: Muscle strength 5/5 in all major muscle groups of Right lower extremity. 5/5 in all muscle groups of LLE;  .        Labs:                                           11.9   7.59  )-----------( 267      ( 16 Mar 2022 07:21 )             35.4     03-16    137  |  109<H>  |  17  ----------------------------<  240<H>  4.2   |  20<L>  |  0.82    Ca    9.2      16 Mar 2022 07:21    TPro  8.4<H>  /  Alb  3.3  /  TBili  1.0  /  DBili  x   /  AST  14<L>  /  ALT  28  /  AlkPhos  80  03-14             Rad/Ekg  < from: Xray Foot AP + Lateral + Oblique, Right (03.14.22 @ 11:29) >  IMPRESSION: Chest unremarkable. Arterial calcifications of the foot. No   acute finding.    < end of copied text >    < from: MR Foot w/wo IV Cont, Right (03.15.22 @ 16:00) >  IMPRESSION:    1.  Plantar great toe skin wound with adjacent edema and enhancement   concerning for cellulitis. No drainable fluid collection.  2.  Small tract from the skin to the first interphalangeal joint space   raising concern for septic arthritis.  3.  Abnormal marrow signal involving the first proximal distal phalanges.   Given the overlying skin wound, the findings are concerning for   osteomyelitis.    --- End of Report ---    < end of copied text >    < from: [facility] Physiol Extremity Lower 3+ Level, BI (03.16.22 @ 13:28) >  FINDINGS: There are biphasic pulse volume recordings bilaterally. Vessels   are noncompressible in the upper right thigh, calves and ankles.   Therefore, ABIs could not be calculated.    IMPRESSION: ABIs could not be calculated.    Multiple calcified, noncompressible vessels as described above.    < end of copied text >     Date of Service: 3/17/22  Chief Complaint : 62 yo m, Hx: HTN, NIDDM, Renal CA (in remission) - presents with cc: right big toe pain for the last 3 days, with subjective fevers, chills, and vomiting. Denies any objective fevers, cough, CP, SOB, abdominal pain, diarrhea, bloody stools, dysuric symptoms. Reports wound was present there since the fall 0.5 cm x 0.5 cm - but this weekend started to get swollen, red, and more painful. Has not sought care for wound, and was locally treating it, and does not follow with a podiatrist outpatient.  Patient is accompanied by wife and both are very pleasant. Note patient is septic in Emergency room.   3/17/22: Patient was seen by podiatry for follow up of Right great toe infection. Pt resting comfortably in bed, NAD. Denies any overnight events or new pedal complaints.  Denies any N/V/D/C/CP/SOB.    REVIEW OF SYSTEMS:  CONSTITUTIONAL: No weakness, + fevers   EYES/ENT: No visual changes;  No vertigo or throat pain   NECK: No pain or stiffness  RESPIRATORY: No cough, wheezing, hemoptysis; No shortness of breath  CARDIOVASCULAR: No chest pain or palpitations  GASTROINTESTINAL: No abdominal or epigastric pain. No nausea, vomiting, or hematemesis; No diarrhea or constipation. No melena or hematochezia.  GENITOURINARY: No dysuria, frequency or hematuria  NEUROLOGICAL: No numbness or weakness  SKIN: Wound to R plantar hallux.   All other review of systems is negative unless indicated above    PMH:  Malignant Neoplasm of Kidney  Diabetes  Adrenal Adenoma  Anxiety  HTN (hypertension)    PSH:  History of Cholecystectomy  Basal Cell Carcinoma of Nose Excision  H/O partial nephrectomy    Allergies:  No Known Allergies    MEDICATIONS  (STANDING):  amLODIPine   Tablet 5 milliGRAM(s) Oral daily  cefepime   IVPB 2000 milliGRAM(s) IV Intermittent every 12 hours  dextrose 40% Gel 15 Gram(s) Oral once  dextrose 5%. 1000 milliLiter(s) (50 mL/Hr) IV Continuous <Continuous>  dextrose 50% Injectable 25 Gram(s) IV Push once  doxycycline hyclate Capsule 100 milliGRAM(s) Oral every 12 hours  glucagon  Injectable 1 milliGRAM(s) IntraMuscular once  influenza   Vaccine 0.5 milliLiter(s) IntraMuscular once  insulin lispro (ADMELOG) corrective regimen sliding scale   SubCutaneous three times a day before meals  lisinopril 40 milliGRAM(s) Oral daily    MEDICATIONS  (PRN):  acetaminophen     Tablet .. 650 milliGRAM(s) Oral every 6 hours PRN Mild Pain (1 - 3)  aluminum hydroxide/magnesium hydroxide/simethicone Suspension 30 milliLiter(s) Oral every 4 hours PRN Dyspepsia  melatonin 3 milliGRAM(s) Oral at bedtime PRN Insomnia  ondansetron Injectable 4 milliGRAM(s) IV Push every 8 hours PRN Nausea and/or Vomiting    Vitals  Vital Signs Last 24 Hrs  T(C): 36.8 (17 Mar 2022 07:42), Max: 36.8 (16 Mar 2022 15:21)  T(F): 98.2 (17 Mar 2022 07:42), Max: 98.3 (16 Mar 2022 15:21)  HR: 90 (17 Mar 2022 07:42) (86 - 99)  BP: 103/89 (17 Mar 2022 07:42) (103/89 - 145/71)  BP(mean): 88 (16 Mar 2022 15:21) (88 - 88)  RR: 18 (17 Mar 2022 07:42) (18 - 18)  SpO2: 98% (17 Mar 2022 07:42) (98% - 100%)    Physical Exam:   Constitutiona: NAD, alert;  Derm:    R hallux 0.5 x 0.5 cm, plantar ulceration, probe to bone +, malodor -, no pain on palpation, + swelling, + erythema/cellulitic changes, + purulent drainage, + tunneling/tracking.  Dorsal cellulitis improving localized to Rt great toe and first interspace.  Vascular: Dorsalis Pedis and Posterior Tibial pulses palpable 1/4.  Capillary re-fill time less then 3 seconds digits 2-5 on Rt and greater than 5 sconds to hallux on Rt.    Cyanotic appearing R distal hallux.   Neuro: Protective sensation diminished to the level of the digits bilateral.  MSK: Muscle strength 5/5 in all major muscle groups of Right lower extremity. 5/5 in all muscle groups of LLE;  .        Labs:                                           11.9   7.59  )-----------( 267      ( 16 Mar 2022 07:21 )             35.4     03-16    137  |  109<H>  |  17  ----------------------------<  240<H>  4.2   |  20<L>  |  0.82    Ca    9.2      16 Mar 2022 07:21    TPro  8.4<H>  /  Alb  3.3  /  TBili  1.0  /  DBili  x   /  AST  14<L>  /  ALT  28  /  AlkPhos  80  03-14             Rad/Ekg  < from: Xray Foot AP + Lateral + Oblique, Right (03.14.22 @ 11:29) >  IMPRESSION: Chest unremarkable. Arterial calcifications of the foot. No   acute finding.    < end of copied text >    < from: MR Foot w/wo IV Cont, Right (03.15.22 @ 16:00) >  IMPRESSION:    1.  Plantar great toe skin wound with adjacent edema and enhancement   concerning for cellulitis. No drainable fluid collection.  2.  Small tract from the skin to the first interphalangeal joint space   raising concern for septic arthritis.  3.  Abnormal marrow signal involving the first proximal distal phalanges.   Given the overlying skin wound, the findings are concerning for   osteomyelitis.    --- End of Report ---    < end of copied text >    < from: [facility] Physiol Extremity Lower 3+ Level, BI (03.16.22 @ 13:28) >  FINDINGS: There are biphasic pulse volume recordings bilaterally. Vessels   are noncompressible in the upper right thigh, calves and ankles.   Therefore, ABIs could not be calculated.    IMPRESSION: ABIs could not be calculated.    Multiple calcified, noncompressible vessels as described above.    < end of copied text >

## 2022-03-17 NOTE — PROGRESS NOTE ADULT - SUBJECTIVE AND OBJECTIVE BOX
Date of service: 22 @ 10:34    Has right foot discomfort  No fever  Foot feels less swollen    ROS: no fever or chills; denies dizziness, no HA, no SOB or cough, no abdominal pain, no diarrhea or constipation; no dysuria    MEDICATIONS  (STANDING):  amLODIPine   Tablet 5 milliGRAM(s) Oral daily  cefepime   IVPB 2000 milliGRAM(s) IV Intermittent every 12 hours  dextrose 40% Gel 15 Gram(s) Oral once  dextrose 5%. 1000 milliLiter(s) (50 mL/Hr) IV Continuous <Continuous>  dextrose 50% Injectable 25 Gram(s) IV Push once  doxycycline hyclate Capsule 100 milliGRAM(s) Oral every 12 hours  enoxaparin Injectable 40 milliGRAM(s) SubCutaneous every 24 hours  glucagon  Injectable 1 milliGRAM(s) IntraMuscular once  influenza   Vaccine 0.5 milliLiter(s) IntraMuscular once  insulin glargine Injectable (LANTUS) 20 Unit(s) SubCutaneous at bedtime  insulin lispro (ADMELOG) corrective regimen sliding scale   SubCutaneous three times a day before meals  lisinopril 40 milliGRAM(s) Oral daily    Vital Signs Last 24 Hrs  T(C): 36.8 (17 Mar 2022 07:42), Max: 36.8 (16 Mar 2022 15:21)  T(F): 98.2 (17 Mar 2022 07:42), Max: 98.3 (16 Mar 2022 15:21)  HR: 90 (17 Mar 2022 07:42) (86 - 99)  BP: 103/89 (17 Mar 2022 07:42) (103/89 - 145/71)  BP(mean): 88 (16 Mar 2022 15:21) (88 - 88)  RR: 18 (17 Mar 2022 07:42) (18 - 18)  SpO2: 98% (17 Mar 2022 07:42) (98% - 100%)     Physical exam:    Constitutional: laying in bed, alert, pleasant, NAD  HEENT: NC/AT, EOMI, PERRLA, conjunctivae clear;   Neck: supple; thyroid not palpable  Respiratory: respiratory effort normal; clear to auscultation  Cardiovascular: S1S2 regular, no murmurs  Abdomen: soft, not tender, not distended, positive BS;   Genitourinary: no suprapubic tenderness  Lymphatic: no LN palpable  Musculoskeletal: no crepitus, no muscle tenderness, no joint tenderness  Extremities: 1+ pitting edema of R foot, unable to assess pulses, cap refill <2sec b/l. R first metatarsal cool to touch.   Neurological/ Psychiatric: AxOx3, Judgement and insight normal;  moving all extremities  Skin: R first great toe / metatarsal plantar ulceration ~2 cm x 1cm with surrounding erythema, edema, local tenderness with scant discharge  Right great toe ulceration on medial side of toe, ecchymosis at MTP joint; erythema extending to forefoot    Labs: reviewed                        11.9   7.59  )-----------( 267      ( 16 Mar 2022 07:21 )             35.4     03-16    137  |  109<H>  |  17  ----------------------------<  240<H>  4.2   |  20<L>  |  0.82    Ca    9.2      16 Mar 2022 07:21    C-Reactive Protein, Serum: 189 mg/L (22 @ 10:21)                        14.0   15.51 )-----------( 287      ( 14 Mar 2022 10:21 )             41.1     03-14    130<L>  |  100  |  26<H>  ----------------------------<  421<H>  4.8   |  23  |  1.30    Ca    9.4      14 Mar 2022 10:21    TPro  8.4<H>  /  Alb  3.3  /  TBili  1.0  /  DBili  x   /  AST  14<L>  /  ALT  28  /  AlkPhos  80  03-14     LIVER FUNCTIONS - ( 14 Mar 2022 10:21 )  Alb: 3.3 g/dL / Pro: 8.4 gm/dL / ALK PHOS: 80 U/L / ALT: 28 U/L / AST: 14 U/L / GGT: x           Sedimentation Rate, Erythrocyte (22 @ 10:21)    Sedimentation Rate, Erythrocyte: 41 mm/hr    C-Reactive Protein, Serum (22 @ 10:21)    C-Reactive Protein, Serum: 189 mg/L    Urinalysis Basic - ( 14 Mar 2022 20:45 )    Color: Yellow / Appearance: Clear / S.015 / pH: x  Gluc: x / Ketone: Trace  / Bili: Negative / Urobili: 4   Blood: x / Protein: 30 mg/dL / Nitrite: Negative   Leuk Esterase: Negative / RBC: 3-5 /HPF / WBC 3-5   Sq Epi: x / Non Sq Epi: Few / Bacteria: Few    MRI results noted    Radiology: all available radiological tests reviewed  < from: Xray Foot AP + Lateral + Oblique, Right (22 @ 11:29) >  Right foot. 3 views.    Mild to moderate degeneration of first MTP joint.    No bone destruction, fracture, or tracking air.    < end of copied text >    Advanced directives addressed: full resuscitation

## 2022-03-17 NOTE — PROGRESS NOTE ADULT - ASSESSMENT
Assesment: 60 y/o male see inpatient for the following   -Non- Healing Full thickness Right proximal phalanx plantar ulceration, clinically infected   - OM of R proximal phalanx.   -Diabetes Mellitus type 2  -Pain in Right Foot   -Cellulitis of Right foot  -Difficulty with ambulation    Plan:   Chart reviewed and Patient evaluated; discussed treatment and plan with Dr. Robert Ramirez  Discussed diagnosis and treatment with patient in detail at length (40 mins. duration) , including surgical intervention in both medical and lay terms. Explained to pt  wound may not resolve without surgical intervention and that hallux may serve as potential source of infection in the future. Patient demonstrated verbal understanding and is leaning towards continued conservative care.   Podiatry will monitor closely  ED 3/14/22 Bedside Incision and drainage to right hallux, pus was drained from foot, pt tolerated well. Dressed R hallux with betadine soaked gauze, dsd, kerlix.  Wound exsanguinated at bedside on 3/17 still with purulence at start of bedside washout, post intervention on bright red sanguinous blood expressed    Will continue with wet to betadine saline dressings daily.   Vascular recommendations appreciated   X-rays reviewed  official results noted above.   Official MRI results noted above.   CHRISSY/PVR findings noted above   Continue with IV antibiotics As Per ID/ED/Medicine.    Discussed importance of daily foot examinations and proper shoe gear and to importance of lower Fasting Blood Glucose levels.   Case D/W Dr. Perrin   Patient to follow up in 1 week after discharge with .   Podiatry will follow while in house. Assesment: 60 y/o male see inpatient for the following   -Non- Healing Full thickness Right proximal phalanx plantar ulceration, clinically infected   - OM of R proximal phalanx.   -Diabetes Mellitus type 2  -Pain in Right Foot   -Cellulitis of Right foot  -Difficulty with ambulation    Plan:   Chart reviewed and Patient evaluated; discussed treatment and plan with Dr. Robert Ramirez  S/P Bedside debridement and washout in ED on 3/14 of Rt Hallux  Discussed diagnosis and treatment with patient in detail at length (40 mins. duration) , including conservative vs surgical intervention in both medical and lay terms. Explained to pt  wound may not resolve without surgical intervention and that hallux may serve as potential source of infection in the future. Also expressed concern to pt about viability of distal hallux as well. Pt demonstrated verbal understanding and is leaning towards continued conservative care.   Podiatry will monitor closely  Wound exsanguinated at bedside on 3/17 still with purulence at start of bedside washout, post intervention on bright red sanguinous blood expressed    Will continue with wet to betadine saline dressings daily.   Vascular recommendations appreciated   X-rays reviewed  official results noted above.   Official MRI results noted above.   CHRISSY/PVR findings noted above   Continue with IV antibiotics As Per ID/ED/Medicine.    Discussed importance of daily foot examinations and proper shoe gear and to importance of lower Fasting Blood Glucose levels.   Case D/W Dr. Perrin   Patient to follow up in 1 week after discharge with .   Podiatry will follow while in house. Assesment: 60 y/o male see inpatient for the following   -Non- Healing Full thickness Right proximal phalanx plantar ulceration, clinically infected   - OM of R proximal phalanx.   -Diabetes Mellitus type 2  -Pain in Right Foot   -Cellulitis of Right foot  -Difficulty with ambulation    Plan:   Chart reviewed and Patient evaluated; discussed treatment and plan with Dr. Robert Ramirez  S/P Bedside debridement and washout in ED on 3/14 of Rt Hallux  Discussed diagnosis and treatment with patient in detail at length (40 mins. duration) , including conservative vs surgical intervention in both medical and lay terms. Explained to pt  wound may not resolve without surgical intervention and that hallux may serve as potential source of infection in the future. Also expressed concern to pt about viability of distal hallux as well. Pt demonstrated verbal understanding and is leaning towards continued conservative care.   Wound exsanguinated at bedside on 3/17 still with purulence at start of bedside washout, post intervention on bright red sanguinous blood expressed    Will continue with wet to betadine saline dressings daily.   Vascular recommendations appreciated   X-rays reviewed  official results noted above.   Official MRI results noted above.   CHRISSY/PVR findings noted above   Continue with IV antibiotics As Per ID/ED/Medicine.    Discussed importance of daily foot examinations and proper shoe gear and to importance of lower Fasting Blood Glucose levels.   As wound is chronic in nature and no emergent surgical intervention is warranted at this time, will plan for continued local wound care and PICC per ID  Case D/W Dr. Perrin   Patient to follow up in 1 week after discharge with .   Podiatry will follow while in house.

## 2022-03-17 NOTE — CDI QUERY NOTE - NSCDIOTHERTXTBX_GEN_ALL_CORE_HH
ED and ID consult documentation indicates that this patient has Sepsis.  The Physician's or Provider's documentation of sepsis is clinically supported by the patient's presentation, evaluation and medical management, as indicated below.  There is a need to further clarify the status of sepsis.  Please indicate status of the patient's sepsis diagnosis in your progress notes and discharge summary as : Sepsis treatment continues, or Sepsis resolving, or Sepsis resolved, or Sepsis ruled-out.     A Sepsis POA ruled in  B. Sepsis POA resolving  C. Sepsis ruled out   D. SIRS of non infectious origin with or without organ failure (i.e.  ARF/LEX, encephalopathy, acute respiratory failure)  E. Unable to determine    SUPPORTING DOCUMENTATION AND/OR CLINICAL EVIDENCE:     Vital Signs on Admission: POA , T 38.2 RR 22 WBC Count: 15.51 K/uL *H* [3.80 - 10.50] (03-14-22)    Lactate: Lactate, Blood: 1.7 mmol/L [0.7 - 2.0] (03-14-22)            Antibiotics:cefepime   IVPB 100 mL/Hr IV Intermittent (03-15-22) 100 mL/Hr IV Intermittent (03-16-22) 100 mL/Hr IV Intermittent (03-16-22) 100 mL/Hr IV Intermittent (03-17-22)  cefTRIAXone   [id number] mL/Hr IV Intermittent (03-14-22)  doxycycline hyclate Capsule 100 milliGRAM(s) Oral (03-15-22) 100 milliGRAM(s) Oral (03-16-22) 100 milliGRAM(s) Oral (03-16-22) 100 milliGRAM(s) Oral (03-17-22)  piperacillin/tazobactam IVPB... 200 mL/Hr IV Intermittent (03-14-22)  vancomycin  IVPB. 166.67 mL/Hr IV Intermittent (03-14-22)    CXR < from: Xray Chest 1 View-PORTABLE IMMEDIATE (03.14.22 @ 11:29) >    INTERPRETATION:  Right ankle, right foot, and chest. Patient is suspect   for foot infection and concern is possible osteomyelitis. Patient has   sepsis.      ED documents: ED SEPSIS – was this patient treated for sepsis? Yes.     Please clarify known or suspected organism and/or associated organ failure, if known and applicable.    ID documents on 3/16 : 1. Febrile syndrome resolving. Possible sepsis. R first metatarsal non-healing plantar ulceration. Right foot cellulitis with probable underlying acute first MTP septic arthritis s/p I and D. Renal Ca s/p nephrectomy-renal function is frail - ARF improving- s/p vancomycin and zosyn in ED- leukocytosis, elevated ESR and CRP  - wound c/s collected- MRI noted- on ceftriaxone 1g q24hr so far- on doxycycline 100 mg PO q12h and cefepime 2 gm IV q12h # 2    Podiatry 3/16 --Non- Healing Full thickness Right proximal phalanx plantar ulceration, clinically infected - OM of R proximal phalanx. -Diabetes Mellitus type 2    Vascular surgery 3/15 -EXTREMITIES:  2+ R and L DP pulses and feet well perfused appearing but for R 1st toe which is slightly cyanotic; cellulitis of R 1st toe extending to ~ MTP joint, no lymphangitic streaking; ulcer of plantar surface with necrotic tissue visible at base; no expressable purulence;1. Rt halux cellulitis and abscess:   s/p I&Df/u culturesCeftriaxone/Vanco given in the ED, R 1st toe infection but palpable R DP pulse suggesting perfusion adequate to heal whatever wound created either by debridement or toe amputation.  I believe MRI would be useful to determine if underlying osteomyelitis present; and, if it is, 1st toe amputation would be a consideration for management.  Will follow prn.     Medicine PN 3/16 :3/16: no new issues. suspicious for osteo on MRI, discussed eventual need for Picc and IV abx when ready for dc. discussed dm2 management and glycemic control upon dc     # SIRS due to cellulitis abscess/ osteomyelitis of right hallux - sepsis not present on admission and improving. - s/p bedside I&D - MRI suspicious for osteomyelitis - s/p vanco and ceftrixaone IV, started on cefepime and doxy by ID  - will need long term IV abx coverage upon DC - ID, podiatry and vascular consulted - off loading for now

## 2022-03-17 NOTE — CDI QUERY NOTE - NSCDIOTHERTXTBX2_GEN_ALL_CORE_FT
Cause and effect    Please document the relationship between the following conditions:  cellulitis right proximal phalanx  plantar ulceration abscess OM  and DM    A. Cellulitis of right phalanx plantar ulceration and abscess associated with DM and OM  B. Cellulitis of right phalanx plantar ulceration and abscess not  associated with DM and OM  C. Other please specify  D. Unable to determine    SUPPORTING DOCUMENTATION AND/OR CLINICAL EVIDENCE:    Skin: R first great toe / metatarsal plantar ulceration ~2 cm x 1cm with surrounding erythema, edema, local tenderness with scant discharge  Right great toe ulceration on medial side of toe, ecchymosis at MTP joint; erythema extending to forefoot  1. Febrile syndrome resolving. Possible sepsis. R first metatarsal non-healing plantar ulceration. Right foot cellulitis with probable underlying acute first MTP septic arthritis s/p I and D. Renal Ca   -Non- Healing Full thickness Right proximal phalanx plantar ulceration, clinically infected - OM of R proximal phalanx. -Diabetes Mellitus type 2      Medicine PN 3/16 # SIRS due to cellulitis abscess/ osteomyelitis of right hallux   - sepsis not present on admission and improving.   - s/p bedside I&D   - MRI suspicious for osteomyelitis   - s/p vanco and ceftrixaone IV, started on cefepime and doxy by ID  - will need long term IV abx coverage upon DC - ID, podiatry and vascular consulted     MR foot < from: MR Foot w/wo IV Cont, Right (03.15.22 @ 16:00) >.  Plantar great toe skin wound with adjacent edema and enhancement   concerning for cellulitis. No drainable fluid collection.2.  Small tract from the skin to the first interphalangeal joint space   raising concern for septic arthritis.3.  Abnormal marrow signal involving the first proximal distal phalanges. Given the overlying skin wound, the findings are concerning for   osteomyelitis.

## 2022-03-18 ENCOUNTER — TRANSCRIPTION ENCOUNTER (OUTPATIENT)
Age: 62
End: 2022-03-18

## 2022-03-18 VITALS
OXYGEN SATURATION: 100 % | TEMPERATURE: 98 F | DIASTOLIC BLOOD PRESSURE: 63 MMHG | SYSTOLIC BLOOD PRESSURE: 131 MMHG | RESPIRATION RATE: 17 BRPM | HEART RATE: 93 BPM

## 2022-03-18 LAB
ANION GAP SERPL CALC-SCNC: 6 MMOL/L — SIGNIFICANT CHANGE UP (ref 5–17)
BUN SERPL-MCNC: 22 MG/DL — SIGNIFICANT CHANGE UP (ref 7–23)
CALCIUM SERPL-MCNC: 9.8 MG/DL — SIGNIFICANT CHANGE UP (ref 8.5–10.1)
CHLORIDE SERPL-SCNC: 107 MMOL/L — SIGNIFICANT CHANGE UP (ref 96–108)
CO2 SERPL-SCNC: 23 MMOL/L — SIGNIFICANT CHANGE UP (ref 22–31)
CREAT SERPL-MCNC: 0.93 MG/DL — SIGNIFICANT CHANGE UP (ref 0.5–1.3)
EGFR: 94 ML/MIN/1.73M2 — SIGNIFICANT CHANGE UP
GLUCOSE SERPL-MCNC: 297 MG/DL — HIGH (ref 70–99)
HCT VFR BLD CALC: 40.9 % — SIGNIFICANT CHANGE UP (ref 39–50)
HGB BLD-MCNC: 13.3 G/DL — SIGNIFICANT CHANGE UP (ref 13–17)
MCHC RBC-ENTMCNC: 26.4 PG — LOW (ref 27–34)
MCHC RBC-ENTMCNC: 32.5 GM/DL — SIGNIFICANT CHANGE UP (ref 32–36)
MCV RBC AUTO: 81.2 FL — SIGNIFICANT CHANGE UP (ref 80–100)
PLATELET # BLD AUTO: 353 K/UL — SIGNIFICANT CHANGE UP (ref 150–400)
POTASSIUM SERPL-MCNC: 4.4 MMOL/L — SIGNIFICANT CHANGE UP (ref 3.5–5.3)
POTASSIUM SERPL-SCNC: 4.4 MMOL/L — SIGNIFICANT CHANGE UP (ref 3.5–5.3)
RBC # BLD: 5.04 M/UL — SIGNIFICANT CHANGE UP (ref 4.2–5.8)
RBC # FLD: 13.2 % — SIGNIFICANT CHANGE UP (ref 10.3–14.5)
SODIUM SERPL-SCNC: 136 MMOL/L — SIGNIFICANT CHANGE UP (ref 135–145)
WBC # BLD: 8.16 K/UL — SIGNIFICANT CHANGE UP (ref 3.8–10.5)
WBC # FLD AUTO: 8.16 K/UL — SIGNIFICANT CHANGE UP (ref 3.8–10.5)

## 2022-03-18 PROCEDURE — 71045 X-RAY EXAM CHEST 1 VIEW: CPT | Mod: 26

## 2022-03-18 PROCEDURE — 99239 HOSP IP/OBS DSCHRG MGMT >30: CPT

## 2022-03-18 RX ORDER — GLIMEPIRIDE 1 MG
1 TABLET ORAL
Qty: 0 | Refills: 0 | DISCHARGE

## 2022-03-18 RX ORDER — CEFEPIME 1 G/1
2 INJECTION, POWDER, FOR SOLUTION INTRAMUSCULAR; INTRAVENOUS
Qty: 0 | Refills: 0 | DISCHARGE
Start: 2022-03-18

## 2022-03-18 RX ORDER — INSULIN GLARGINE 100 [IU]/ML
15 INJECTION, SOLUTION SUBCUTANEOUS EVERY MORNING
Refills: 0 | Status: DISCONTINUED | OUTPATIENT
Start: 2022-03-18 | End: 2022-03-18

## 2022-03-18 RX ORDER — INSULIN GLARGINE 100 [IU]/ML
15 INJECTION, SOLUTION SUBCUTANEOUS AT BEDTIME
Refills: 0 | Status: DISCONTINUED | OUTPATIENT
Start: 2022-03-18 | End: 2022-03-18

## 2022-03-18 RX ORDER — INSULIN GLARGINE 100 [IU]/ML
40 INJECTION, SOLUTION SUBCUTANEOUS
Qty: 1200 | Refills: 0
Start: 2022-03-18 | End: 2022-04-16

## 2022-03-18 RX ORDER — ACETAMINOPHEN 500 MG
2 TABLET ORAL
Qty: 0 | Refills: 0 | DISCHARGE
Start: 2022-03-18

## 2022-03-18 RX ORDER — INSULIN DETEMIR 100/ML (3)
40 INSULIN PEN (ML) SUBCUTANEOUS
Qty: 1200 | Refills: 0
Start: 2022-03-18 | End: 2022-04-16

## 2022-03-18 RX ADMIN — Medication 650 MILLIGRAM(S): at 08:04

## 2022-03-18 RX ADMIN — Medication 100 MILLIGRAM(S): at 17:54

## 2022-03-18 RX ADMIN — INSULIN GLARGINE 15 UNIT(S): 100 INJECTION, SOLUTION SUBCUTANEOUS at 10:25

## 2022-03-18 RX ADMIN — LISINOPRIL 40 MILLIGRAM(S): 2.5 TABLET ORAL at 09:53

## 2022-03-18 RX ADMIN — CEFEPIME 100 MILLIGRAM(S): 1 INJECTION, POWDER, FOR SOLUTION INTRAMUSCULAR; INTRAVENOUS at 09:54

## 2022-03-18 RX ADMIN — Medication 100 MILLIGRAM(S): at 09:54

## 2022-03-18 RX ADMIN — Medication 3: at 20:26

## 2022-03-18 RX ADMIN — AMLODIPINE BESYLATE 5 MILLIGRAM(S): 2.5 TABLET ORAL at 09:54

## 2022-03-18 RX ADMIN — CEFEPIME 100 MILLIGRAM(S): 1 INJECTION, POWDER, FOR SOLUTION INTRAMUSCULAR; INTRAVENOUS at 17:54

## 2022-03-18 RX ADMIN — Medication 6: at 06:48

## 2022-03-18 RX ADMIN — INSULIN GLARGINE 15 UNIT(S): 100 INJECTION, SOLUTION SUBCUTANEOUS at 20:25

## 2022-03-18 RX ADMIN — Medication 10: at 11:19

## 2022-03-18 RX ADMIN — Medication 10: at 16:52

## 2022-03-18 NOTE — DISCHARGE NOTE NURSING/CASE MANAGEMENT/SOCIAL WORK - NSDCPEFALRISK_GEN_ALL_CORE
For information on Fall & Injury Prevention, visit: [email or web address] OR  [email or web address] OR  https://www.cdc.gov/steadi/patient.html

## 2022-03-18 NOTE — DISCHARGE NOTE NURSING/CASE MANAGEMENT/SOCIAL WORK - PATIENT PORTAL LINK FT
You can access the FollowMyHealth Patient Portal offered by [facility] by registering at the following website: [email or web address]. By joining [employer]’s FollowMyHealth portal, you will also be able to view your health information using other applications (apps) compatible with our system.

## 2022-03-18 NOTE — DISCHARGE NOTE PROVIDER - HOSPITAL COURSE
HPI:  · 62 yo, male h/o HTN, NIDDM, Renal CA (in remission) s/p nephrectomy,  presents with cc: right big toe pain for the last 3 days, with subjective fevers, chills, and vomiting. Patient states he developed an ulceration on his Rt halux a few months ago and he covered it with a bandaid. For the past 2 weeks his ulcer has gotten bigger and his toe has gotten more swollen,  red and painful. He finally told his wife and he came to the ED for evaluation.  In the ED podiatry performed an I&D of the halux abscess.    hospital course: pt admitted for diabetes melluits associated foot ulcer/ osteomyelitis of right distal hallux. MRI positive for osteo - patient evaled by Podiatry - had bedside I&D- with wash out and drainage.     All 10 systems reviewed and found to be negative with the exception of what has been described above.    Vital Signs Last 24 Hrs  T(C): 36.7 (17 Mar 2022 23:55), Max: 36.7 (17 Mar 2022 15:20)  T(F): 98 (17 Mar 2022 23:55), Max: 98.1 (17 Mar 2022 15:20)  HR: 82 (17 Mar 2022 23:55) (82 - 99)  BP: 124/66 (17 Mar 2022 23:55) (124/66 - 140/61)  BP(mean): 75 (17 Mar 2022 15:20) (75 - 75)  RR: 18 (17 Mar 2022 23:55) (18 - 18)  SpO2: 100% (17 Mar 2022 23:55) (99% - 100%)    LABS                         13.3   8.16  )-----------( 353      ( 18 Mar 2022 09:06 )             40.9     03-18    136  |  107  |  22  ----------------------------<  297<H>  4.4   |  23  |  0.93    Ca    9.8      18 Mar 2022 09:06      PHYSICAL EXAM:    Constitutional: NAD, awake and alert, well-developed  HEENT: PERR, EOMI, Normal Hearing, MMM  Neck: Soft and supple, No LAD, No JVD  Respiratory: Breath sounds are clear bilaterally, No wheezing, rales or rhonchi  Cardiovascular: S1 and S2, regular rate and rhythm, no Murmurs, gallops or rubs  Gastrointestinal: Bowel Sounds present, soft, nontender, nondistended, no guarding, no rebound  Extremities: No peripheral edema  Vascular: 2+ peripheral pulses  Neurological: A/O x 3, no focal deficits  Musculoskeletal: 5/5 strength b/l upper and lower extremities  Skin: No rashes. large ulceration with surrounding cellulitis and purulent drainage to hallux s/p bedside I&D       RADIOLGY     IMPRESSION:    1.  Plantar great toe skin wound with adjacent edema and enhancement   concerning for cellulitis. No drainable fluid collection.  2.  Small tract from the skin to the first interphalangeal joint space   raising concern for septic arthritis.  3.  Abnormal marrow signal involving the first proximal distal phalanges.   Given the overlying skin wound, the findings are concerning for   osteomyelitis.    --- End of Report ---        A&P     # SIRS due to cellulitis abscess/ osteomyelitis of right hallux   - sepsis not present on admission and improving.   - s/p bedside I&D   - MRI suspicious for osteomyelitis   - abscess culture gram positive cocci in pairs and chains - sensitivities pending   - s/p vanco and ceftrixaone IV, started on cefepime and doxy by ID    - will need long term IV abx coverage upon DC   - ID, podiatry and vascular consulted   - off loading for now     # DM 2   - A1C 9.8   - ISS, lantus 20 and carb controlled diet.     # HTN   - cont w/ lisinopril and amlodipine     vte ppx - lovenox 40 daily     above plan discussed with pt, bedside RN, consultants and MD Michelle    HPI:  · 60 yo, male h/o HTN, NIDDM, Renal CA (in remission) s/p nephrectomy,  presents with cc: right big toe pain for the last 3 days, with subjective fevers, chills, and vomiting. Patient states he developed an ulceration on his Rt halux a few months ago and he covered it with a bandaid. For the past 2 weeks his ulcer has gotten bigger and his toe has gotten more swollen,  red and painful. He finally told his wife and he came to the ED for evaluation.  In the ED podiatry performed an I&D of the halux abscess.    hospital course: pt admitted for diabetes melluits associated foot ulcer/ osteomyelitis of right distal hallux. MRI positive for osteo - patient evaled by Podiatry - had bedside I&D- with wash out and drainage with improvement. to wound. pt also with hyperglycemia requiring multiple adjustments in insulin - will be dc'd on Lantus 40 units HS. pt evaled by ID - Dr. Granger - placed on Cefepime and Doxy BID for 6 weeks via PICC line. Culture positive for Streptococcus constellatus    All 10 systems reviewed and found to be negative with the exception of what has been described above.    Vital Signs Last 24 Hrs  T(C): 36.7 (17 Mar 2022 23:55), Max: 36.7 (17 Mar 2022 15:20)  T(F): 98 (17 Mar 2022 23:55), Max: 98.1 (17 Mar 2022 15:20)  HR: 82 (17 Mar 2022 23:55) (82 - 99)  BP: 124/66 (17 Mar 2022 23:55) (124/66 - 140/61)  BP(mean): 75 (17 Mar 2022 15:20) (75 - 75)  RR: 18 (17 Mar 2022 23:55) (18 - 18)  SpO2: 100% (17 Mar 2022 23:55) (99% - 100%)    LABS                         13.3   8.16  )-----------( 353      ( 18 Mar 2022 09:06 )             40.9     03-18    136  |  107  |  22  ----------------------------<  297<H>  4.4   |  23  |  0.93    Ca    9.8      18 Mar 2022 09:06    Culture Results:   Few Streptococcus constellatus "Susceptibilities not performed"  Insufficient growth-culture reincubated (03.15.22 @ 18:00)      PHYSICAL EXAM:    Constitutional: NAD, awake and alert, well-developed  HEENT: PERR, EOMI, Normal Hearing, MMM  Neck: Soft and supple, No LAD, No JVD  Respiratory: Breath sounds are clear bilaterally, No wheezing, rales or rhonchi  Cardiovascular: S1 and S2, regular rate and rhythm, no Murmurs, gallops or rubs  Gastrointestinal: Bowel Sounds present, soft, nontender, nondistended, no guarding, no rebound  Extremities: No peripheral edema  Vascular: 2+ peripheral pulses  Neurological: A/O x 3, no focal deficits  Musculoskeletal: 5/5 strength b/l upper and lower extremities  Skin: No rashes. large ulceration with surrounding cellulitis and purulent drainage to hallux s/p bedside I&D       RADIOLGY     IMPRESSION:    1.  Plantar great toe skin wound with adjacent edema and enhancement   concerning for cellulitis. No drainable fluid collection.  2.  Small tract from the skin to the first interphalangeal joint space   raising concern for septic arthritis.  3.  Abnormal marrow signal involving the first proximal distal phalanges.   Given the overlying skin wound, the findings are concerning for   osteomyelitis.    --- End of Report ---      A&P     # SIRS due to cellulitis abscess/ osteomyelitis of right hallux   - sepsis not present on admission and improving.   - s/p bedside I&D   - MRI suspicious for osteomyelitis   - abscess culture gram positive cocci in pairs and chains  - strep constellatus  - s/p vanco and ceftrixaone IV, started on cefepime and doxy by ID  --- cont PO doxy 100mg  BID and Cefepime 2gms for 6 weeks   - ID, podiatry and vascular consulted out pt follow up in 2 weeks   - off loading for now     # DM 2   - A1C 9.8   - s/p ISS  - cont w/ lantus 40 units HS and carb controlled diet upon dc     # HTN   - cont w/ lisinopril and amlodipine     vte ppx - lovenox 40 daily     above plan discussed with pt, bedside RN, consultants and MD Michelle   spent 40 mins preparing dc.    HPI:  · 60 yo, male h/o HTN, NIDDM, Renal CA (in remission) s/p nephrectomy,  presents with cc: right big toe pain for the last 3 days, with subjective fevers, chills, and vomiting. Patient states he developed an ulceration on his Rt halux a few months ago and he covered it with a bandaid. For the past 2 weeks his ulcer has gotten bigger and his toe has gotten more swollen,  red and painful. He finally told his wife and he came to the ED for evaluation.  In the ED podiatry performed an I&D of the halux abscess.    hospital course: pt admitted for diabetes melluits associated foot ulcer/ osteomyelitis of right distal hallux. MRI positive for osteo - patient evaled by Podiatry - had bedside I&D- with wash out and drainage with improvement. to wound. pt also with hyperglycemia requiring multiple adjustments in insulin - will be dc'd on Lantus 40 units HS. pt evaled by ID - Dr. Granger - placed on Cefepime and Doxy BID for 6 weeks via PICC line. Culture positive for Streptococcus constellatus    All 10 systems reviewed and found to be negative with the exception of what has been described above.    Vital Signs Last 24 Hrs  T(C): 36.7 (17 Mar 2022 23:55), Max: 36.7 (17 Mar 2022 15:20)  T(F): 98 (17 Mar 2022 23:55), Max: 98.1 (17 Mar 2022 15:20)  HR: 82 (17 Mar 2022 23:55) (82 - 99)  BP: 124/66 (17 Mar 2022 23:55) (124/66 - 140/61)  BP(mean): 75 (17 Mar 2022 15:20) (75 - 75)  RR: 18 (17 Mar 2022 23:55) (18 - 18)  SpO2: 100% (17 Mar 2022 23:55) (99% - 100%)    LABS                         13.3   8.16  )-----------( 353      ( 18 Mar 2022 09:06 )             40.9     03-18    136  |  107  |  22  ----------------------------<  297<H>  4.4   |  23  |  0.93    Ca    9.8      18 Mar 2022 09:06    Culture Results:   Few Streptococcus constellatus "Susceptibilities not performed"  Insufficient growth-culture reincubated (03.15.22 @ 18:00)      PHYSICAL EXAM:    Constitutional: NAD, awake and alert, well-developed  HEENT: PERR, EOMI, Normal Hearing, MMM  Neck: Soft and supple, No LAD, No JVD  Respiratory: Breath sounds are clear bilaterally, No wheezing, rales or rhonchi  Cardiovascular: S1 and S2, regular rate and rhythm, no Murmurs, gallops or rubs  Gastrointestinal: Bowel Sounds present, soft, nontender, nondistended, no guarding, no rebound  Extremities: No peripheral edema  Vascular: 2+ peripheral pulses  Neurological: A/O x 3, no focal deficits  Musculoskeletal: 5/5 strength b/l upper and lower extremities  Skin: No rashes. large ulceration with surrounding cellulitis and purulent drainage to hallux s/p bedside I&D       RADIOLGY     IMPRESSION:    1.  Plantar great toe skin wound with adjacent edema and enhancement   concerning for cellulitis. No drainable fluid collection.  2.  Small tract from the skin to the first interphalangeal joint space   raising concern for septic arthritis.  3.  Abnormal marrow signal involving the first proximal distal phalanges.   Given the overlying skin wound, the findings are concerning for   osteomyelitis.    --- End of Report ---      A&P     # SIRS due to cellulitis abscess/ osteomyelitis of right hallux   - cellulitis and ulcer associated with diabetes mellitis and osteomyelitis    - sepsis not present on admission and improving.   - s/p bedside I&D   - MRI suspicious for osteomyelitis   - abscess culture gram positive cocci in pairs and chains  - strep constellatus  - s/p vanco and ceftrixaone IV, started on cefepime and doxy by ID  --- cont PO doxy 100mg  BID and Cefepime 2gms for 6 weeks   - ID, podiatry and vascular consulted out pt follow up in 2 weeks   - off loading for now     # DM 2   - A1C 9.8   - s/p ISS  - cont w/ lantus 40 units HS and carb controlled diet upon dc     # HTN   - cont w/ lisinopril and amlodipine     vte ppx - lovenox 40 daily     above plan discussed with pt, bedside RN, consultants and MD Michelle   spent 40 mins preparing dc.    HPI:  · 62 yo, male h/o HTN, NIDDM, Renal CA (in remission) s/p nephrectomy,  presents with cc: right big toe pain for the last 3 days, with subjective fevers, chills, and vomiting. Patient states he developed an ulceration on his Rt halux a few months ago and he covered it with a bandaid. For the past 2 weeks his ulcer has gotten bigger and his toe has gotten more swollen,  red and painful. He finally told his wife and he came to the ED for evaluation.  In the ED podiatry performed an I&D of the halux abscess.    hospital course: pt admitted for diabetes melluits associated foot ulcer/ osteomyelitis of right distal hallux. MRI positive for osteo - patient evaled by Podiatry - had bedside I&D- with wash out and drainage with improvement. to wound. pt also with hyperglycemia requiring multiple adjustments in insulin - will be dc'd on Lantus 40 units HS. pt evaled by ID - Dr. Granger - placed on Cefepime and Doxy BID for 6 weeks via PICC line. Culture positive for Streptococcus constellatus    All 10 systems reviewed and found to be negative with the exception of what has been described above.    Vital Signs Last 24 Hrs  T(C): 36.7 (17 Mar 2022 23:55), Max: 36.7 (17 Mar 2022 15:20)  T(F): 98 (17 Mar 2022 23:55), Max: 98.1 (17 Mar 2022 15:20)  HR: 82 (17 Mar 2022 23:55) (82 - 99)  BP: 124/66 (17 Mar 2022 23:55) (124/66 - 140/61)  BP(mean): 75 (17 Mar 2022 15:20) (75 - 75)  RR: 18 (17 Mar 2022 23:55) (18 - 18)  SpO2: 100% (17 Mar 2022 23:55) (99% - 100%)    LABS                         13.3   8.16  )-----------( 353      ( 18 Mar 2022 09:06 )             40.9     03-18    136  |  107  |  22  ----------------------------<  297<H>  4.4   |  23  |  0.93    Ca    9.8      18 Mar 2022 09:06    Culture Results:   Few Streptococcus constellatus "Susceptibilities not performed"  Insufficient growth-culture reincubated (03.15.22 @ 18:00)      PHYSICAL EXAM:    Constitutional: NAD, awake and alert, well-developed  HEENT: PERR, EOMI, Normal Hearing, MMM  Neck: Soft and supple, No LAD, No JVD  Respiratory: Breath sounds are clear bilaterally, No wheezing, rales or rhonchi  Cardiovascular: S1 and S2, regular rate and rhythm, no Murmurs, gallops or rubs  Gastrointestinal: Bowel Sounds present, soft, nontender, nondistended, no guarding, no rebound  Extremities: No peripheral edema  Vascular: 2+ peripheral pulses  Neurological: A/O x 3, no focal deficits  Musculoskeletal: 5/5 strength b/l upper and lower extremities  Skin: No rashes. large ulceration with surrounding cellulitis and purulent drainage to hallux s/p bedside I&D       RADIOLGY     IMPRESSION:    1.  Plantar great toe skin wound with adjacent edema and enhancement   concerning for cellulitis. No drainable fluid collection.  2.  Small tract from the skin to the first interphalangeal joint space   raising concern for septic arthritis.  3.  Abnormal marrow signal involving the first proximal distal phalanges.   Given the overlying skin wound, the findings are concerning for   osteomyelitis.    --- End of Report ---      A&P     # Sepsis due to cellulitis abscess/ osteomyelitis of right hallux   - cellulitis and ulcer associated with diabetes mellitis and osteomyelitis    - sepsis on admission and improving.   - s/p bedside I&D   - MRI suspicious for osteomyelitis   - abscess culture gram positive cocci in pairs and chains  - strep constellatus  - s/p vanco and ceftrixaone IV, started on cefepime and doxy by ID  --- cont PO doxy 100mg  BID and Cefepime 2gms for 6 weeks   - ID, podiatry and vascular consulted out pt follow up in 2 weeks   - off loading for now     # DM 2   - A1C 9.8   - s/p ISS  - cont w/ lantus 40 units HS and carb controlled diet upon dc     # HTN   - cont w/ lisinopril and amlodipine     vte ppx - lovenox 40 daily     above plan discussed with pt, bedside RN, consultants and MD Michelle   spent 40 mins preparing dc.    HPI:  · 60 yo, male h/o HTN, NIDDM, Renal CA (in remission) s/p nephrectomy,  presents with cc: right big toe pain for the last 3 days, with subjective fevers, chills, and vomiting. Patient states he developed an ulceration on his Rt halux a few months ago and he covered it with a bandaid. For the past 2 weeks his ulcer has gotten bigger and his toe has gotten more swollen,  red and painful. He finally told his wife and he came to the ED for evaluation.  In the ED podiatry performed an I&D of the halux abscess.    hospital course: pt admitted for diabetes melluits associated foot ulcer/ osteomyelitis of right distal hallux. MRI positive for osteo - patient evaled by Podiatry - had bedside I&D- with wash out and drainage with improvement. to wound. pt also with hyperglycemia requiring multiple adjustments in insulin - will be dc'd on Lantus 40 units HS. pt evaled by ID - Dr. Granger - placed on Cefepime and Doxy BID for 6 weeks via PICC line. Culture positive for Streptococcus constellatus    All 10 systems reviewed and found to be negative with the exception of what has been described above.    Vital Signs Last 24 Hrs  T(C): 36.7 (17 Mar 2022 23:55), Max: 36.7 (17 Mar 2022 15:20)  T(F): 98 (17 Mar 2022 23:55), Max: 98.1 (17 Mar 2022 15:20)  HR: 82 (17 Mar 2022 23:55) (82 - 99)  BP: 124/66 (17 Mar 2022 23:55) (124/66 - 140/61)  BP(mean): 75 (17 Mar 2022 15:20) (75 - 75)  RR: 18 (17 Mar 2022 23:55) (18 - 18)  SpO2: 100% (17 Mar 2022 23:55) (99% - 100%)    LABS                         13.3   8.16  )-----------( 353      ( 18 Mar 2022 09:06 )             40.9     03-18    136  |  107  |  22  ----------------------------<  297<H>  4.4   |  23  |  0.93    Ca    9.8      18 Mar 2022 09:06    Culture Results:   Few Streptococcus constellatus "Susceptibilities not performed"  Insufficient growth-culture reincubated (03.15.22 @ 18:00)      PHYSICAL EXAM:    Constitutional: NAD, awake and alert, well-developed  HEENT: PERR, EOMI, Normal Hearing, MMM  Neck: Soft and supple, No LAD, No JVD  Respiratory: Breath sounds are clear bilaterally, No wheezing, rales or rhonchi  Cardiovascular: S1 and S2, regular rate and rhythm, no Murmurs, gallops or rubs  Gastrointestinal: Bowel Sounds present, soft, nontender, nondistended, no guarding, no rebound  Extremities: No peripheral edema  Vascular: 2+ peripheral pulses  Neurological: A/O x 3, no focal deficits  Musculoskeletal: 5/5 strength b/l upper and lower extremities  Skin: No rashes. large ulceration with surrounding cellulitis and purulent drainage to hallux s/p bedside I&D       RADIOLGY     IMPRESSION:    1.  Plantar great toe skin wound with adjacent edema and enhancement   concerning for cellulitis. No drainable fluid collection.  2.  Small tract from the skin to the first interphalangeal joint space   raising concern for septic arthritis.  3.  Abnormal marrow signal involving the first proximal distal phalanges.   Given the overlying skin wound, the findings are concerning for   osteomyelitis.    --- End of Report ---      A&P     # Sepsis due to cellulitis abscess/ osteomyelitis of right hallux   - cellulitis and ulcer associated with diabetes mellitis and osteomyelitis    - sepsis on admission and improving.   - s/p bedside I&D   - MRI suspicious for osteomyelitis   - abscess culture gram positive cocci in pairs and chains  - strep constellatus  - s/p vanco and ceftrixaone IV, started on cefepime and doxy by ID  --- cont PO doxy 100mg  BID and Cefepime 2gms for 6 weeks   - ID, podiatry and vascular consulted out pt follow up in 2 weeks   - off loading for now     # DM 2   - A1C 9.8   - s/p ISS  - cont w/ lantus 40 units HS and carb controlled diet upon dc     # HTN   - cont w/ lisinopril and amlodipine     vte ppx - lovenox 40 daily     above plan discussed with pt, bedside RN, consultants and MD Michelle   spent 40 mins preparing dc.   Attending note: Patient seen and examined with NP Alex  Case reviewed and discussed with her and necessary changes were made  Agree with her assessment and d/c plan.

## 2022-03-18 NOTE — PROGRESS NOTE ADULT - ASSESSMENT
62 y/o male with h/o HTN, DM2, Renal CA (in remission) s/p L nephrectomy was admitted on 3/14 for right first toe pain, erythema x 4 days, with subjective fever and 1 episode vomiting. Patient states he developed an ulceration on his Rt first toe during the fall 2021, unable to recall what caused it and didn't realize it was there. Performed local care with neosporin but didn't seek medical attention for it. For the past 1-2 weeks his toe has gotten more swollen, red and uncomfortable. Reports hx of left 5th metatarsal infection d/t skin abrasion years ago, tx with abx and no sequalae.  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 In the ED, pt received IV vancomycin 1250mg x 1 and IV zosyn 3.375mg x 1, then ceftriaxone.     1. R first metatarsal non-healing plantar ulceration. Right foot cellulitis with probable underlying acute first MTP septic arthritis s/p I and D with strep constellatus. Renal Ca s/p nephrectomy  -renal function is frail - ARF improving  - s/p vancomycin and zosyn in ED  - leukocytosis, elevated ESR and CRP  - wound c/s collected  - MRI noted  - s/p ceftriaxone 1g q24hr so far  - on doxycycline 100 mg PO q12h and cefepime 2 gm IV q12h # 4  -tolerating abx well so far; no side effects noted  -local wound care per podiatry   - continue with IV abx coverage  -will need longer term IV abx coverage for 6 weeks  -home IV abx setup in progress; case reviewed with case management; orders reviewed and called in to pharmacist with infusion company; awaiting insurance approval  -weekly labs  -plan for PICC line when ready for discharge  - cultures noted  - podiatry consult appreciated  -monitor temps  -f/u CBC  -supportive care  2. Other issues:   -care per medicine

## 2022-03-18 NOTE — PROGRESS NOTE ADULT - ASSESSMENT
Assesment: 62 y/o male see inpatient for the following   -Non- Healing Full thickness Right proximal phalanx plantar ulceration, clinically infected   - OM of R proximal phalanx.   -Diabetes Mellitus type 2  -Pain in Right Foot   -Cellulitis of Right foot  -Difficulty with ambulation    Plan:   Chart reviewed and Patient evaluated; discussed treatment and plan with Dr. Robert Ramirez  S/P Bedside debridement and washout in ED on 3/14 of Rt Hallux  Discussed diagnosis and treatment with patient in detail, including conservative vs surgical intervention in both medical and lay terms. Explained to pt  wound may not resolve without surgical intervention and that hallux may serve as potential source of infection in the future. Also expressed concern to pt about viability of distal hallux as well. Pt demonstrated verbal understanding and is leaning towards continued conservative care.   Wound exsanguinated at bedside on 3/18 still with purulence at start of bedside washout, post intervention - bright red sanguinous blood expressed    Will continue with wet to betadine saline dressings daily.   Vascular recommendations appreciated   X-rays reviewed  official results noted above.   Official MRI results noted above.   CHRISSY/PVR findings noted above   Rt Foot wound cultures findings noted above   Continue with IV antibiotics As Per ID/ED/Medicine.    Discussed importance of daily foot examinations and proper shoe gear and to importance of lower Fasting Blood Glucose levels.   As wound is chronic in nature and no emergent surgical intervention is warranted at this time, plan remains  for continued local wound care and PICC per ID but Rec. weekend observation prior to d/c.  Case D/W Dr. Perrin   Patient to follow up in 1 week after discharge with .   Podiatry will follow while in house.

## 2022-03-18 NOTE — DIETITIAN INITIAL EVALUATION ADULT. - ORAL INTAKE PTA/DIET HISTORY
Pt reports good rosa/po intake PTA; follows diabetic diet until dinner, where wife prepares food and has sugary snacks, etc. nonadherent to diet. Drinks Glucerna one/day sometimes. Works as , lives w/wife and dtr, grandson.  "I'm an addict with sugar, ETOH".

## 2022-03-18 NOTE — DISCHARGE NOTE PROVIDER - NSDCHHHOMEBOUND_GEN_ALL_CORE
Chest pain/weakness during/after ambulation   greater than 20 feet/Fall risk/Pain greater than 7 on scale of 10 on ambulation

## 2022-03-18 NOTE — DIETITIAN INITIAL EVALUATION ADULT. - ADD RECOMMEND
1. Continue current diet rx 2. Add thiamine, folic acid, MVI w/ minerals daily to ensure 100% RDA met 3. Monitor bowel movements, if no BM for >3 days, consider implementing bowel regimen. RDN will continue to monitor PO intake, labs, hydration, and wt prn.

## 2022-03-18 NOTE — DISCHARGE NOTE PROVIDER - NSDCCPTREATMENT_GEN_ALL_CORE_FT
PRINCIPAL PROCEDURE  Procedure: Placement, PICC, with image guidance  Findings and Treatment: A midline catheter can be kept in place for up to 30 days. You will need to care for the catheter, and for the skin around the catheter site. Proper care is important to prevent damage to the catheter, and to prevent infections.   What can I do to prevent an infection?   Wash your hands often.   Limit contact with the catheter.   Keep the area covered and dry  How do I change and care for the dressing and site? Change the dressing as instructed by your IV care team   Monitor for the following signs/symptoms and contact your primary care team if they occur or go to the Emergency Room if symptoms are severe   You feel lightheaded, short of breath, or have chest pain.  You have trouble breathing.  You cough up blood.  Blood soaks through your bandage.  Your arm or leg feels warm, tender, and painful. It may look swollen and red.  You have trouble moving your arm.  Your catheter falls out.  You have a fever or swelling, redness, pain, or pus where the catheter was inserted.  You see a tear in the tubing of your catheter.  You see fluid leaking from the insertion site.  You have questions or concerns about your condition or care.

## 2022-03-18 NOTE — ADVANCED PRACTICE NURSE CONSULT - ASSESSMENT
Patient is awake and alert. PICC insertion along with risks, benefits, possible complications and infection prevention explained to pt who verbalized understanding. All questions addressed and written signed consent placed on chart. Time out along with hand washing by 2 RN's done. Right arm cleansed with CHG. Patient draped with sterile drape in usual fashion. Lidocaine 1%, 2ml given intradermally to right arm marked site. Using strict sterile technique under ultra sound guidance, placed Navilyst Xcela with PASV technology 4F single lumen PICC (lot #[id number]) cut to 45cm into right basilic vein. Brisk blood return and flushed with 30ml NS. Minimal blood loss and pt tolerated procedure well. All sharps accounted for. Dressing and end cap in place. Xray done and report given to district RN.

## 2022-03-18 NOTE — DIETITIAN INITIAL EVALUATION ADULT. - PERTINENT MEDS FT
MEDICATIONS  (STANDING):  amLODIPine   Tablet 5 milliGRAM(s) Oral daily  cefepime   IVPB 2000 milliGRAM(s) IV Intermittent every 12 hours  dextrose 40% Gel 15 Gram(s) Oral once  dextrose 5%. 1000 milliLiter(s) (50 mL/Hr) IV Continuous <Continuous>  dextrose 50% Injectable 25 Gram(s) IV Push once  doxycycline hyclate Capsule 100 milliGRAM(s) Oral every 12 hours  enoxaparin Injectable 40 milliGRAM(s) SubCutaneous every 24 hours  glucagon  Injectable 1 milliGRAM(s) IntraMuscular once  influenza   Vaccine 0.5 milliLiter(s) IntraMuscular once  insulin glargine Injectable (LANTUS) 15 Unit(s) SubCutaneous every morning  insulin glargine Injectable (LANTUS) 15 Unit(s) SubCutaneous at bedtime  insulin lispro (ADMELOG) corrective regimen sliding scale   SubCutaneous at bedtime  insulin lispro (ADMELOG) corrective regimen sliding scale   SubCutaneous three times a day before meals  lisinopril 40 milliGRAM(s) Oral daily    MEDICATIONS  (PRN):  acetaminophen     Tablet .. 650 milliGRAM(s) Oral every 6 hours PRN Mild Pain (1 - 3)  aluminum hydroxide/magnesium hydroxide/simethicone Suspension 30 milliLiter(s) Oral every 4 hours PRN Dyspepsia  melatonin 3 milliGRAM(s) Oral at bedtime PRN Insomnia  ondansetron Injectable 4 milliGRAM(s) IV Push every 8 hours PRN Nausea and/or Vomiting

## 2022-03-18 NOTE — DISCHARGE NOTE PROVIDER - NSDCCPCAREPLAN_GEN_ALL_CORE_FT
PRINCIPAL DISCHARGE DIAGNOSIS  Diagnosis: Acute osteomyelitis  Assessment and Plan of Treatment: osteodc       PRINCIPAL DISCHARGE DIAGNOSIS  Diagnosis: Acute osteomyelitis  Assessment and Plan of Treatment: Assessment and Plan of Treatment:   Osteomyelitis is a severe bone infection. It can develop in any bone, but often involves the long bones, such as your arm and leg bones, or the bones of the spine. Osteomyelitis is caused by different types of germs, such as bacteria or a fungus.  Monitor for the following signs/symptoms and contacy your primary care provider or go to the ER if they are severe.    You have severe pain Your bone breaks .You are bleeding from your wound  You have increased swelling, pain, or redness of your infected area.  You have new drainage or an odor from your wound.  Antibiotics help treat or prevent a bacterial infection.  Wound care: Care for your wound as directed. Carefully wash the wound with soap and water. Dry the area and put on new, clean bandages as directed. Change your bandages when they get wet or dirty.  Poor nutrition  What are the signs and symptoms of osteomyelitis?   Trouble moving or putting weight on your limb  Pain, redness, and swelling  Bruising or discoloration  Fever  Pus coming from a wound or sore  Night sweats  A bone that looks out of place  Continue antibiotics for 6 weeks. Doxycycline 2 times a day for 6 weeks by mouth and Cefepime twice a day for 6 weeks via PICC line   Surgery may be needed to drain or remove the infected, damaged, or dead bone tissue. Ask your healthcare provider for more information about surgery.

## 2022-03-18 NOTE — DISCHARGE NOTE PROVIDER - CARE PROVIDERS DIRECT ADDRESSES
[email or web address]

## 2022-03-18 NOTE — CHART NOTE - NSCHARTNOTEFT_GEN_A_CORE
personally reviewed chest xray   - xray confirmed placement   - distal tip of PICC line visualized in svc  - positioned well   - no evidence of pnuemothorax  - okay for use

## 2022-03-18 NOTE — PROGRESS NOTE ADULT - SUBJECTIVE AND OBJECTIVE BOX
Date of service: 22 @ 10:07    Lying in bed in NAD  Right foot feels less tender  No fever    ROS: no fever or chills; denies dizziness, no HA, no SOB or cough, no abdominal pain, no diarrhea or constipation; no dysuria    MEDICATIONS  (STANDING):  amLODIPine   Tablet 5 milliGRAM(s) Oral daily  cefepime   IVPB 2000 milliGRAM(s) IV Intermittent every 12 hours  dextrose 40% Gel 15 Gram(s) Oral once  dextrose 5%. 1000 milliLiter(s) (50 mL/Hr) IV Continuous <Continuous>  dextrose 50% Injectable 25 Gram(s) IV Push once  doxycycline hyclate Capsule 100 milliGRAM(s) Oral every 12 hours  enoxaparin Injectable 40 milliGRAM(s) SubCutaneous every 24 hours  glucagon  Injectable 1 milliGRAM(s) IntraMuscular once  influenza   Vaccine 0.5 milliLiter(s) IntraMuscular once  insulin glargine Injectable (LANTUS) 15 Unit(s) SubCutaneous every morning  insulin glargine Injectable (LANTUS) 15 Unit(s) SubCutaneous at bedtime  insulin lispro (ADMELOG) corrective regimen sliding scale   SubCutaneous at bedtime  insulin lispro (ADMELOG) corrective regimen sliding scale   SubCutaneous three times a day before meals  lisinopril 40 milliGRAM(s) Oral daily    Vital Signs Last 24 Hrs  T(C): 36.7 (17 Mar 2022 23:55), Max: 36.7 (17 Mar 2022 15:20)  T(F): 98 (17 Mar 2022 23:55), Max: 98.1 (17 Mar 2022 15:20)  HR: 82 (17 Mar 2022 23:55) (82 - 99)  BP: 124/66 (17 Mar 2022 23:55) (124/66 - 140/61)  BP(mean): 75 (17 Mar 2022 15:20) (75 - 75)  RR: 18 (17 Mar 2022 23:55) (18 - 18)  SpO2: 100% (17 Mar 2022 23:55) (99% - 100%)     Physical exam:    Constitutional: laying in bed, alert, pleasant, NAD  HEENT: NC/AT, EOMI, PERRLA, conjunctivae clear;   Neck: supple; thyroid not palpable  Respiratory: respiratory effort normal; clear to auscultation  Cardiovascular: S1S2 regular, no murmurs  Abdomen: soft, not tender, not distended, positive BS;   Genitourinary: no suprapubic tenderness  Lymphatic: no LN palpable  Musculoskeletal: no crepitus, no muscle tenderness, no joint tenderness  Extremities: 1+ pitting edema of R foot, unable to assess pulses, cap refill <2sec b/l. R first metatarsal cool to touch.   Neurological/ Psychiatric: AxOx3, Judgement and insight normal;  moving all extremities  Skin: R first great toe / metatarsal plantar ulceration ~2 cm x 1cm with surrounding erythema, edema, local tenderness with scant discharge  Right great toe ulceration on medial side of toe, ecchymosis at MTP joint; erythema extending to forefoot    Labs: reviewed                        11.9   7.59  )-----------( 267      ( 16 Mar 2022 07:21 )             35.4     03-16    137  |  109<H>  |  17  ----------------------------<  240<H>  4.2   |  20<L>  |  0.82    Ca    9.2      16 Mar 2022 07:21    C-Reactive Protein, Serum: 189 mg/L (- @ 10:21)                        14.0   15.51 )-----------( 287      ( 14 Mar 2022 10:21 )             41.1     03-14    130<L>  |  100  |  26<H>  ----------------------------<  421<H>  4.8   |  23  |  1.30    Ca    9.4      14 Mar 2022 10:21    TPro  8.4<H>  /  Alb  3.3  /  TBili  1.0  /  DBili  x   /  AST  14<L>  /  ALT  28  /  AlkPhos  80  03-14     LIVER FUNCTIONS - ( 14 Mar 2022 10:21 )  Alb: 3.3 g/dL / Pro: 8.4 gm/dL / ALK PHOS: 80 U/L / ALT: 28 U/L / AST: 14 U/L / GGT: x           Sedimentation Rate, Erythrocyte (22 @ 10:21)    Sedimentation Rate, Erythrocyte: 41 mm/hr    C-Reactive Protein, Serum (22 @ 10:21)    C-Reactive Protein, Serum: 189 mg/L    Urinalysis Basic - ( 14 Mar 2022 20:45 )    Color: Yellow / Appearance: Clear / S.015 / pH: x  Gluc: x / Ketone: Trace  / Bili: Negative / Urobili: 4   Blood: x / Protein: 30 mg/dL / Nitrite: Negative   Leuk Esterase: Negative / RBC: 3-5 /HPF / WBC 3-5   Sq Epi: x / Non Sq Epi: Few / Bacteria: Few      Culture - Abscess with Gram Stain (collected 15 Mar 2022 18:00)  Source: .Abscess Right Foot wound  Preliminary Report (17 Mar 2022 15:52):    Few Streptococcus constellatus "Susceptibilities not performed"    Insufficient growth-culture reincubated    Culture - Urine (collected 14 Mar 2022 20:45)  Source: Clean Catch Clean Catch (Midstream)  Final Report (16 Mar 2022 17:57):    No growth    Culture - Blood (collected 14 Mar 2022 10:21)  Source: .Blood Blood-Peripheral  Preliminary Report (15 Mar 2022 15:01):    No growth to date.    Culture - Blood (collected 14 Mar 2022 10:21)  Source: .Blood Blood-Peripheral  Preliminary Report (15 Mar 2022 15:01):    No growth to date.    MRI results noted    Radiology: all available radiological tests reviewed  < from: Xray Foot AP + Lateral + Oblique, Right (22 @ 11:29) >  Right foot. 3 views.  Mild to moderate degeneration of first MTP joint.  No bone destruction, fracture, or tracking air.  < end of copied text >    Advanced directives addressed: full resuscitation

## 2022-03-18 NOTE — DISCHARGE NOTE PROVIDER - NSDCMRMEDTOKEN_GEN_ALL_CORE_FT
Alogliptin 25 mg oral tablet: 1 tab(s) orally once a day  amLODIPine 5 mg oral tablet: 1 tab(s) orally once a day  glimepiride 4 mg oral tablet: 1 tab(s) orally 2 times a day  lisinopril 40 mg oral tablet: 1 tab(s) orally once a day  metFORMIN 500 mg oral tablet, extended release: 2 tab(s) orally 2 times a day   acetaminophen 325 mg oral tablet: 2 tab(s) orally every 6 hours, As needed, Mild Pain (1 - 3)  Alogliptin 25 mg oral tablet: 1 tab(s) orally once a day  amLODIPine 5 mg oral tablet: 1 tab(s) orally once a day  cefepime 2 g intravenous injection: 2 gram(s) intravenous every 12 hours  doxycycline monohydrate 100 mg oral capsule: 1 cap(s) orally every 12 hours  Lantus 100 units/mL subcutaneous solution: 40 unit(s) subcutaneous once a day (at bedtime)   lisinopril 40 mg oral tablet: 1 tab(s) orally once a day  metFORMIN 500 mg oral tablet, extended release: 2 tab(s) orally 2 times a day   acetaminophen 325 mg oral tablet: 2 tab(s) orally every 6 hours, As needed, Mild Pain (1 - 3)  Alogliptin 25 mg oral tablet: 1 tab(s) orally once a day  amLODIPine 5 mg oral tablet: 1 tab(s) orally once a day  cefepime 2 g intravenous injection: 2 gram(s) intravenous every 12 hours  doxycycline monohydrate 100 mg oral capsule: 1 cap(s) orally every 12 hours  Insulin Pen Needles, 4mm: 1 application subcutaneously 4 times a day. ** Use with insulin pen **   Lantus 100 units/mL subcutaneous solution: 40 unit(s) subcutaneous once a day (at bedtime)   lisinopril 40 mg oral tablet: 1 tab(s) orally once a day  metFORMIN 500 mg oral tablet, extended release: 2 tab(s) orally 2 times a day  U-100 Insulin Syringe, 1/2 mL: 1 application subcutaneously 2 times a day ** 1/2 mL holds up to 50 units of insulin **

## 2022-03-18 NOTE — PROGRESS NOTE ADULT - PROVIDER SPECIALTY LIST ADULT
Hospitalist
Infectious Disease
Infectious Disease
Podiatry
Hospitalist
Infectious Disease
Podiatry
Hospitalist

## 2022-03-18 NOTE — DISCHARGE NOTE PROVIDER - CARE PROVIDER_API CALL
Problem: OCCUPATIONAL THERAPY ADULT  Goal: Performs self-care activities at highest level of function for planned discharge setting  See evaluation for individualized goals  Description: Treatment Interventions: ADL retraining, Functional transfer training, UE strengthening/ROM, Patient/family training, Energy conservation, Activityengagement, Endurance training          See flowsheet documentation for full assessment, interventions and recommendations  Note: Limitation: Decreased ADL status, Decreased UE strength, Decreased Safe judgement during ADL, Decreased endurance, Decreased self-care trans, Decreased high-level ADLs     Assessment:  Pt is a 81 y o  female seen for OT evaluation s/p admit to [facility] on 5/15/2021 w/ PRASHANT (acute kidney injury) ([facility] [address] )  Comorbidities affecting pt's functional performance at time of assessment include: HTN and Cardiac disease, CVA, Arthritis, Hyperlipidemia, A-fib, Peripheral neuropathy, DDD, Carpal tunnel syndrome  Personal factors affecting pt at time of IE include:steps to enter environment, difficulty performing ADLS, difficulty performing IADLS , limited insight into deficits, decreased initiation and engagement  and health management   Prior to admission, pt was (I) with ADLs and (A) for IADLs  Upon evaluation: Pt requires min (A) to max (A) for ADLs and functional mobility with RW 2* the following deficits impacting occupational performance: weakness, decreased strength, decreased balance, decreased tolerance, impaired initiation, impaired sequencing, impaired problem solving, decreased safety awareness and increased pain  Pt to benefit from continued skilled OT tx while in the hospital to address deficits as defined above and maximize level of functional independence w ADL's and functional mobility   Occupational Performance areas to address include: grooming, bathing/shower, toilet hygiene, dressing, health maintenance, functional mobility, community mobility and clothing management  The patient's raw score on the AM-PAC Daily Activity inpatient short form is 17, standardized score is 37 26, less than 39 4  Patients at this level are likely to benefit from discharge to post-acute rehabilitation services  Please refer to the recommendation of the Occupational Therapist for safe discharge planning        OT Discharge Recommendation: Post acute rehabilitation services Robert Perrin (DO)  Orthopaedic Surgery  [address] [place] [address]  Phone: [phone]  Fax: [phone]  Follow Up Time:     Camron Granger  INFECTIOUS DISEASE  [address], [address] [address]  Phone: [phone]  Fax: [phone]  Follow Up Time:

## 2022-03-18 NOTE — DIETITIAN INITIAL EVALUATION ADULT. - OTHER INFO
62 yo, male h/o HTN, NIDDM, Renal CA (in remission) s/p nephrectomy,  presents with cc: right big toe pain for the last 3 days, with subjective fevers, chills, and vomiting. Patient states he developed an ulceration on his Rt halux a few months ago and he covered it with a bandaid. For the past 2 weeks his ulcer has gotten bigger and his toe has gotten more swollen,  red and painful. He finally told his wife and he came to the ED for evaluation. In the ED podiatry performed an I&D of the halux abscess.    Pt with good rosa/po intake.  UBW: 240# then intentional wt loss 2/2 DM dx, admit wt: 197#, RD obtained bedscale wt today 191#. IBW: 172#. NFPE reveals mid/mod muscle/fat wasting most likely d/t ETOH abuse, smoking. Pt on Consistent CHO diet, rec continue.  Diet education provided.  3/17 A1C 9.8,  elev glu above, receiving RISS, most likely pt will benefit from insulin at home. See below recommendations.

## 2022-03-18 NOTE — PROGRESS NOTE ADULT - SUBJECTIVE AND OBJECTIVE BOX
Date of Service: 3/18/22  Chief Complaint : 62 yo m, Hx: HTN, NIDDM, Renal CA (in remission) - presents with cc: right big toe pain for the last 3 days, with subjective fevers, chills, and vomiting. Denies any objective fevers, cough, CP, SOB, abdominal pain, diarrhea, bloody stools, dysuric symptoms. Reports wound was present there since the fall 0.5 cm x 0.5 cm - but this weekend started to get swollen, red, and more painful. Has not sought care for wound, and was locally treating it, and does not follow with a podiatrist outpatient.  Patient is accompanied by wife and both are very pleasant. Note patient is septic in Emergency room.   3/18/22: Patient  seen by podiatry for follow up of Right great toe infection. Pt reports some overnight pain to foot that improved slightly with tylenol. Denies any additional pedal complaints.  Denies any N/V/D/C/CP/SOB.    REVIEW OF SYSTEMS:  CONSTITUTIONAL: No weakness, + fevers   EYES/ENT: No visual changes;  No vertigo or throat pain   NECK: No pain or stiffness  RESPIRATORY: No cough, wheezing, hemoptysis; No shortness of breath  CARDIOVASCULAR: No chest pain or palpitations  GASTROINTESTINAL: No abdominal or epigastric pain. No nausea, vomiting, or hematemesis; No diarrhea or constipation. No melena or hematochezia.  GENITOURINARY: No dysuria, frequency or hematuria  NEUROLOGICAL: No numbness or weakness  SKIN: Wound to R plantar hallux.   All other review of systems is negative unless indicated above    PMH:  Malignant Neoplasm of Kidney  Diabetes  Adrenal Adenoma  Anxiety  HTN (hypertension)    PSH:  History of Cholecystectomy  Basal Cell Carcinoma of Nose Excision  H/O partial nephrectomy    Allergies:  No Known Allergies    MEDICATIONS  (STANDING):  amLODIPine   Tablet 5 milliGRAM(s) Oral daily  cefepime   IVPB 2000 milliGRAM(s) IV Intermittent every 12 hours  dextrose 40% Gel 15 Gram(s) Oral once  dextrose 5%. 1000 milliLiter(s) (50 mL/Hr) IV Continuous <Continuous>  dextrose 50% Injectable 25 Gram(s) IV Push once  doxycycline hyclate Capsule 100 milliGRAM(s) Oral every 12 hours  glucagon  Injectable 1 milliGRAM(s) IntraMuscular once  influenza   Vaccine 0.5 milliLiter(s) IntraMuscular once  insulin lispro (ADMELOG) corrective regimen sliding scale   SubCutaneous three times a day before meals  lisinopril 40 milliGRAM(s) Oral daily    MEDICATIONS  (PRN):  acetaminophen     Tablet .. 650 milliGRAM(s) Oral every 6 hours PRN Mild Pain (1 - 3)  aluminum hydroxide/magnesium hydroxide/simethicone Suspension 30 milliLiter(s) Oral every 4 hours PRN Dyspepsia  melatonin 3 milliGRAM(s) Oral at bedtime PRN Insomnia  ondansetron Injectable 4 milliGRAM(s) IV Push every 8 hours PRN Nausea and/or Vomiting    Vitals  Vital Signs Last 24 Hrs  T(C): 36.7 (17 Mar 2022 23:55), Max: 36.7 (17 Mar 2022 15:20)  T(F): 98 (17 Mar 2022 23:55), Max: 98.1 (17 Mar 2022 15:20)  HR: 82 (17 Mar 2022 23:55) (82 - 99)  BP: 124/66 (17 Mar 2022 23:55) (124/66 - 140/61)  BP(mean): 75 (17 Mar 2022 15:20) (75 - 75)  RR: 18 (17 Mar 2022 23:55) (18 - 18)  SpO2: 100% (17 Mar 2022 23:55) (99% - 100%)    Physical Exam:   Constitutiona: NAD, alert;  Derm:    R hallux 0.5 x 0.5 cm, plantar ulceration, probe to bone +, malodor -, no pain on palpation, + swelling, + erythema/cellulitic changes, + purulent drainage, + tunneling/tracking.  Dorsal cellulitis improving localized to Rt great toe and first interspace.  Vascular: Dorsalis Pedis and Posterior Tibial pulses palpable 1/4.  Capillary re-fill time less then 3 seconds digits 2-5 on Rt and greater than 5 sconds to hallux on Rt.    Cyanotic appearing R distal hallux.   Neuro: Protective sensation diminished to the level of the digits bilateral.  MSK: Muscle strength 5/5 in all major muscle groups of Right lower extremity. 5/5 in all muscle groups of LLE;  .        Labs:                                           11.9   7.59  )-----------( 267      ( 16 Mar 2022 07:21 )             35.4     03-16    137  |  109<H>  |  17  ----------------------------<  240<H>  4.2   |  20<L>  |  0.82    Ca    9.2      16 Mar 2022 07:21    TPro  8.4<H>  /  Alb  3.3  /  TBili  1.0  /  DBili  x   /  AST  14<L>  /  ALT  28  /  AlkPhos  80  03-14      Culture - Abscess with Gram Stain (03.15.22 @ 18:00)   Specimen Source: .Abscess Right Foot wound   Culture Results:   Few Streptococcus constellatus "Susceptibilities not performed"   Insufficient growth-culture reincubated        Rad/Ekg  < from: Xray Foot AP + Lateral + Oblique, Right (03.14.22 @ 11:29) >  IMPRESSION: Chest unremarkable. Arterial calcifications of the foot. No   acute finding.    < end of copied text >    < from: MR Foot w/wo IV Cont, Right (03.15.22 @ 16:00) >  IMPRESSION:    1.  Plantar great toe skin wound with adjacent edema and enhancement   concerning for cellulitis. No drainable fluid collection.  2.  Small tract from the skin to the first interphalangeal joint space   raising concern for septic arthritis.  3.  Abnormal marrow signal involving the first proximal distal phalanges.   Given the overlying skin wound, the findings are concerning for   osteomyelitis.    --- End of Report ---    < end of copied text >    < from: VA Physiol Extremity Lower 3+ Level, BI (03.16.22 @ 13:28) >  FINDINGS: There are biphasic pulse volume recordings bilaterally. Vessels   are noncompressible in the upper right thigh, calves and ankles.   Therefore, ABIs could not be calculated.    IMPRESSION: ABIs could not be calculated.    Multiple calcified, noncompressible vessels as described above.    < end of copied text >

## 2022-03-18 NOTE — PHARMACOTHERAPY INTERVENTION NOTE - COMMENTS
Pt with hyperglycemia, A1C=9.8%, to be discharged on new start insulin Lantus 40 units SQ QHS. Pt takes metformin and alogliptin at home in addition to glimepiride. S/w NP Claudia Johnson, will discontinue glimepiride and continue alogliptin and metformin in addition to Lantus at home.

## 2022-03-18 NOTE — DIETITIAN INITIAL EVALUATION ADULT. - PERTINENT LABORATORY DATA
03-18    136  |  107  |  22  ----------------------------<  297<H>  4.4   |  23  |  0.93    Ca    9.8      18 Mar 2022 09:06    BMI: BMI (kg/m2): 27.5 (03-14-22 @ 13:23)  HbA1c: A1C with Estimated Average Glucose Result: 9.8 % (03-15-22 @ 08:26)    Glucose: POCT Blood Glucose.: 361 mg/dL (03-18-22 @ 11:16)  CAPILLARY BLOOD GLUCOSE      POCT Blood Glucose.: 361 mg/dL (18 Mar 2022 11:16)  POCT Blood Glucose.: 293 mg/dL (18 Mar 2022 06:47)  POCT Blood Glucose.: 319 mg/dL (17 Mar 2022 21:42)  POCT Blood Glucose.: 339 mg/dL (17 Mar 2022 18:28)  POCT Blood Glucose.: 416 mg/dL (17 Mar 2022 16:45)      BP: 124/66 (03-17-22 @ 23:55) (103/89 - 159/82)  Lipid Panel:

## 2022-03-18 NOTE — DISCHARGE NOTE PROVIDER - NSDCHHNEEDSERVICE_GEN_ALL_CORE
Central venous access care/Medication teaching and assessment/Observation and assessment/Teaching and training/Wound care and assessment

## 2022-03-21 ENCOUNTER — NON-APPOINTMENT (OUTPATIENT)
Age: 62
End: 2022-03-21

## 2022-03-21 ENCOUNTER — APPOINTMENT (OUTPATIENT)
Dept: ORTHOPEDIC SURGERY | Facility: CLINIC | Age: 62
End: 2022-03-21
Payer: COMMERCIAL

## 2022-03-21 DIAGNOSIS — L97.519 TYPE 2 DIABETES MELLITUS WITH FOOT ULCER: ICD-10-CM

## 2022-03-21 DIAGNOSIS — E11.621 TYPE 2 DIABETES MELLITUS WITH FOOT ULCER: ICD-10-CM

## 2022-03-21 DIAGNOSIS — M86.8X7 OTHER OSTEOMYELITIS, ANKLE AND FOOT: ICD-10-CM

## 2022-03-21 LAB
CULTURE RESULTS: SIGNIFICANT CHANGE UP
SPECIMEN SOURCE: SIGNIFICANT CHANGE UP

## 2022-03-21 PROCEDURE — 99214 OFFICE O/P EST MOD 30 MIN: CPT

## 2022-03-21 NOTE — ADDENDUM
[FreeTextEntry1] : I, Xochitl Santana, acted solely as a scribe for Dr. Robert Perrin on this date 03/21/2022.\par \par All medical record entries made by the Scribe were at my, Dr. Robert Perrin, direction and personally dictated by me on 03/21/2022 . I have reviewed the chart and agree that the record accurately reflects my personal performance of the history, physical exam, assessment and plan. I have also personally directed, reviewed, and agreed with the chart.	\par

## 2022-03-21 NOTE — DISCUSSION/SUMMARY
[de-identified] : Today I had a lengthy discussion with the patient regarding their diabetic ulcer of the right great toe. I have addressed all the patient's concerns surrounding the pathology of their condition. X-ray results and MRI results were reviewed with the patient. At this time, I recommended that the patient continue with daily wound care with daily 4 x 4 dressings and Bacitracin. I recommend that the patient utilize a stiff shoe. The patient was provided with the stiff shoe in the office today. I advised that the patient continue with the PICC line antibiotics and with home nursing care. We also discussed possible wound care treatment at [facility], a referral was given to the patient today. He may WBAT on his heel, with discussion for the utilization of a cane. He may elevate his right foot above the level of the heart. We discussed that the patient should remain active for DVT Prophylaxis. If the patient notices any worsening of fevers, chills, radiating redness or increased erythema, I advised the patient to travel to [facility] for further evaluation. I advised the patient to follow up with his PCP and with Endocrinology for Diabetic assessment. We discussed operative treatment options in great detail with respect to amputation if there is no improvement. The patient understood and verbally agreed to the treatment plan. All of their questions were answered and they were satisfied with the visit. The patient should call the office if they have any questions or experience worsening symptoms. I would like to see the patient back in the office in 1 week to reassess their condition.\par \par Patient is 100% temporarily disabled and will be out of work for the next 1 week. \par  	\par

## 2022-03-21 NOTE — PHYSICAL EXAM
[de-identified] : General: Alert and oriented x3. In no acute distress. Pleasant in nature with a normal affect. No apparent respiratory distress.\par \par Right Foot Exam\par Skin: Clean, dry, intact. Large ulceration at the bottom portion of the great toe. \par Inspection:  + swelling and redness to the great toe\par Pulses: 2+ DP/PT pulses\par ROM: FOOT Full  ROM of digits, ANKLE 10 degrees of dorsiflexion, 40 degrees of plantarflexion, 10 degrees of subtalar motion.\par Painful ROM: None\par Tenderness: No tenderness over the medial malleolus, No tenderness over the lateral malleolus, no CFL/ATFL/PTFL pain, no deltoid ligament pain. No heel pain. No Achilles tenderness. No 5th metatarsal pain. No pain to the LisFranc joint. No ttp over the posterior tibial tendon.\par Stability: Negative anterior/posterior drawer.\par Strength: 5/5 ADD/ABD/TA/GS/EHL/FHL/EDL\par Neuro: Sensation in tact to light touch throughout\par Additional tests: Negative Mortons test, negative tarsal tunnel tinels, negative single heel rise.			 [de-identified] : \par ACC: [id number] EXAM: MR FOOT WAW IC RT\par \par PROCEDURE DATE: 03/15/2022\par \par \par \par INTERPRETATION: MR FOOT WITHOUT AND WITH IV CONTRAST RIGHT dated 3/15/2022 4:00 PM\par \par INDICATION: Pain and swelling. Foot ulcer\par \par COMPARISON: Foot radiographs dated 3/14/2022\par \par TECHNIQUE: Multi-sequential, multiplanar MRI imaging of the right midfoot and forefoot was performed per standard protocol. Images were obtained before and after the administration of IV contrast.\par Contrast: Gadavist. Administered: 9 cc. Discarded: 1 cc.\par \par FINDINGS:\par \par BONE MARROW: There is marrow edema within the first proximal distal phalanx with mild associated marrow enhancement. There is decreased T1 marrow signal noted within the first distal phalanx. Narrowing productive change at the first metatarsophalangeal joint. Relative preservation the midfoot joint spaces.\par SYNOVIUM/ JOINT FLUID: Small fluid at the first interphalangeal joint space.\par TENDONS: The tendons are intact. Mild tenosynovitis surrounding the distal first flexor tendon.\par MUSCLES: Mild atrophy and edema within the intrinsic musculature the foot.\par NEUROVASCULAR STRUCTURES: Preserved\par SUBCUTANEOUS SOFT TISSUES: A skin wound is noted over the plantar surface of the great toe measuring 1.4 x 1.2 cm. There is adjacent skin thickening and subjacent edema and enhancement. No drainable fluid collection is noted. Small tract noted from the skin wound to the first interphalangeal joint space.\par \par IMPRESSION:\par \par 1. Plantar great toe skin wound with adjacent edema and enhancement concerning for cellulitis. No drainable fluid collection.\par 2. Small tract from the skin to the first interphalangeal joint space raising concern for septic arthritis.\par 3. Abnormal marrow signal involving the first proximal distal phalanges. Given the overlying skin wound, the findings are concerning for osteomyelitis.\par \par --- End of Report ---\par \par \par \par \par \par SONIDO LATHAM MD; Attending Radiologist\par This document has been electronically signed. Mar 15 2022 4:08PM\par \par \par ACC: [id number] EXAM: XR CHEST PORTABLE IMMED 1V\par ACC: [id number] EXAM: XR FOOT COMP MIN 3 VIEWS RT\par ACC: [id number] EXAM: XR ANKLE COMP MIN 3 VIEWS RT\par \par PROCEDURE DATE: 03/14/2022\par \par \par \par INTERPRETATION: Right ankle, right foot, and chest. Patient is suspect for foot infection and concern is possible osteomyelitis. Patient has sepsis.\par \par Right ankle. 3 views.\par \par Arterial calcifications. Posterior and inferior calcaneal spurs.\par \par No bone destruction or fracture.\par \par Right foot. 3 views.\par \par Mild to moderate degeneration of first MTP joint.\par \par No bone destruction, fracture, or tracking air.\par \par AP chest on March 14, 2022 at 11:09 AM.\par \par Heart size is within normal limits.\par \par Lungs are clear.\par \par Chest is similar to CAT scan of March 2.\par \par IMPRESSION: Chest unremarkable. Arterial calcifications of the foot. No acute finding.\par \par --- End of Report ---\par \par \par \par \par \par ALEK RICKS MD; Attending Radiologist\par This document has been electronically signed. Mar 14 2022 11:32AM\par \par \par ACC: [id number] EXAM: US PHYSIOL LWR EXT 3+ LEV BI\par \par PROCEDURE DATE: 03/16/2022\par \par \par \par INTERPRETATION: Clinical indication: Right hallux wound\par \par COMPARISON: None\par \par FINDINGS: There are biphasic pulse volume recordings bilaterally. Vessels are noncompressible in the upper right thigh, calves and ankles. Therefore, ABIs could not be calculated.\par \par IMPRESSION: ABIs could not be calculated.\par \par Multiple calcified, noncompressible vessels as described above.\par \par --- End of Report ---\par \par \par \par MITESH GOODE MD; Attending Radiologist\par This document has been electronically signed. Mar 16 2022 1:44PM\par Notes\par \par

## 2022-03-21 NOTE — HISTORY OF PRESENT ILLNESS
[FreeTextEntry1] : The patient is a 62 year old male with Diabetes presenting with his spouse for an initial evaluation of his right foot. He is here today to evaluate an atraumatic diabetic ulcer on his great toe. Due to flu-like symptoms, he was evaluated for this issue at [facility] ED on 3/14/2022, where x-rays and MRI results were concerning for osteomyelitis. The wound was redressed and the patient was recommended to continue with sterile wound care. He is currently receiving at home wound care with the nursing team at [facility]. He is currently on PICC line antibiotics as prescribed by the hospital. He presents today for further evaluation. He denies any pain to the wound. However, the patient is unsure if he has Neuropathy secondary due to history of Diabetes. He is ambulating on his heel with the stiff medical shoe. No other complaints.

## 2022-03-21 NOTE — REASON FOR VISIT
[Initial Visit] : an initial visit for [Spouse] : spouse [FreeTextEntry2] : right great toe diabetic ulcer

## 2022-03-23 ENCOUNTER — OUTPATIENT (OUTPATIENT)
Dept: OUTPATIENT SERVICES | Facility: HOSPITAL | Age: 62
LOS: 1 days | End: 2022-03-23
Payer: COMMERCIAL

## 2022-03-23 ENCOUNTER — OUTPATIENT (OUTPATIENT)
Dept: OUTPATIENT SERVICES | Facility: HOSPITAL | Age: 62
LOS: 1 days | End: 2022-03-23

## 2022-03-23 DIAGNOSIS — Z90.5 ACQUIRED ABSENCE OF KIDNEY: Chronic | ICD-10-CM

## 2022-03-23 DIAGNOSIS — L89.619 PRESSURE ULCER OF RIGHT HEEL, UNSPECIFIED STAGE: ICD-10-CM

## 2022-03-23 DIAGNOSIS — L97.512 NON-PRESSURE CHRONIC ULCER OF OTHER PART OF RIGHT FOOT WITH FAT LAYER EXPOSED: ICD-10-CM

## 2022-03-23 PROCEDURE — 11043 DBRDMT MUSC&/FSCA 1ST 20/<: CPT

## 2022-03-23 PROCEDURE — 87077 CULTURE AEROBIC IDENTIFY: CPT

## 2022-03-23 PROCEDURE — 87070 CULTURE OTHR SPECIMN AEROBIC: CPT

## 2022-03-23 PROCEDURE — G0463: CPT

## 2022-03-24 DIAGNOSIS — L89.619 PRESSURE ULCER OF RIGHT HEEL, UNSPECIFIED STAGE: ICD-10-CM

## 2022-03-25 DIAGNOSIS — E11.69 TYPE 2 DIABETES MELLITUS WITH OTHER SPECIFIED COMPLICATION: ICD-10-CM

## 2022-03-25 DIAGNOSIS — L03.031 CELLULITIS OF RIGHT TOE: ICD-10-CM

## 2022-03-25 DIAGNOSIS — L97.518 NON-PRESSURE CHRONIC ULCER OF OTHER PART OF RIGHT FOOT WITH OTHER SPECIFIED SEVERITY: ICD-10-CM

## 2022-03-25 DIAGNOSIS — A41.9 SEPSIS, UNSPECIFIED ORGANISM: ICD-10-CM

## 2022-03-25 DIAGNOSIS — Z79.84 LONG TERM (CURRENT) USE OF ORAL HYPOGLYCEMIC DRUGS: ICD-10-CM

## 2022-03-25 DIAGNOSIS — Z85.528 PERSONAL HISTORY OF OTHER MALIGNANT NEOPLASM OF KIDNEY: ICD-10-CM

## 2022-03-25 DIAGNOSIS — M00.9 PYOGENIC ARTHRITIS, UNSPECIFIED: ICD-10-CM

## 2022-03-25 DIAGNOSIS — L02.611 CUTANEOUS ABSCESS OF RIGHT FOOT: ICD-10-CM

## 2022-03-25 DIAGNOSIS — E11.65 TYPE 2 DIABETES MELLITUS WITH HYPERGLYCEMIA: ICD-10-CM

## 2022-03-25 DIAGNOSIS — M86.171 OTHER ACUTE OSTEOMYELITIS, RIGHT ANKLE AND FOOT: ICD-10-CM

## 2022-03-25 DIAGNOSIS — I10 ESSENTIAL (PRIMARY) HYPERTENSION: ICD-10-CM

## 2022-03-25 DIAGNOSIS — E11.621 TYPE 2 DIABETES MELLITUS WITH FOOT ULCER: ICD-10-CM

## 2022-03-25 DIAGNOSIS — N17.9 ACUTE KIDNEY FAILURE, UNSPECIFIED: ICD-10-CM

## 2022-03-25 DIAGNOSIS — R65.20 SEVERE SEPSIS WITHOUT SEPTIC SHOCK: ICD-10-CM

## 2022-03-30 ENCOUNTER — APPOINTMENT (OUTPATIENT)
Dept: ORTHOPEDIC SURGERY | Facility: CLINIC | Age: 62
End: 2022-03-30

## 2022-03-31 ENCOUNTER — INPATIENT (INPATIENT)
Facility: HOSPITAL | Age: 62
LOS: 1 days | Discharge: HOME HEALTH | DRG: 617 | End: 2022-04-02
Attending: HOSPITALIST | Admitting: STUDENT IN AN ORGANIZED HEALTH CARE EDUCATION/TRAINING PROGRAM
Payer: COMMERCIAL

## 2022-03-31 VITALS — WEIGHT: 190.04 LBS | HEIGHT: 71 IN

## 2022-03-31 DIAGNOSIS — E11.65 TYPE 2 DIABETES MELLITUS WITH HYPERGLYCEMIA: ICD-10-CM

## 2022-03-31 DIAGNOSIS — E88.09 OTHER DISORDERS OF PLASMA-PROTEIN METABOLISM, NOT ELSEWHERE CLASSIFIED: ICD-10-CM

## 2022-03-31 DIAGNOSIS — Z85.528 PERSONAL HISTORY OF OTHER MALIGNANT NEOPLASM OF KIDNEY: ICD-10-CM

## 2022-03-31 DIAGNOSIS — I10 ESSENTIAL (PRIMARY) HYPERTENSION: ICD-10-CM

## 2022-03-31 DIAGNOSIS — E11.621 TYPE 2 DIABETES MELLITUS WITH FOOT ULCER: ICD-10-CM

## 2022-03-31 DIAGNOSIS — Z79.4 LONG TERM (CURRENT) USE OF INSULIN: ICD-10-CM

## 2022-03-31 DIAGNOSIS — D35.00 BENIGN NEOPLASM OF UNSPECIFIED ADRENAL GLAND: ICD-10-CM

## 2022-03-31 DIAGNOSIS — B95.4 OTHER STREPTOCOCCUS AS THE CAUSE OF DISEASES CLASSIFIED ELSEWHERE: ICD-10-CM

## 2022-03-31 DIAGNOSIS — M86.171 OTHER ACUTE OSTEOMYELITIS, RIGHT ANKLE AND FOOT: ICD-10-CM

## 2022-03-31 DIAGNOSIS — E11.69 TYPE 2 DIABETES MELLITUS WITH OTHER SPECIFIED COMPLICATION: ICD-10-CM

## 2022-03-31 DIAGNOSIS — D64.9 ANEMIA, UNSPECIFIED: ICD-10-CM

## 2022-03-31 DIAGNOSIS — L03.115 CELLULITIS OF RIGHT LOWER LIMB: ICD-10-CM

## 2022-03-31 DIAGNOSIS — Z90.5 ACQUIRED ABSENCE OF KIDNEY: Chronic | ICD-10-CM

## 2022-03-31 DIAGNOSIS — L97.516 NON-PRESSURE CHRONIC ULCER OF OTHER PART OF RIGHT FOOT WITH BONE INVOLVEMENT WITHOUT EVIDENCE OF NECROSIS: ICD-10-CM

## 2022-03-31 LAB
ALBUMIN SERPL ELPH-MCNC: 3.2 G/DL — LOW (ref 3.3–5)
ALP SERPL-CCNC: 60 U/L — SIGNIFICANT CHANGE UP (ref 40–120)
ALT FLD-CCNC: 32 U/L — SIGNIFICANT CHANGE UP (ref 12–78)
ANION GAP SERPL CALC-SCNC: 6 MMOL/L — SIGNIFICANT CHANGE UP (ref 5–17)
APTT BLD: 33.7 SEC — SIGNIFICANT CHANGE UP (ref 27.5–35.5)
AST SERPL-CCNC: 17 U/L — SIGNIFICANT CHANGE UP (ref 15–37)
BASOPHILS # BLD AUTO: 0.05 K/UL — SIGNIFICANT CHANGE UP (ref 0–0.2)
BASOPHILS NFR BLD AUTO: 0.6 % — SIGNIFICANT CHANGE UP (ref 0–2)
BILIRUB SERPL-MCNC: 0.5 MG/DL — SIGNIFICANT CHANGE UP (ref 0.2–1.2)
BLD GP AB SCN SERPL QL: SIGNIFICANT CHANGE UP
BUN SERPL-MCNC: 17 MG/DL — SIGNIFICANT CHANGE UP (ref 7–23)
CALCIUM SERPL-MCNC: 9.7 MG/DL — SIGNIFICANT CHANGE UP (ref 8.5–10.1)
CHLORIDE SERPL-SCNC: 108 MMOL/L — SIGNIFICANT CHANGE UP (ref 96–108)
CO2 SERPL-SCNC: 25 MMOL/L — SIGNIFICANT CHANGE UP (ref 22–31)
CREAT SERPL-MCNC: 0.77 MG/DL — SIGNIFICANT CHANGE UP (ref 0.5–1.3)
CRP SERPL-MCNC: 7 MG/L — HIGH
EGFR: 102 ML/MIN/1.73M2 — SIGNIFICANT CHANGE UP
EOSINOPHIL # BLD AUTO: 0.07 K/UL — SIGNIFICANT CHANGE UP (ref 0–0.5)
EOSINOPHIL NFR BLD AUTO: 0.9 % — SIGNIFICANT CHANGE UP (ref 0–6)
ERYTHROCYTE [SEDIMENTATION RATE] IN BLOOD: 20 MM/HR — SIGNIFICANT CHANGE UP (ref 0–20)
GLUCOSE BLDC GLUCOMTR-MCNC: 178 MG/DL — HIGH (ref 70–99)
GLUCOSE BLDC GLUCOMTR-MCNC: 96 MG/DL — SIGNIFICANT CHANGE UP (ref 70–99)
GLUCOSE SERPL-MCNC: 140 MG/DL — HIGH (ref 70–99)
HCT VFR BLD CALC: 37.5 % — LOW (ref 39–50)
HGB BLD-MCNC: 12.3 G/DL — LOW (ref 13–17)
IMM GRANULOCYTES NFR BLD AUTO: 0.6 % — SIGNIFICANT CHANGE UP (ref 0–1.5)
INR BLD: 1.1 RATIO — SIGNIFICANT CHANGE UP (ref 0.88–1.16)
LACTATE SERPL-SCNC: 0.9 MMOL/L — SIGNIFICANT CHANGE UP (ref 0.7–2)
LYMPHOCYTES # BLD AUTO: 1.66 K/UL — SIGNIFICANT CHANGE UP (ref 1–3.3)
LYMPHOCYTES # BLD AUTO: 20.9 % — SIGNIFICANT CHANGE UP (ref 13–44)
MCHC RBC-ENTMCNC: 27 PG — SIGNIFICANT CHANGE UP (ref 27–34)
MCHC RBC-ENTMCNC: 32.8 GM/DL — SIGNIFICANT CHANGE UP (ref 32–36)
MCV RBC AUTO: 82.4 FL — SIGNIFICANT CHANGE UP (ref 80–100)
MONOCYTES # BLD AUTO: 0.54 K/UL — SIGNIFICANT CHANGE UP (ref 0–0.9)
MONOCYTES NFR BLD AUTO: 6.8 % — SIGNIFICANT CHANGE UP (ref 2–14)
NEUTROPHILS # BLD AUTO: 5.58 K/UL — SIGNIFICANT CHANGE UP (ref 1.8–7.4)
NEUTROPHILS NFR BLD AUTO: 70.2 % — SIGNIFICANT CHANGE UP (ref 43–77)
PLATELET # BLD AUTO: 277 K/UL — SIGNIFICANT CHANGE UP (ref 150–400)
POTASSIUM SERPL-MCNC: 4.1 MMOL/L — SIGNIFICANT CHANGE UP (ref 3.5–5.3)
POTASSIUM SERPL-SCNC: 4.1 MMOL/L — SIGNIFICANT CHANGE UP (ref 3.5–5.3)
PROT SERPL-MCNC: 7.9 GM/DL — SIGNIFICANT CHANGE UP (ref 6–8.3)
PROTHROM AB SERPL-ACNC: 12.8 SEC — SIGNIFICANT CHANGE UP (ref 10.5–13.4)
RBC # BLD: 4.55 M/UL — SIGNIFICANT CHANGE UP (ref 4.2–5.8)
RBC # FLD: 14 % — SIGNIFICANT CHANGE UP (ref 10.3–14.5)
SARS-COV-2 RNA SPEC QL NAA+PROBE: SIGNIFICANT CHANGE UP
SODIUM SERPL-SCNC: 139 MMOL/L — SIGNIFICANT CHANGE UP (ref 135–145)
WBC # BLD: 7.95 K/UL — SIGNIFICANT CHANGE UP (ref 3.8–10.5)
WBC # FLD AUTO: 7.95 K/UL — SIGNIFICANT CHANGE UP (ref 3.8–10.5)

## 2022-03-31 PROCEDURE — 83605 ASSAY OF LACTIC ACID: CPT

## 2022-03-31 PROCEDURE — 87040 BLOOD CULTURE FOR BACTERIA: CPT

## 2022-03-31 PROCEDURE — 73630 X-RAY EXAM OF FOOT: CPT | Mod: RT

## 2022-03-31 PROCEDURE — 87077 CULTURE AEROBIC IDENTIFY: CPT

## 2022-03-31 PROCEDURE — 71045 X-RAY EXAM CHEST 1 VIEW: CPT | Mod: 26

## 2022-03-31 PROCEDURE — 93010 ELECTROCARDIOGRAM REPORT: CPT

## 2022-03-31 PROCEDURE — 87070 CULTURE OTHR SPECIMN AEROBIC: CPT

## 2022-03-31 PROCEDURE — 80048 BASIC METABOLIC PNL TOTAL CA: CPT

## 2022-03-31 PROCEDURE — 36415 COLL VENOUS BLD VENIPUNCTURE: CPT

## 2022-03-31 PROCEDURE — 87186 SC STD MICRODIL/AGAR DIL: CPT

## 2022-03-31 PROCEDURE — 88311 DECALCIFY TISSUE: CPT

## 2022-03-31 PROCEDURE — 87075 CULTR BACTERIA EXCEPT BLOOD: CPT

## 2022-03-31 PROCEDURE — 99285 EMERGENCY DEPT VISIT HI MDM: CPT

## 2022-03-31 PROCEDURE — 85025 COMPLETE CBC W/AUTO DIFF WBC: CPT

## 2022-03-31 PROCEDURE — 82962 GLUCOSE BLOOD TEST: CPT

## 2022-03-31 PROCEDURE — 85027 COMPLETE CBC AUTOMATED: CPT

## 2022-03-31 PROCEDURE — 73630 X-RAY EXAM OF FOOT: CPT | Mod: 26,RT

## 2022-03-31 PROCEDURE — 88305 TISSUE EXAM BY PATHOLOGIST: CPT

## 2022-03-31 PROCEDURE — 99223 1ST HOSP IP/OBS HIGH 75: CPT

## 2022-03-31 RX ORDER — DEXTROSE 50 % IN WATER 50 %
15 SYRINGE (ML) INTRAVENOUS ONCE
Refills: 0 | Status: DISCONTINUED | OUTPATIENT
Start: 2022-03-31 | End: 2022-04-02

## 2022-03-31 RX ORDER — GLUCAGON INJECTION, SOLUTION 0.5 MG/.1ML
1 INJECTION, SOLUTION SUBCUTANEOUS ONCE
Refills: 0 | Status: DISCONTINUED | OUTPATIENT
Start: 2022-03-31 | End: 2022-04-02

## 2022-03-31 RX ORDER — ONDANSETRON 8 MG/1
4 TABLET, FILM COATED ORAL EVERY 8 HOURS
Refills: 0 | Status: DISCONTINUED | OUTPATIENT
Start: 2022-03-31 | End: 2022-04-02

## 2022-03-31 RX ORDER — LISINOPRIL 2.5 MG/1
40 TABLET ORAL DAILY
Refills: 0 | Status: DISCONTINUED | OUTPATIENT
Start: 2022-03-31 | End: 2022-04-02

## 2022-03-31 RX ORDER — ACETAMINOPHEN 500 MG
650 TABLET ORAL EVERY 6 HOURS
Refills: 0 | Status: DISCONTINUED | OUTPATIENT
Start: 2022-03-31 | End: 2022-04-02

## 2022-03-31 RX ORDER — DEXTROSE 50 % IN WATER 50 %
25 SYRINGE (ML) INTRAVENOUS ONCE
Refills: 0 | Status: DISCONTINUED | OUTPATIENT
Start: 2022-03-31 | End: 2022-04-02

## 2022-03-31 RX ORDER — INSULIN GLARGINE 100 [IU]/ML
20 INJECTION, SOLUTION SUBCUTANEOUS AT BEDTIME
Refills: 0 | Status: DISCONTINUED | OUTPATIENT
Start: 2022-03-31 | End: 2022-04-02

## 2022-03-31 RX ORDER — CEFEPIME 1 G/1
2000 INJECTION, POWDER, FOR SOLUTION INTRAMUSCULAR; INTRAVENOUS EVERY 12 HOURS
Refills: 0 | Status: DISCONTINUED | OUTPATIENT
Start: 2022-03-31 | End: 2022-04-02

## 2022-03-31 RX ORDER — DEXTROSE 50 % IN WATER 50 %
12.5 SYRINGE (ML) INTRAVENOUS ONCE
Refills: 0 | Status: DISCONTINUED | OUTPATIENT
Start: 2022-03-31 | End: 2022-04-02

## 2022-03-31 RX ORDER — ASCORBIC ACID 60 MG
500 TABLET,CHEWABLE ORAL DAILY
Refills: 0 | Status: DISCONTINUED | OUTPATIENT
Start: 2022-03-31 | End: 2022-04-02

## 2022-03-31 RX ORDER — INSULIN LISPRO 100/ML
VIAL (ML) SUBCUTANEOUS
Refills: 0 | Status: DISCONTINUED | OUTPATIENT
Start: 2022-03-31 | End: 2022-04-02

## 2022-03-31 RX ORDER — SODIUM CHLORIDE 9 MG/ML
1000 INJECTION, SOLUTION INTRAVENOUS
Refills: 0 | Status: DISCONTINUED | OUTPATIENT
Start: 2022-03-31 | End: 2022-04-02

## 2022-03-31 RX ORDER — INSULIN LISPRO 100/ML
VIAL (ML) SUBCUTANEOUS AT BEDTIME
Refills: 0 | Status: DISCONTINUED | OUTPATIENT
Start: 2022-03-31 | End: 2022-04-02

## 2022-03-31 RX ORDER — ALPRAZOLAM 0.25 MG
0.5 TABLET ORAL EVERY 12 HOURS
Refills: 0 | Status: DISCONTINUED | OUTPATIENT
Start: 2022-03-31 | End: 2022-04-02

## 2022-03-31 RX ORDER — AMLODIPINE BESYLATE 2.5 MG/1
5 TABLET ORAL DAILY
Refills: 0 | Status: DISCONTINUED | OUTPATIENT
Start: 2022-03-31 | End: 2022-04-02

## 2022-03-31 RX ORDER — MORPHINE SULFATE 50 MG/1
2 CAPSULE, EXTENDED RELEASE ORAL EVERY 4 HOURS
Refills: 0 | Status: DISCONTINUED | OUTPATIENT
Start: 2022-03-31 | End: 2022-04-02

## 2022-03-31 RX ORDER — LANOLIN ALCOHOL/MO/W.PET/CERES
5 CREAM (GRAM) TOPICAL AT BEDTIME
Refills: 0 | Status: DISCONTINUED | OUTPATIENT
Start: 2022-03-31 | End: 2022-04-02

## 2022-03-31 RX ORDER — INFLUENZA VIRUS VACCINE 15; 15; 15; 15 UG/.5ML; UG/.5ML; UG/.5ML; UG/.5ML
0.5 SUSPENSION INTRAMUSCULAR ONCE
Refills: 0 | Status: DISCONTINUED | OUTPATIENT
Start: 2022-03-31 | End: 2022-04-02

## 2022-03-31 RX ORDER — SODIUM CHLORIDE 9 MG/ML
1000 INJECTION INTRAMUSCULAR; INTRAVENOUS; SUBCUTANEOUS
Refills: 0 | Status: DISCONTINUED | OUTPATIENT
Start: 2022-03-31 | End: 2022-04-02

## 2022-03-31 RX ADMIN — Medication 0.5 MILLIGRAM(S): at 21:57

## 2022-03-31 RX ADMIN — INSULIN GLARGINE 20 UNIT(S): 100 INJECTION, SOLUTION SUBCUTANEOUS at 23:19

## 2022-03-31 RX ADMIN — CEFEPIME 100 MILLIGRAM(S): 1 INJECTION, POWDER, FOR SOLUTION INTRAMUSCULAR; INTRAVENOUS at 22:02

## 2022-03-31 RX ADMIN — Medication 100 MILLIGRAM(S): at 22:04

## 2022-03-31 RX ADMIN — SODIUM CHLORIDE 75 MILLILITER(S): 9 INJECTION INTRAMUSCULAR; INTRAVENOUS; SUBCUTANEOUS at 22:02

## 2022-03-31 RX ADMIN — Medication 5 MILLIGRAM(S): at 23:20

## 2022-03-31 RX ADMIN — SODIUM CHLORIDE 75 MILLILITER(S): 9 INJECTION INTRAMUSCULAR; INTRAVENOUS; SUBCUTANEOUS at 23:19

## 2022-03-31 NOTE — ED STATDOCS - PHYSICAL EXAMINATION
Vital signs as available reviewed.  General:  No acute distress.  Head:  Normocephalic, atraumatic.  Eyes:  Conjunctiva pink, no icterus.  Cardiovascular:  mild tachycardia, no obvious murmur.  Respiratory:  Clear to auscultation, good air entry bilaterally.  Abdomen:  Soft, non-tender.  Musculoskeletal:  right great toe with erythema, dorsal and ventral ulcers with packing in place. + increased warmth.  Neurologic: Alert and oriented, moving all extremities.  Skin:  Warm and dry.

## 2022-03-31 NOTE — PHARMACOTHERAPY INTERVENTION NOTE - COMMENTS
Medication reconciliation completed.  Reviewed Medication list and confirmed med allergies with patient; confirmed with Dr. First Medlorena.

## 2022-03-31 NOTE — ED STATDOCS - ATTENDING CONTRIBUTION TO CARE
This was a shared visit with DANIELLE. I reviewed and verified the documentation and independently performed the documented MDM. I, Balaji Potter DO, personally saw the patient with ACP.  I performed a substantiative portion of the visit. I personally performed a face to face diagnostic evaluation on this patient and formulated the patient plan. Free text HPI, ROS, PE, MDM documented above by myself or with the aid of a scribe and represents my findings. The case was discussed with, and handed off to ACP who followed the case through to the re-evaluation and disposition.

## 2022-03-31 NOTE — ED STATDOCS - PROGRESS NOTE DETAILS
63 yo male with a PMH of dm, htn, diabetic foot ulcer presents with R toe infection. Pt was seen by Dr. Griffith (podiatry) who sent him in for amputation of the R big toe. Pt had an toe ulcer and MRI which showed osteomyelitis and was placed on a PICC line for cefepime q12h and PO doxy; however the abx have not been working as well and now needs surgery. Will obtain labs, XR, CXR, consult podiatry and admit. -Zohaib Boone PA-C   PMD= [place] Spoke with podiatry resident who is aware that pt arrived in the ED for admission. -Zohaib Boone PA-C

## 2022-03-31 NOTE — CONSULT NOTE ADULT - SUBJECTIVE AND OBJECTIVE BOX
Date of Service: 3/31/22  HPI; 61 y/o male well known to podiatry p/w with h/o of right great toe OM. Pt seen by Dr. Perrin per previous admission then transitioned care to Dr. Griffith at [facility] wound care center. Pt opted for attempted conservative treatment of right Hallux infection and d/c'd on PICC line with Cefepime. After consultation with Dr. Griffith was advised to pt that hallux was not salvageable and should come to  for admission and surgical procedure in the OR. Pt denies contralateral limb pain. Denies any F/N/V/C/SOB when seen     REVIEW OF SYSTEMS:  CONSTITUTIONAL: No weakness, no fevers   EYES/ENT: No visual changes;  No vertigo or throat pain   NECK: No pain or stiffness  RESPIRATORY: No cough, wheezing, hemoptysis; No shortness of breath  CARDIOVASCULAR: No chest pain or palpitations  GASTROINTESTINAL: No abdominal or epigastric pain. No nausea, vomiting, or hematemesis; No diarrhea or constipation. No melena or hematochezia.  GENITOURINARY: No dysuria, frequency or hematuria  NEUROLOGICAL: No numbness or weakness  SKIN: Wound to R plantar hallux.   All other review of systems is negative unless indicated above    PMH:  Malignant Neoplasm of Kidney  Diabetes  Adrenal Adenoma  Anxiety  HTN (hypertension)    PSH:  History of Cholecystectomy  Basal Cell Carcinoma of Nose Excision  H/O partial nephrectomy    Allergies:  No Known Allergies    MEDICATIONS  (STANDING):  cefepime   IVPB 2000 milliGRAM(s) IV Intermittent every 12 hours    Vitals  Vital Signs Last 24 Hrs  T(C): 36.8 (31 Mar 2022 15:54), Max: 36.8 (31 Mar 2022 15:54)  T(F): 98.3 (31 Mar 2022 15:54), Max: 98.3 (31 Mar 2022 15:54)  HR: 102 (31 Mar 2022 15:54) (102 - 102)  BP: 153/77 (31 Mar 2022 15:54) (153/77 - 153/77)  BP(mean): 112 (31 Mar 2022 15:54) (112 - 112)  RR: 17 (31 Mar 2022 15:54) (17 - 17)  SpO2: 100% (31 Mar 2022 15:54) (100% - 100%)    Physical Exam:   Constitutiona: NAD, alert;  Derm:    R hallux 0.5 x 0.5 cm, plantar ulceration, probe to bone +, malodor -, no pain on palpation, + swelling, + erythema/cellulitic changes, + sero- purulent drainage, + tunneling/tracking.    Vascular: Dorsalis Pedis and Posterior Tibial pulses palpable 1/4.  Capillary re-fill time less then 3 seconds digits 2-5 on Rt and greater than 5 sconds to hallux on Rt.    Cyanotic appearing R distal hallux.   Neuro: Protective sensation diminished to the level of the digits bilateral.  MSK: Muscle strength 5/5 in all major muscle groups of Right lower extremity. 5/5 in all muscle groups of LLE;  .        Labs:    Culture - Abscess with Gram Stain (03.15.22 @ 18:00)   Specimen Source: .Abscess Right Foot wound   Culture Results:   Few Streptococcus constellatus "Susceptibilities not performed"   Insufficient growth-culture reincubated        Rad/Ekg:  < from: MR Foot w/wo IV Cont, Right (03.15.22 @ 16:00) >  IMPRESSION:    1.  Plantar great toe skin wound with adjacent edema and enhancement   concerning for cellulitis. No drainable fluid collection.  2.  Small tract from the skin to the first interphalangeal joint space   raising concern for septic arthritis.  3.  Abnormal marrow signal involving the first proximal distal phalanges.   Given the overlying skin wound, the findings are concerning for   osteomyelitis.    --- End of Report ---      < end of copied text >    < from: VA Physiol Extremity Lower 3+ Level, BI (03.16.22 @ 13:28) >  FINDINGS: There are biphasic pulse volume recordings bilaterally. Vessels   are noncompressible in the upper right thigh, calves and ankles.   Therefore, ABIs could not be calculated.    IMPRESSION: ABIs could not be calculated.    Multiple calcified, noncompressible vessels as described above.    < end of copied text >

## 2022-03-31 NOTE — ED ADULT NURSE NOTE - NS ED PATIENT SAFETY CONCERN
Face to face report given with opportunity to observe patient.    Report given to Gisselle SIMPSON   5/14/2019  7:39 AM       No

## 2022-03-31 NOTE — PATIENT PROFILE ADULT - OVER THE PAST TWO WEEKS, HAVE YOU FELT LITTLE INTEREST OR PLEASURE IN DOING THINGS?
no 54 year old male states that he has had a cold for the last few days described as runny nose and congestion. patient went to out patient urgent care center and told had virus and had elevated BP reading. patient went back to urgent care center and said BP was still elevated and he should see cardiologist and has appointment with Dr. Lua at 5pm Thursday. patient here tonight because BP was 140/100 and states that he wanted to be checked again. patient denies chest pain, shortness of breath, abd pain, nausea, vomiting, diarrhea, back pain.

## 2022-03-31 NOTE — H&P ADULT - ASSESSMENT
61 y/o M presents with right foot ulcer     1. Right 1st digit ulceration and osteomyelitis   - Admit to med/surg   - OR tomorrow for amputation of hallux   - WBAT, Pain control   - c/w abx - Cefepime and doxycycline   - IVF with NS at 75 ml/hr  - NPO after midnight    - Podiatry recs appreciated - OR tomorrow   - ID consult - Dr. Granger   - Patient is medically optimized for surgery. The patient is determined to be an average risk for complications following an average risk procedure. Benefits and risks of surgery discussed with pt at the bedside.    - Revised cardiac risk index for pre-operative risk = 1 points, class II risk -> 6.0% for a 30 day risk of death, MI, or cardiac arrest     2. Hypertension   - //77, monitor closely   - c/w anti-HTN agents     3. Normocytic anemia   - Hb 12.3, monitor     4. Hyperglycemia in the setting of Type 2 DM   - Glucose 140, monitor   - Diabetic diet     5. Hypoalbuminemia   - Albumin 3.2  - Nutrition consult     6. History of Type 2 DM, adrenal adneoma, renal carcinoma s/p nephrectomy in remission, anxiety, HTN  - c/w home medications   - Home medications verified at the bedside   - Pt with erythema surrounding insulin injection sites, please monitor for expansion of erythema/cellulitis     DVT ppx: SCDs (can restart pharmacological anticoagulation after surgery)   Code status: Full code (pt agrees to chest compressions and intubation if required). 63 y/o M presents with right foot ulcer     1. Right 1st digit ulceration and osteomyelitis   - Admit to med/surg   - OR tomorrow for amputation of hallux   - WBAT, Pain control   - c/w abx - Cefepime and doxycycline   - IVF with NS at 75 ml/hr  - NPO after midnight    - Podiatry recs appreciated - OR tomorrow   - ID consult - Dr. Granger   - Patient is medically optimized for surgery. The patient is determined to be an average risk for complications following an average risk procedure. Benefits and risks of surgery discussed with pt at the bedside.    - Revised cardiac risk index for pre-operative risk = 1 points, class II risk -> 6.0% for a 30 day risk of death, MI, or cardiac arrest     2. Hypertension   - //77, monitor closely   - c/w anti-HTN agents     3. Normocytic anemia   - Hb 12.3, monitor     4. Hyperglycemia in the setting of Type 2 DM   - Glucose 140, monitor   - Diabetic diet   - Pt on Toujeo 40 units daily, Alogliptin 25 mg daily, Metformin 500 mg 2 tabs BID, Repaglinide 2 mg TID   - Since pt will be NPO overnight, will give 20 units of Lantus and start moderate dose ISS   - After surgery, once diet is advanced will increase Lantus to 40 and add TIDAC insulin     5. Hypoalbuminemia   - Albumin 3.2  - Nutrition consult     6. History of Type 2 DM, adrenal adenoma renal carcinoma s/p partial nephrectomy in remission, anxiety, HTN  - c/w home medications   - Home medications verified at the bedside   - Pt with erythema surrounding insulin injection sites, please monitor for expansion of erythema/cellulitis     DVT ppx: SCDs (can restart pharmacological anticoagulation after surgery)   Code status: Full code (pt agrees to chest compressions and intubation if required).

## 2022-03-31 NOTE — ED STATDOCS - NS ED ATTENDING STATEMENT MOD
2 This was a shared visit with the DANIELLE. I reviewed and verified the documentation and independently performed the documented:

## 2022-03-31 NOTE — ED STATDOCS - OBJECTIVE STATEMENT
62 M with DM, HTN send it by podiatrist for admission for surgery. patient with OM, on outpatient antibiotics via picc line. no recent fevers, chills, nausea, vomiting. Had IV antibiotics this morning at 8 am. PMD Dr. Perdue.

## 2022-03-31 NOTE — H&P ADULT - HISTORY OF PRESENT ILLNESS
61 y/o M with PMH Type 2 DM, adrenal adneoma, renal carcinoma s/p partial nephrectomy in remission, anxiety, HTN presents with right 1st digit ulcer. Pt had a right 1st digit infection and was d/c from  on Cefepime with a PICC line. Dr. Griffith evaluated the pt and advised the pt to come to  for admisson adn surgical procedure in the ER as hallux is not salvageable. Reports redness at insulin injection sites and swelling of the right leg with ulcer of the 1st toe. Denies headaches, blurry vision, dizziness, fevers, chills, chest pain, SOB, abdominal pain, N/V, diarrhea/constipation. No chest pain or SOB with exertion. Able to climb a flight of stairs without chest pain/SOB.     Prior admission:   - 3/14/2022-3/18/2022: Right distal hallux foot ulcer/osteomyelitis -> wash out and drainage, on Cefepime and doxycycline BID for 6 weeks via PICC. Culture positive for Streptococcus constellatus.     ER course: HR , //77. Labs: Hb 12.3, Glucose 140, Albumin 3.2. EKG: NSR with HR 97 bpm, normal intervals, no ST segment changes, no T wave inversions (personally reviewed).     Imaging:   - XR right foot: bone spurs, osteoarthritis, bony erosion 1st digit, no fracture/dislocation (personally reviewed).   - CXR: no consolidation, no effusion, no pneumothorax, gastric bubble (personally reviewed).     Pt is being admitted to med/surg for further management.

## 2022-03-31 NOTE — CONSULT NOTE ADULT - ASSESSMENT
Assesment: 60 y/o male see inpatient for the following   -Non- Healing Full thickness Right proximal phalanx plantar ulceration, clinically infected   -OM of Rt proximal phalanx.   -Diabetes Mellitus type 2  -Pain in Right Foot   -Cellulitis of Right foot  -Difficulty with ambulation    Plan:   Chart reviewed and Patient evaluated;    D/W pt that after failed attempt at conservative therapy pt will require amputation of his right hallux  Pt demonstrated verbal understanding  Wound flushed with saline  Dressed with betadine and DSD dressings  X-rays Pending  Previous MRI results noted above.   Previous CHRISSY/PVR findings noted above   Previous Rt Foot wound cultures findings noted above     Discussed importance of daily foot examinations and proper shoe gear and to importance of lower Fasting Blood Glucose levels.   Please have pt admitted to medicine and optimized for OR on 4/1/22, appreciated   Pt to be NPO after midnight    Case D/W Dr. Griffith   Plan for OR with podiatry on 4/1/22  Podiatry will follow while in house.

## 2022-03-31 NOTE — H&P ADULT - NSICDXFAMILYHX_GEN_ALL_CORE_FT
FAMILY HISTORY:  Father  Still living? Unknown  FH: diabetes mellitus, Age at diagnosis: Age Unknown  FH: heart disease, Age at diagnosis: Age Unknown    Mother  Still living? Unknown  FH: diabetes mellitus, Age at diagnosis: Age Unknown  FH: heart disease, Age at diagnosis: Age Unknown

## 2022-03-31 NOTE — H&P ADULT - NSHPREVIEWOFSYSTEMS_GEN_ALL_CORE
Constitutional: negative for fatigue, negative for fever, negative for chills, negative for decreased appetite.  Skin: positive for rashes, positive for open wounds, negative for jaundice.   Eyes: negative for blurry vision, negative for double vision.   Ears, nose, throat: negative for ear pain, negative for nasal congestion, negative for sore throat, negative for lymph node swelling.   Cardiovascular: negative for chest pain, negative for palpitations, negative for lower extremity swelling.   Respiratory: negative for shortness of breath, negative for wheezing, negative for cough.   Gastrointestinal: negative for abdominal pain, negative for nausea, negative for vomiting, negative for diarrhea, negative for constipation, negative for blood in the stool, negative for black tarry stools.   Genitourinary: negative for burning on urination, negative for urinary urgency or frequency, negative for blood in the urine.   Endocrine: negative for cold intolerance, negative for heat intolerance, negative for increased thirst.   Hematologic: negative for easy bruising or bleeding.   Musculoskeletal: negative for muscle/joint pain, negative for decreased range of motion, positive for foot ulcer   Neurological: negative for dizziness, negative for headaches, negative for loss of consciousness, negative for motor weakness, negative for sensory deficits.   Psychiatric: negative for depression, negative for anxiety.

## 2022-03-31 NOTE — ED STATDOCS - NS ED ROS FT
Constitutional: No reported recent fever.  Neurological: No reported acute headache.  Eyes: No reported new vision changes.   Ears, Nose, Mouth, Throat: No reported acute sore throat.  Cardiovascular: No reported current chest pain.  Respiratory: No reported new shortness of breath.  Gastrointestinal: No reported vomiting.  Genitourinary: No reported new urinary problems.  Musculoskeletal: + toe ulcer  Integumentary (skin and/or breast): No reported new rash.

## 2022-03-31 NOTE — ED STATDOCS - CARE PLAN
1 Principal Discharge DX:	Diabetic toe ulcer   Principal Discharge DX:	Diabetic toe ulcer  Secondary Diagnosis:	Osteomyelitis of ankle or foot, acute

## 2022-03-31 NOTE — H&P ADULT - NSHPPHYSICALEXAM_GEN_ALL_CORE
HR 91, /76, RR 18, Temp 98.2    General: Awake and alert, cooperative with exam. No acute distress.   Skin: Warm, dry, and pink.   Eyes: Pupils equal and reactive to light. Extraocular eye movements intact. No conjunctival injection, discharge, or scleral icterus.   HEENT: Atraumatic, normocephalic. Moist mucus membranes.   Cardiology: Normal S1, S2. No murmurs, rubs, or gallops. Regular rate and rhythm.   Respiratory: Lungs clear to ascultation bilaterally. Good air exchange. No wheezes, rales, or rhonchi. Normal chest expansion.   Gastrointestinal: Positive bowel sounds. Soft, non-tender, non-distended. No guarding, rigidity, or rebound tenderness. No hepatosplenomegaly. Erythema surrounding insulin injection sites.   Musculoskeletal: 5/5 motor strength in all extremities. Normal range of motion.   Extremities: No peripheral edema bilaterally. Dorsalis pedis pulses 2+ bilaterally. Right 1st digit with ulceration, no purulent drainage or erythema currently.   Neurological: A+Ox3 (person, place, and time). Cranial nerves 2-12 intact. Normal speech. No facial droop. No focal neurological deficits.   Psychiatric: Normal affect. Normal mood.

## 2022-04-01 ENCOUNTER — TRANSCRIPTION ENCOUNTER (OUTPATIENT)
Age: 62
End: 2022-04-01

## 2022-04-01 ENCOUNTER — RESULT REVIEW (OUTPATIENT)
Age: 62
End: 2022-04-01

## 2022-04-01 LAB
ANION GAP SERPL CALC-SCNC: 5 MMOL/L — SIGNIFICANT CHANGE UP (ref 5–17)
BASOPHILS # BLD AUTO: 0.03 K/UL — SIGNIFICANT CHANGE UP (ref 0–0.2)
BASOPHILS NFR BLD AUTO: 0.5 % — SIGNIFICANT CHANGE UP (ref 0–2)
BUN SERPL-MCNC: 19 MG/DL — SIGNIFICANT CHANGE UP (ref 7–23)
CALCIUM SERPL-MCNC: 9.3 MG/DL — SIGNIFICANT CHANGE UP (ref 8.5–10.1)
CHLORIDE SERPL-SCNC: 111 MMOL/L — HIGH (ref 96–108)
CO2 SERPL-SCNC: 25 MMOL/L — SIGNIFICANT CHANGE UP (ref 22–31)
CREAT SERPL-MCNC: 0.74 MG/DL — SIGNIFICANT CHANGE UP (ref 0.5–1.3)
EGFR: 103 ML/MIN/1.73M2 — SIGNIFICANT CHANGE UP
EOSINOPHIL # BLD AUTO: 0.07 K/UL — SIGNIFICANT CHANGE UP (ref 0–0.5)
EOSINOPHIL NFR BLD AUTO: 1.3 % — SIGNIFICANT CHANGE UP (ref 0–6)
GLUCOSE BLDC GLUCOMTR-MCNC: 109 MG/DL — HIGH (ref 70–99)
GLUCOSE BLDC GLUCOMTR-MCNC: 93 MG/DL — SIGNIFICANT CHANGE UP (ref 70–99)
GLUCOSE SERPL-MCNC: 94 MG/DL — SIGNIFICANT CHANGE UP (ref 70–99)
HCT VFR BLD CALC: 35.6 % — LOW (ref 39–50)
HGB BLD-MCNC: 11.8 G/DL — LOW (ref 13–17)
IMM GRANULOCYTES NFR BLD AUTO: 0.5 % — SIGNIFICANT CHANGE UP (ref 0–1.5)
LYMPHOCYTES # BLD AUTO: 1.44 K/UL — SIGNIFICANT CHANGE UP (ref 1–3.3)
LYMPHOCYTES # BLD AUTO: 26.2 % — SIGNIFICANT CHANGE UP (ref 13–44)
MCHC RBC-ENTMCNC: 27.1 PG — SIGNIFICANT CHANGE UP (ref 27–34)
MCHC RBC-ENTMCNC: 33.1 GM/DL — SIGNIFICANT CHANGE UP (ref 32–36)
MCV RBC AUTO: 81.8 FL — SIGNIFICANT CHANGE UP (ref 80–100)
MONOCYTES # BLD AUTO: 0.46 K/UL — SIGNIFICANT CHANGE UP (ref 0–0.9)
MONOCYTES NFR BLD AUTO: 8.4 % — SIGNIFICANT CHANGE UP (ref 2–14)
NEUTROPHILS # BLD AUTO: 3.46 K/UL — SIGNIFICANT CHANGE UP (ref 1.8–7.4)
NEUTROPHILS NFR BLD AUTO: 63.1 % — SIGNIFICANT CHANGE UP (ref 43–77)
PLATELET # BLD AUTO: 218 K/UL — SIGNIFICANT CHANGE UP (ref 150–400)
POTASSIUM SERPL-MCNC: 4.1 MMOL/L — SIGNIFICANT CHANGE UP (ref 3.5–5.3)
POTASSIUM SERPL-SCNC: 4.1 MMOL/L — SIGNIFICANT CHANGE UP (ref 3.5–5.3)
RBC # BLD: 4.35 M/UL — SIGNIFICANT CHANGE UP (ref 4.2–5.8)
RBC # FLD: 14.1 % — SIGNIFICANT CHANGE UP (ref 10.3–14.5)
SODIUM SERPL-SCNC: 141 MMOL/L — SIGNIFICANT CHANGE UP (ref 135–145)
WBC # BLD: 5.49 K/UL — SIGNIFICANT CHANGE UP (ref 3.8–10.5)
WBC # FLD AUTO: 5.49 K/UL — SIGNIFICANT CHANGE UP (ref 3.8–10.5)

## 2022-04-01 PROCEDURE — 88311 DECALCIFY TISSUE: CPT | Mod: 26

## 2022-04-01 PROCEDURE — 99232 SBSQ HOSP IP/OBS MODERATE 35: CPT

## 2022-04-01 PROCEDURE — 73630 X-RAY EXAM OF FOOT: CPT | Mod: 26,RT

## 2022-04-01 PROCEDURE — 88305 TISSUE EXAM BY PATHOLOGIST: CPT | Mod: 26

## 2022-04-01 RX ORDER — FENTANYL CITRATE 50 UG/ML
50 INJECTION INTRAVENOUS
Refills: 0 | Status: DISCONTINUED | OUTPATIENT
Start: 2022-04-01 | End: 2022-04-01

## 2022-04-01 RX ORDER — ONDANSETRON 8 MG/1
4 TABLET, FILM COATED ORAL ONCE
Refills: 0 | Status: DISCONTINUED | OUTPATIENT
Start: 2022-04-01 | End: 2022-04-01

## 2022-04-01 RX ORDER — SODIUM CHLORIDE 9 MG/ML
1000 INJECTION, SOLUTION INTRAVENOUS
Refills: 0 | Status: DISCONTINUED | OUTPATIENT
Start: 2022-04-01 | End: 2022-04-01

## 2022-04-01 RX ORDER — OXYCODONE HYDROCHLORIDE 5 MG/1
10 TABLET ORAL ONCE
Refills: 0 | Status: DISCONTINUED | OUTPATIENT
Start: 2022-04-01 | End: 2022-04-01

## 2022-04-01 RX ADMIN — Medication 100 MILLIGRAM(S): at 10:02

## 2022-04-01 RX ADMIN — Medication 0.5 MILLIGRAM(S): at 21:44

## 2022-04-01 RX ADMIN — Medication 500 MILLIGRAM(S): at 10:03

## 2022-04-01 RX ADMIN — LISINOPRIL 40 MILLIGRAM(S): 2.5 TABLET ORAL at 10:02

## 2022-04-01 RX ADMIN — Medication 5 MILLIGRAM(S): at 21:45

## 2022-04-01 RX ADMIN — AMLODIPINE BESYLATE 5 MILLIGRAM(S): 2.5 TABLET ORAL at 10:03

## 2022-04-01 RX ADMIN — Medication 100 MILLIGRAM(S): at 21:44

## 2022-04-01 RX ADMIN — INSULIN GLARGINE 20 UNIT(S): 100 INJECTION, SOLUTION SUBCUTANEOUS at 21:45

## 2022-04-01 RX ADMIN — CEFEPIME 100 MILLIGRAM(S): 1 INJECTION, POWDER, FOR SOLUTION INTRAMUSCULAR; INTRAVENOUS at 10:03

## 2022-04-01 RX ADMIN — CEFEPIME 100 MILLIGRAM(S): 1 INJECTION, POWDER, FOR SOLUTION INTRAMUSCULAR; INTRAVENOUS at 21:44

## 2022-04-01 RX ADMIN — SODIUM CHLORIDE 75 MILLILITER(S): 9 INJECTION, SOLUTION INTRAVENOUS at 17:10

## 2022-04-01 NOTE — CHART NOTE - NSCHARTNOTEFT_GEN_A_CORE
HPI: Patient is a 62y old  Male who presents with a chief complaint of Right foot ulcer, well known to podiatry seen here by team on patients last visit. Pt was d/c'd on 3/19 and failed outpatient antibiotic therapy and conservative. Now requiring ampuation of Rt Great toe.    INTERVAL HPI/OVERNIGHT EVENTS:   Pt is scheduled for Or with Dr. Griffith. Patient is aware of procedure and is NPO since midnight.    MEDICATIONS  (STANDING):  amLODIPine   Tablet 5 milliGRAM(s) Oral daily  ascorbic acid 500 milliGRAM(s) Oral daily  cefepime   IVPB 2000 milliGRAM(s) IV Intermittent every 12 hours  dextrose 5%. 1000 milliLiter(s) (100 mL/Hr) IV Continuous <Continuous>  dextrose 5%. 1000 milliLiter(s) (50 mL/Hr) IV Continuous <Continuous>  dextrose 50% Injectable 25 Gram(s) IV Push once  dextrose 50% Injectable 12.5 Gram(s) IV Push once  dextrose 50% Injectable 25 Gram(s) IV Push once  doxycycline hyclate Capsule 100 milliGRAM(s) Oral every 12 hours  glucagon  Injectable 1 milliGRAM(s) IntraMuscular once  influenza   Vaccine 0.5 milliLiter(s) IntraMuscular once  insulin glargine Injectable (LANTUS) 20 Unit(s) SubCutaneous at bedtime  insulin lispro (ADMELOG) corrective regimen sliding scale   SubCutaneous three times a day before meals  insulin lispro (ADMELOG) corrective regimen sliding scale   SubCutaneous at bedtime  lisinopril 40 milliGRAM(s) Oral daily  melatonin 5 milliGRAM(s) Oral at bedtime  sodium chloride 0.9%. 1000 milliLiter(s) (75 mL/Hr) IV Continuous <Continuous>    MEDICATIONS  (PRN):  acetaminophen     Tablet .. 650 milliGRAM(s) Oral every 6 hours PRN Temp greater or equal to 38C (100.4F), Mild Pain (1 - 3)  ALPRAZolam 0.5 milliGRAM(s) Oral every 12 hours PRN anxiety  aluminum hydroxide/magnesium hydroxide/simethicone Suspension 30 milliLiter(s) Oral every 4 hours PRN Dyspepsia  dextrose Oral Gel 15 Gram(s) Oral once PRN Blood Glucose LESS THAN 70 milliGRAM(s)/deciliter  morphine  - Injectable 2 milliGRAM(s) IV Push every 4 hours PRN Moderate Pain (4 - 6)  ondansetron Injectable 4 milliGRAM(s) IV Push every 8 hours PRN Nausea and/or Vomiting      Allergies    No Known Allergies    Intolerances        Vital Signs Last 24 Hrs  T(C): 36.9 (31 Mar 2022 22:34), Max: 36.9 (31 Mar 2022 22:34)  T(F): 98.4 (31 Mar 2022 22:34), Max: 98.4 (31 Mar 2022 22:34)  HR: 90 (31 Mar 2022 22:34) (90 - 102)  BP: 138/71 (31 Mar 2022 22:34) (136/76 - 153/77)  BP(mean): 94 (31 Mar 2022 19:41) (94 - 112)  RR: 18 (31 Mar 2022 22:34) (17 - 18)  SpO2: 94% (31 Mar 2022 22:34) (94% - 100%)    I&O's Detail    31 Mar 2022 07:01  -  01 Apr 2022 07:00  --------------------------------------------------------  IN:    sodium chloride 0.9%: 750 mL  Total IN: 750 mL    OUT:  Total OUT: 0 mL    Total NET: 750 mL          LABS:                        12.3   7.95  )-----------( 277      ( 31 Mar 2022 16:34 )             37.5     03-31    139  |  108  |  17  ----------------------------<  140<H>  4.1   |  25  |  0.77    Ca    9.7      31 Mar 2022 16:34    TPro  7.9  /  Alb  3.2<L>  /  TBili  0.5  /  DBili  x   /  AST  17  /  ALT  32  /  AlkPhos  60  03-31    PT/INR - ( 31 Mar 2022 16:34 )   PT: 12.8 sec;   INR: 1.10 ratio         PTT - ( 31 Mar 2022 16:34 )  PTT:33.7 sec    CAPILLARY BLOOD GLUCOSE      POCT Blood Glucose.: 93 mg/dL (01 Apr 2022 05:48)  POCT Blood Glucose.: 178 mg/dL (31 Mar 2022 23:14)  POCT Blood Glucose.: 96 mg/dL (31 Mar 2022 19:29)      RADIOLOGY & ADDITIONAL TESTS:    Imaging Personally Reviewed:  [ ] YES  [ ] NO    Consultant(s) Notes Reviewed:  [ ] YES  [ ] NO    Care Discussed with Consultants/Other Providers [ ] YES  [ ] NO        Plan:   To OR today 4/1/22 with Dr. Griffith    CXR on sunrise.  EKG on sunrise.  Medical clearance documented in chart.  Consent in progress   Procedure was explained to patient in detail. All alternatives, risks and complications were discussed. All questions answered.

## 2022-04-01 NOTE — DIETITIAN INITIAL EVALUATION ADULT. - ENTER FROM (G/KG)
Please notify .  Ma'am and ultrasound read as suspicious.  Lesion of concern is very subtle seen on only 1 view.  Previous MRI was negative 2/23/2021.  Please see me for follow-up examination and review to discuss options 1.4

## 2022-04-01 NOTE — DIETITIAN INITIAL EVALUATION ADULT. - PERTINENT MEDS FT
MEDICATIONS  (STANDING):  amLODIPine   Tablet 5 milliGRAM(s) Oral daily  ascorbic acid 500 milliGRAM(s) Oral daily  cefepime   IVPB 2000 milliGRAM(s) IV Intermittent every 12 hours  dextrose 5%. 1000 milliLiter(s) (100 mL/Hr) IV Continuous <Continuous>  dextrose 5%. 1000 milliLiter(s) (50 mL/Hr) IV Continuous <Continuous>  dextrose 50% Injectable 25 Gram(s) IV Push once  dextrose 50% Injectable 12.5 Gram(s) IV Push once  dextrose 50% Injectable 25 Gram(s) IV Push once  doxycycline hyclate Capsule 100 milliGRAM(s) Oral every 12 hours  glucagon  Injectable 1 milliGRAM(s) IntraMuscular once  influenza   Vaccine 0.5 milliLiter(s) IntraMuscular once  insulin glargine Injectable (LANTUS) 20 Unit(s) SubCutaneous at bedtime  insulin lispro (ADMELOG) corrective regimen sliding scale   SubCutaneous three times a day before meals  insulin lispro (ADMELOG) corrective regimen sliding scale   SubCutaneous at bedtime  lisinopril 40 milliGRAM(s) Oral daily  melatonin 5 milliGRAM(s) Oral at bedtime  sodium chloride 0.9%. 1000 milliLiter(s) (75 mL/Hr) IV Continuous <Continuous>    MEDICATIONS  (PRN):  acetaminophen     Tablet .. 650 milliGRAM(s) Oral every 6 hours PRN Temp greater or equal to 38C (100.4F), Mild Pain (1 - 3)  ALPRAZolam 0.5 milliGRAM(s) Oral every 12 hours PRN anxiety  aluminum hydroxide/magnesium hydroxide/simethicone Suspension 30 milliLiter(s) Oral every 4 hours PRN Dyspepsia  dextrose Oral Gel 15 Gram(s) Oral once PRN Blood Glucose LESS THAN 70 milliGRAM(s)/deciliter  morphine  - Injectable 2 milliGRAM(s) IV Push every 4 hours PRN Moderate Pain (4 - 6)  ondansetron Injectable 4 milliGRAM(s) IV Push every 8 hours PRN Nausea and/or Vomiting

## 2022-04-01 NOTE — DIETITIAN INITIAL EVALUATION ADULT. - ADD RECOMMEND
1. Advance diet to Consistent CHO as tolerated when medically feasible 2. MVI w/ minerals daily to ensure 100% RDA met 3. Monitor bowel movements, if no BM for >3 days, consider implementing bowel regimen. RDN will continue to monitor PO intake, labs, hydration, and wt prn.

## 2022-04-01 NOTE — DIETITIAN INITIAL EVALUATION ADULT. - ORAL INTAKE PTA/DIET HISTORY
Pt reports good rosa/po intake PTA; follows diabetic diet until dinner, where wife prepares food and has sugary snacks, etc. nonadherent to diet. Drinks Glucerna one/day sometimes. Works as , lives w/wife and dtr, grandson. "sugar,ETOH addict"

## 2022-04-01 NOTE — CONSULT NOTE ADULT - SUBJECTIVE AND OBJECTIVE BOX
Patient is a 62y old  Male who presents with a chief complaint of Right foot ulcer     HPI:  62 y/o male with h/o HTN, DM2, Renal CA (in remission) s/p L nephrectomy, R first metatarsal non-healing plantar ulceration. Right foot cellulitis with probable underlying acute first MTP septic arthritis on cefepime 2 gm IV q12h via PICC line and doxycycline 100 mg PO q12h (since 3/16) was admitted on for nonhealing right 1st digit ulcer. Dr. Griffith evaluated the pt and advised the pt to come to  for admisson and further surgery. Reports swelling of the right leg with ulcer of the 1st toe. Denies fevers, chills at home. In ER cefepime and doxycycline were continued.    Prior admission:   - 3/14/2022-3/18/2022: Right distal hallux foot ulcer/osteomyelitis -> wash out and drainage, on Cefepime and doxycycline BID for 6 weeks via PICC. Culture positive for Streptococcus constellatus.       PMH: as above  PSH: as above  Meds: per reconciliation sheet, noted below  MEDICATIONS  (STANDING):  amLODIPine   Tablet 5 milliGRAM(s) Oral daily  ascorbic acid 500 milliGRAM(s) Oral daily  cefepime   IVPB 2000 milliGRAM(s) IV Intermittent every 12 hours  dextrose 5%. 1000 milliLiter(s) (100 mL/Hr) IV Continuous <Continuous>  dextrose 5%. 1000 milliLiter(s) (50 mL/Hr) IV Continuous <Continuous>  dextrose 50% Injectable 25 Gram(s) IV Push once  dextrose 50% Injectable 12.5 Gram(s) IV Push once  dextrose 50% Injectable 25 Gram(s) IV Push once  doxycycline hyclate Capsule 100 milliGRAM(s) Oral every 12 hours  glucagon  Injectable 1 milliGRAM(s) IntraMuscular once  influenza   Vaccine 0.5 milliLiter(s) IntraMuscular once  insulin glargine Injectable (LANTUS) 20 Unit(s) SubCutaneous at bedtime  insulin lispro (ADMELOG) corrective regimen sliding scale   SubCutaneous three times a day before meals  insulin lispro (ADMELOG) corrective regimen sliding scale   SubCutaneous at bedtime  lisinopril 40 milliGRAM(s) Oral daily  melatonin 5 milliGRAM(s) Oral at bedtime  sodium chloride 0.9%. 1000 milliLiter(s) (75 mL/Hr) IV Continuous <Continuous>    MEDICATIONS  (PRN):  acetaminophen     Tablet .. 650 milliGRAM(s) Oral every 6 hours PRN Temp greater or equal to 38C (100.4F), Mild Pain (1 - 3)  ALPRAZolam 0.5 milliGRAM(s) Oral every 12 hours PRN anxiety  aluminum hydroxide/magnesium hydroxide/simethicone Suspension 30 milliLiter(s) Oral every 4 hours PRN Dyspepsia  dextrose Oral Gel 15 Gram(s) Oral once PRN Blood Glucose LESS THAN 70 milliGRAM(s)/deciliter  morphine  - Injectable 2 milliGRAM(s) IV Push every 4 hours PRN Moderate Pain (4 - 6)  ondansetron Injectable 4 milliGRAM(s) IV Push every 8 hours PRN Nausea and/or Vomiting    Allergies    No Known Allergies    Intolerances      Social: no smoking, no alcohol, no illegal drugs; no recent travel, no exposure to TB  FAMILY HISTORY:  FH: diabetes mellitus (Father, Mother)    FH: heart disease (Father, Mother)      no history of premature cardiovascular disease in first degree relatives    ROS: the patient denies fever, no chills, no HA, no seizures, no dizziness, no sore throat, no nasal congestion, no blurry vision, no CP, no palpitations, no SOB, no cough, no abdominal pain, no diarrhea, no N/V, no dysuria, no leg pain, no claudication, has right great toe ulcer, no joint aches, no rectal pain or bleeding, no night sweats  All other systems reviewed and are negative    Vital Signs Last 24 Hrs  T(C): 36.5 (01 Apr 2022 07:55), Max: 36.9 (31 Mar 2022 22:34)  T(F): 97.7 (01 Apr 2022 07:55), Max: 98.4 (31 Mar 2022 22:34)  HR: 79 (01 Apr 2022 07:55) (79 - 102)  BP: 149/65 (01 Apr 2022 07:55) (136/76 - 153/77)  BP(mean): 94 (31 Mar 2022 19:41) (94 - 112)  RR: 18 (01 Apr 2022 07:55) (17 - 18)  SpO2: 96% (01 Apr 2022 07:55) (94% - 100%)  Daily Height in cm: 180.34 (31 Mar 2022 22:34)    Daily     PE:    Constitutional:  No acute distress  HEENT: NC/AT, EOMI, PERRLA, conjunctivae clear; ears and nose atraumatic; pharynx benign  Neck: supple; thyroid not palpable  Back: no tenderness  Respiratory: respiratory effort normal; clear to auscultation  Cardiovascular: S1S2 regular, no murmurs  Abdomen: soft, not tender, not distended, positive BS; no liver or spleen organomegaly  Genitourinary: no suprapubic tenderness  Lymphatic: no LN palpable  Musculoskeletal: no muscle tenderness, no joint swelling or tenderness  Extremities: no pedal edema  Right grat toe plantar ulcer  Neurological/ Psychiatric: AxOx3, judgement and insight normal; moving all extremities  Skin: no rashes; no palpable lesions    Labs: all available labs reviewed                        11.8   5.49  )-----------( 218      ( 01 Apr 2022 07:03 )             35.6     04-01    141  |  111<H>  |  19  ----------------------------<  94  4.1   |  25  |  0.74    Ca    9.3      01 Apr 2022 07:03    TPro  7.9  /  Alb  3.2<L>  /  TBili  0.5  /  DBili  x   /  AST  17  /  ALT  32  /  AlkPhos  60  03-31     LIVER FUNCTIONS - ( 31 Mar 2022 16:34 )  Alb: 3.2 g/dL / Pro: 7.9 gm/dL / ALK PHOS: 60 U/L / ALT: 32 U/L / AST: 17 U/L / GGT: x           COVID-19 PCR: NotDetec (03-31-22 @ 16:34)    Radiology: all available radiological tests reviewed    < from: Xray Foot AP + Lateral + Oblique, Right (03.31.22 @ 16:51) >  Fracture/cortical destruction in the hallux, patient scheduled for amputation  < end of copied text >      Advanced directives addressed: full resuscitation Patient is a 62y old  Male who presents with a chief complaint of Right foot ulcer     HPI:  60 y/o male with h/o HTN, DM2, Renal CA (in remission) s/p L nephrectomy, R first metatarsal non-healing plantar ulceration. Right foot cellulitis with probable underlying acute first MTP septic arthritis on cefepime 2 gm IV q12h via PICC line and doxycycline 100 mg PO q12h (since 3/16) was admitted on for nonhealing right 1st digit ulcer. Dr. Griffith evaluated the pt and advised the pt to come to  for admisson and further surgery. Reports swelling of the right leg with ulcer of the 1st toe. Denies fevers, chills at home. In ER cefepime and doxycycline were continued.    Prior admission:   - 3/14/2022-3/18/2022: Right distal hallux foot ulcer/osteomyelitis -> wash out and drainage, on Cefepime and doxycycline BID for 6 weeks via PICC. Culture positive for Streptococcus constellatus.       PMH: as above  PSH: as above  Meds: per reconciliation sheet, noted below  MEDICATIONS  (STANDING):  amLODIPine   Tablet 5 milliGRAM(s) Oral daily  ascorbic acid 500 milliGRAM(s) Oral daily  cefepime   IVPB 2000 milliGRAM(s) IV Intermittent every 12 hours  dextrose 5%. 1000 milliLiter(s) (100 mL/Hr) IV Continuous <Continuous>  dextrose 5%. 1000 milliLiter(s) (50 mL/Hr) IV Continuous <Continuous>  dextrose 50% Injectable 25 Gram(s) IV Push once  dextrose 50% Injectable 12.5 Gram(s) IV Push once  dextrose 50% Injectable 25 Gram(s) IV Push once  doxycycline hyclate Capsule 100 milliGRAM(s) Oral every 12 hours  glucagon  Injectable 1 milliGRAM(s) IntraMuscular once  influenza   Vaccine 0.5 milliLiter(s) IntraMuscular once  insulin glargine Injectable (LANTUS) 20 Unit(s) SubCutaneous at bedtime  insulin lispro (ADMELOG) corrective regimen sliding scale   SubCutaneous three times a day before meals  insulin lispro (ADMELOG) corrective regimen sliding scale   SubCutaneous at bedtime  lisinopril 40 milliGRAM(s) Oral daily  melatonin 5 milliGRAM(s) Oral at bedtime  sodium chloride 0.9%. 1000 milliLiter(s) (75 mL/Hr) IV Continuous <Continuous>    MEDICATIONS  (PRN):  acetaminophen     Tablet .. 650 milliGRAM(s) Oral every 6 hours PRN Temp greater or equal to 38C (100.4F), Mild Pain (1 - 3)  ALPRAZolam 0.5 milliGRAM(s) Oral every 12 hours PRN anxiety  aluminum hydroxide/magnesium hydroxide/simethicone Suspension 30 milliLiter(s) Oral every 4 hours PRN Dyspepsia  dextrose Oral Gel 15 Gram(s) Oral once PRN Blood Glucose LESS THAN 70 milliGRAM(s)/deciliter  morphine  - Injectable 2 milliGRAM(s) IV Push every 4 hours PRN Moderate Pain (4 - 6)  ondansetron Injectable 4 milliGRAM(s) IV Push every 8 hours PRN Nausea and/or Vomiting    Allergies    No Known Allergies    Intolerances      Social: no smoking, no alcohol, no illegal drugs; no recent travel, no exposure to TB  FAMILY HISTORY:  FH: diabetes mellitus (Father, Mother)    FH: heart disease (Father, Mother)      no history of premature cardiovascular disease in first degree relatives    ROS: the patient denies fever, no chills, no HA, no seizures, no dizziness, no sore throat, no nasal congestion, no blurry vision, no CP, no palpitations, no SOB, no cough, no abdominal pain, no diarrhea, no N/V, no dysuria, no leg pain, no claudication, has right great toe ulcer, no joint aches, no rectal pain or bleeding, no night sweats  All other systems reviewed and are negative    Vital Signs Last 24 Hrs  T(C): 36.5 (01 Apr 2022 07:55), Max: 36.9 (31 Mar 2022 22:34)  T(F): 97.7 (01 Apr 2022 07:55), Max: 98.4 (31 Mar 2022 22:34)  HR: 79 (01 Apr 2022 07:55) (79 - 102)  BP: 149/65 (01 Apr 2022 07:55) (136/76 - 153/77)  BP(mean): 94 (31 Mar 2022 19:41) (94 - 112)  RR: 18 (01 Apr 2022 07:55) (17 - 18)  SpO2: 96% (01 Apr 2022 07:55) (94% - 100%)  Daily Height in cm: 180.34 (31 Mar 2022 22:34)    Daily     PE:    Constitutional:  No acute distress  HEENT: NC/AT, EOMI, PERRLA, conjunctivae clear; ears and nose atraumatic; pharynx benign  Neck: supple; thyroid not palpable  Back: no tenderness  Respiratory: respiratory effort normal; clear to auscultation  Cardiovascular: S1S2 regular, no murmurs  Abdomen: soft, not tender, not distended, positive BS; no liver or spleen organomegaly  Genitourinary: no suprapubic tenderness  Lymphatic: no LN palpable  Musculoskeletal: no muscle tenderness, no joint swelling or tenderness  Extremities: no pedal edema  Right great toe plantar ulcer; dressing  Neurological/ Psychiatric: AxOx3, judgement and insight normal; moving all extremities  Skin: no rashes; no palpable lesions    Labs: all available labs reviewed                        11.8   5.49  )-----------( 218      ( 01 Apr 2022 07:03 )             35.6     04-01    141  |  111<H>  |  19  ----------------------------<  94  4.1   |  25  |  0.74    Ca    9.3      01 Apr 2022 07:03    TPro  7.9  /  Alb  3.2<L>  /  TBili  0.5  /  DBili  x   /  AST  17  /  ALT  32  /  AlkPhos  60  03-31     LIVER FUNCTIONS - ( 31 Mar 2022 16:34 )  Alb: 3.2 g/dL / Pro: 7.9 gm/dL / ALK PHOS: 60 U/L / ALT: 32 U/L / AST: 17 U/L / GGT: x           COVID-19 PCR: NotDetec (03-31-22 @ 16:34)    Radiology: all available radiological tests reviewed    < from: Xray Foot AP + Lateral + Oblique, Right (03.31.22 @ 16:51) >  Fracture/cortical destruction in the hallux, patient scheduled for amputation  < end of copied text >      Advanced directives addressed: full resuscitation

## 2022-04-01 NOTE — CONSULT NOTE ADULT - ASSESSMENT
62 y/o male with h/o HTN, DM2, Renal CA (in remission) s/p L nephrectomy, R first metatarsal non-healing plantar ulceration. Right foot cellulitis with probable underlying acute first MTP septic arthritis on cefepime 2 gm IV q12h via PICC line and doxycycline 100 mg PO q12h (since 3/16) was admitted on for nonhealing right 1st digit ulcer. Dr. Griffith evaluated the pt and advised the pt to come to  for admisson and further surgery. Reports swelling of the right leg with ulcer of the 1st toe. Denies fevers, chills at home. In ER cefepime and doxycycline were continued.    1. Right first metatarsal non-healing plantar ulceration. Right foot cellulitis with probable underlying acute first MTP septic arthritis s/p I and D on IV and PO abx therapy. Renal Ca s/p nephrectomy  -foot ulcer reported with no improvement despite abx therapy  -prior MRI noted  - on doxycycline 100 mg PO q12h and cefepime 2 gm IV q12h since 3/16  -tolerating abx well so far; no side effects noted  -local wound care per podiatry   -PICC line in place  -plan for further surgery ?toe amputation  -continue with IV abx coverage  -podiatry consult appreciated  -monitor temps  -f/u CBC  -supportive care  2. Other issues:   -care per medicine 60 y/o male with h/o HTN, DM2, Renal CA (in remission) s/p L nephrectomy, R first metatarsal non-healing plantar ulceration. Right foot cellulitis with probable underlying acute first MTP septic arthritis on cefepime 2 gm IV q12h via PICC line and doxycycline 100 mg PO q12h (since 3/16) was admitted on for nonhealing right 1st digit ulcer. Dr. Griffith evaluated the pt and advised the pt to come to  for admisson and further surgery. Reports swelling of the right leg with ulcer of the 1st toe. Denies fevers, chills at home. In ER cefepime and doxycycline were continued.    1. Right first metatarsal non-healing plantar ulceration. Right foot cellulitis with probable underlying acute first MTP septic arthritis s/p I and D on IV and PO abx therapy. Renal Ca s/p nephrectomy  -foot ulcer reported with no improvement despite abx therapy  -prior MRI noted  - on doxycycline 100 mg PO q12h and cefepime 2 gm IV q12h since 3/16  -tolerating abx well so far; no side effects noted  -local wound care per podiatry   -PICC line in place  -plan for further surgery with toe amputation  -continue with IV abx coverage  -podiatry consult appreciated  -monitor temps  -f/u CBC  -supportive care  2. Other issues:   -care per medicine

## 2022-04-01 NOTE — DIETITIAN INITIAL EVALUATION ADULT. - OTHER INFO
61 y/o M with PMH Type 2 DM, adrenal adneoma, renal carcinoma s/p partial nephrectomy in remission, anxiety, HTN presents with right 1st digit ulcer. Pt had a right 1st digit infection and was d/c from  on Cefepime with a PICC line. Dr. Griffith evaluated the pt and advised the pt to come to  for admisson and surgical procedure in the ER as hallux is not salvageable. Reports redness at insulin injection sites and swelling of the right leg with ulcer of the 1st toe. Denies headaches, blurry vision, dizziness, fevers, chills, chest pain, SOB, abdominal pain, N/V, diarrhea/constipation. No chest pain or SOB with exertion. Able to climb a flight of stairs without chest pain/SOB.    Previous RD note 3/18/22: UBW: 240# then intentional wt loss 2/2 DM dx, bed wt 4/1/22: 197#,  IBW: 172#. NFPE reveals mild muscle/fat wasting most likely d/t ETOH abuse, smoking. Pt NPPO then Consistent CHO diet, rec continue.  Diet education provided.   See below recommendations.

## 2022-04-02 ENCOUNTER — TRANSCRIPTION ENCOUNTER (OUTPATIENT)
Age: 62
End: 2022-04-02

## 2022-04-02 VITALS
OXYGEN SATURATION: 99 % | SYSTOLIC BLOOD PRESSURE: 154 MMHG | HEART RATE: 101 BPM | DIASTOLIC BLOOD PRESSURE: 70 MMHG | RESPIRATION RATE: 17 BRPM | TEMPERATURE: 100 F

## 2022-04-02 LAB
ANION GAP SERPL CALC-SCNC: 3 MMOL/L — LOW (ref 5–17)
BUN SERPL-MCNC: 11 MG/DL — SIGNIFICANT CHANGE UP (ref 7–23)
CALCIUM SERPL-MCNC: 8.9 MG/DL — SIGNIFICANT CHANGE UP (ref 8.5–10.1)
CHLORIDE SERPL-SCNC: 110 MMOL/L — HIGH (ref 96–108)
CO2 SERPL-SCNC: 27 MMOL/L — SIGNIFICANT CHANGE UP (ref 22–31)
CREAT SERPL-MCNC: 0.78 MG/DL — SIGNIFICANT CHANGE UP (ref 0.5–1.3)
EGFR: 101 ML/MIN/1.73M2 — SIGNIFICANT CHANGE UP
GLUCOSE SERPL-MCNC: 162 MG/DL — HIGH (ref 70–99)
GRAM STN FLD: SIGNIFICANT CHANGE UP
HCT VFR BLD CALC: 34.1 % — LOW (ref 39–50)
HGB BLD-MCNC: 11.2 G/DL — LOW (ref 13–17)
MCHC RBC-ENTMCNC: 27.3 PG — SIGNIFICANT CHANGE UP (ref 27–34)
MCHC RBC-ENTMCNC: 32.8 GM/DL — SIGNIFICANT CHANGE UP (ref 32–36)
MCV RBC AUTO: 83 FL — SIGNIFICANT CHANGE UP (ref 80–100)
PLATELET # BLD AUTO: 215 K/UL — SIGNIFICANT CHANGE UP (ref 150–400)
POTASSIUM SERPL-MCNC: 4.3 MMOL/L — SIGNIFICANT CHANGE UP (ref 3.5–5.3)
POTASSIUM SERPL-SCNC: 4.3 MMOL/L — SIGNIFICANT CHANGE UP (ref 3.5–5.3)
RBC # BLD: 4.11 M/UL — LOW (ref 4.2–5.8)
RBC # FLD: 13.9 % — SIGNIFICANT CHANGE UP (ref 10.3–14.5)
SODIUM SERPL-SCNC: 140 MMOL/L — SIGNIFICANT CHANGE UP (ref 135–145)
SPECIMEN SOURCE: SIGNIFICANT CHANGE UP
WBC # BLD: 7.03 K/UL — SIGNIFICANT CHANGE UP (ref 3.8–10.5)
WBC # FLD AUTO: 7.03 K/UL — SIGNIFICANT CHANGE UP (ref 3.8–10.5)

## 2022-04-02 PROCEDURE — 99239 HOSP IP/OBS DSCHRG MGMT >30: CPT

## 2022-04-02 RX ORDER — ACETAMINOPHEN 500 MG
2 TABLET ORAL
Qty: 0 | Refills: 0 | DISCHARGE
Start: 2022-04-02

## 2022-04-02 RX ORDER — ENOXAPARIN SODIUM 100 MG/ML
40 INJECTION SUBCUTANEOUS EVERY 24 HOURS
Refills: 0 | Status: DISCONTINUED | OUTPATIENT
Start: 2022-04-02 | End: 2022-04-02

## 2022-04-02 RX ADMIN — Medication 100 MILLIGRAM(S): at 09:52

## 2022-04-02 RX ADMIN — SODIUM CHLORIDE 75 MILLILITER(S): 9 INJECTION INTRAMUSCULAR; INTRAVENOUS; SUBCUTANEOUS at 05:07

## 2022-04-02 RX ADMIN — CEFEPIME 100 MILLIGRAM(S): 1 INJECTION, POWDER, FOR SOLUTION INTRAMUSCULAR; INTRAVENOUS at 09:51

## 2022-04-02 RX ADMIN — LISINOPRIL 40 MILLIGRAM(S): 2.5 TABLET ORAL at 09:51

## 2022-04-02 RX ADMIN — Medication 650 MILLIGRAM(S): at 09:52

## 2022-04-02 RX ADMIN — Medication 6: at 12:27

## 2022-04-02 RX ADMIN — AMLODIPINE BESYLATE 5 MILLIGRAM(S): 2.5 TABLET ORAL at 09:52

## 2022-04-02 RX ADMIN — Medication 650 MILLIGRAM(S): at 16:01

## 2022-04-02 RX ADMIN — Medication 500 MILLIGRAM(S): at 09:52

## 2022-04-02 NOTE — DISCHARGE NOTE PROVIDER - CARE PROVIDER_API CALL
Osvaldo Perdue)  Hematology; Internal Medicine  [address]  [place] [address]  Phone: [phone]  Fax: [phone]  Follow Up Time:     Francisco Griffith (CLAIRE)  Podiatric Medicine and Surgery  [address] [place] [address]  Phone: [phone]  Fax: [phone]  Follow Up Time:

## 2022-04-02 NOTE — PROGRESS NOTE ADULT - ASSESSMENT
Assesment: 62 y/o male see inpatient for the following   -Partial first Ray amputation for management of OM of Right great toe   -Non- Healing Full thickness Right proximal phalanx plantar ulceration, clinically infected   -OM of Rt proximal phalanx.   -Diabetes Mellitus type 2  -Pain in Right Foot   -Cellulitis of Right foot  -Difficulty with ambulation    Plan:   Chart reviewed and Patient evaluated;    D/W pt that after failed attempt at conservative therapy pt will require amputation of his right hallux  Pt demonstrated verbal understanding  Pt is POD 1 s/p partial first ray amp. with podiatry   Dressed with xeroform and DSD dressings and light to right foot  please keep post op dressing intact and reinforce as need if drainage occurs   X-rays findings noted ove   Previous MRI results noted above.   Previous CHRISSY/PVR findings noted above   Previous Rt Foot wound cultures findings noted above     ID recs appreciated. Podiatry Would rec. additional 2 weeks of IV abx therapy  All additional care per medicine. appreciated   Case D/W Dr. Griffith   Podiatry will monitor progression post operatively   Podiatry will follow while in house.
60 y/o male with h/o HTN, DM2, Renal CA (in remission) s/p L nephrectomy, R first metatarsal non-healing plantar ulceration. Right foot cellulitis with probable underlying acute first MTP septic arthritis on cefepime 2 gm IV q12h via PICC line and doxycycline 100 mg PO q12h (since 3/16) was admitted on for nonhealing right 1st digit ulcer. Dr. Griffith evaluated the pt and advised the pt to come to  for admisson and further surgery. Reports swelling of the right leg with ulcer of the 1st toe. Denies fevers, chills at home. In ER cefepime and doxycycline were continued.    1. Right first metatarsal non-healing plantar ulceration. Right foot cellulitis with probable underlying acute first MTP septic arthritis s/p I and D on IV and PO abx therapy. Renal Ca s/p nephrectomy  -foot ulcer reported with no improvement despite abx therapy  -prior MRI noted  - on doxycycline 100 mg PO q12h and cefepime 2 gm IV q12h since 3/16  -tolerating abx well so far; no side effects noted  -s/p toe amputation  -local wound care per podiatry   -PICC line in place  -continue with IV abx coverage for 2 more weeks  -reinstate IV abx for 2 more weeks; weekly labs  -podiatry consult appreciated  -monitor temps  -f/u CBC  -supportive care  2. Other issues:   -care per medicine
61 y/o M presents with right foot ulcer     1. Right 1st digit ulceration and osteomyelitis   - Admit to med/surg   - OR  for amputation of hallux   - WBAT, Pain control   - c/w abx - Cefepime and doxycycline   - IVF with NS at 75 ml/hr  - NPO after midnight    - Podiatry recs appreciated  - ID consult - Dr. Granger   - Patient is medically optimized for surgery. The patient is determined to be an average risk for complications following an average risk procedure. Benefits and risks of surgery discussed with pt at the bedside.    - Revised cardiac risk index for pre-operative risk = 1 points, class II risk -> 6.0% for a 30 day risk of death, MI, or cardiac arrest     2. Hypertension   - monitor closely   - c/w anti-HTN agents     3. Normocytic anemia   - monitor     4. Hyperglycemia in the setting of Type 2 DM   - Glucose 140, monitor   - Diabetic diet   - Pt on Toujeo 40 units daily, Alogliptin 25 mg daily, Metformin 500 mg 2 tabs BID, Repaglinide 2 mg TID   - Since pt will be NPO overnight, will give 20 units of Lantus and start moderate dose ISS   - After surgery, once diet is advanced will increase Lantus to 40 and add TIDAC insulin     5. Hypoalbuminemia   - Albumin 3.2  - Nutrition consult     6. History of Type 2 DM, adrenal adenoma renal carcinoma s/p partial nephrectomy in remission, anxiety, HTN  - c/w home medications   - Home medications verified at the bedside   - Pt with erythema surrounding insulin injection sites, please monitor for expansion of erythema/cellulitis     DVT ppx: SCDs (can restart pharmacological anticoagulation after surgery)   Code status: Full code (pt agrees to chest compressions and intubation if required).

## 2022-04-02 NOTE — PROGRESS NOTE ADULT - SUBJECTIVE AND OBJECTIVE BOX
63 y/o M with PMH Type 2 DM, adrenal adneoma, renal carcinoma s/p partial nephrectomy in remission, anxiety, HTN presents with right 1st digit ulcer. Pt had a right 1st digit infection and was d/c from  on Cefepime with a PICC line. Dr. Griffith evaluated the pt and advised the pt to come to  for admisson adn surgical procedure in the ER as hallux is not salvageable. Reports redness at insulin injection sites and swelling of the right leg with ulcer of the 1st toe. Denies headaches, blurry vision, dizziness, fevers, chills, chest pain, SOB, abdominal pain, N/V, diarrhea/constipation. No chest pain or SOB with exertion. Able to climb a flight of stairs without chest pain/SOB.     Prior admission:   - 3/14/2022-3/18/2022: Right distal hallux foot ulcer/osteomyelitis -> wash out and drainage, on Cefepime and doxycycline BID for 6 weeks via PICC. Culture positive for Streptococcus constellatus.     ER course: HR , //77. Labs: Hb 12.3, Glucose 140, Albumin 3.2. EKG: NSR with HR 97 bpm, normal intervals, no ST segment changes, no T wave inversions (personally reviewed).     Imaging:   - XR right foot: bone spurs, osteoarthritis, bony erosion 1st digit, no fracture/dislocation (personally reviewed).   - CXR: no consolidation, no effusion, no pneumothorax, gastric bubble (personally reviewed).     4/1- patient was seen and examined. denies pain, fever or chills. OOB ambulating. NPO for OR 4/1.    Vital Signs Last 24 Hrs  T(C): 36.5 (01 Apr 2022 07:55), Max: 36.9 (31 Mar 2022 22:34)  T(F): 97.7 (01 Apr 2022 07:55), Max: 98.4 (31 Mar 2022 22:34)  HR: 79 (01 Apr 2022 07:55) (79 - 102)  BP: 149/65 (01 Apr 2022 07:55) (136/76 - 153/77)  BP(mean): 94 (31 Mar 2022 19:41) (94 - 112)  RR: 18 (01 Apr 2022 07:55) (17 - 18)  SpO2: 96% (01 Apr 2022 07:55) (94% - 100%)    ROS:   All 10 systems reviewed and found to be negative with the exception of what has been described above.     PE:  Constitutional: NAD, laying in bed  HEENT: NC/AT  Back: no tenderness  Respiratory: respirations even and non labored, LCTA  Cardiovascular: S1S2 regular, no murmurs  Abdomen: soft, not tender, not distended, positive BS  Genitourinary: voiding  Musculoskeletal: no muscle tenderness, no joint swelling or tenderness  Extremities: no pedal edema   Neurological: no focal deficits    04-01    141  |  111<H>  |  19  ----------------------------<  94  4.1   |  25  |  0.74    Ca    9.3      01 Apr 2022 07:03    TPro  7.9  /  Alb  3.2<L>  /  TBili  0.5  /  DBili  x   /  AST  17  /  ALT  32  /  AlkPhos  60  03-31                            11.8   5.49  )-----------( 218      ( 01 Apr 2022 07:03 )             35.6   < from: Xray Foot AP + Lateral + Oblique, Right (03.31.22 @ 16:51) >   XR FOOT COMP MIN 3 VIEWS RT                          PROCEDURE DATE:  03/31/2022          INTERPRETATION:  Clinical history: 62-year-old male toe ulcers.    Three views of the right foot are compared to 3/14/2022 and are   correlated with the MRI of 3/15/2022.    Lucencies in the head of the proximal phalanx and base of the distal   phalanx consistent with fracture/osteomyelitis, new.    Prominent plantar calcaneal spur, calcification at the plantar fascia and   a small Achilles enthesophyte evident on the lateral view.    IMPRESSION:  Fracture/cortical destruction in the hallux, patient scheduled for   amputation    < end of copied text >  < from: Xray Chest 1 View AP/PA. (03.31.22 @ 16:50) >  XR CHEST 1 VIEW                          PROCEDURE DATE:  03/31/2022          INTERPRETATION:  Clinical history: 62-year-old male, admission.    Portable view of the chest is compared to 3/18/2022 and demonstrates a   normal cardiac silhouette and normal pulmonary vasculature with no   consolidation, effusion, gross adenopathy, pneumothorax or acute osseous   finding.    Right-sided PICC line with the tip at the junction of the SVC and right   atrium, unchanged.    IMPRESSION:  No acute radiographic findings and no change    < end of copied text >          
Date of service: 04-02-22 @ 13:29    Lying in bed in NAD  s/p surgery  Has right foot discomfort    ROS: no fever or chills; denies dizziness, no HA, no SOB or cough, no abdominal pain, no diarrhea or constipation; no dysuria    MEDICATIONS  (STANDING):  amLODIPine   Tablet 5 milliGRAM(s) Oral daily  ascorbic acid 500 milliGRAM(s) Oral daily  cefepime   IVPB 2000 milliGRAM(s) IV Intermittent every 12 hours  dextrose 5%. 1000 milliLiter(s) (100 mL/Hr) IV Continuous <Continuous>  dextrose 5%. 1000 milliLiter(s) (50 mL/Hr) IV Continuous <Continuous>  dextrose 50% Injectable 25 Gram(s) IV Push once  dextrose 50% Injectable 12.5 Gram(s) IV Push once  dextrose 50% Injectable 25 Gram(s) IV Push once  doxycycline hyclate Capsule 100 milliGRAM(s) Oral every 12 hours  enoxaparin Injectable 40 milliGRAM(s) SubCutaneous every 24 hours  glucagon  Injectable 1 milliGRAM(s) IntraMuscular once  influenza   Vaccine 0.5 milliLiter(s) IntraMuscular once  insulin glargine Injectable (LANTUS) 20 Unit(s) SubCutaneous at bedtime  insulin lispro (ADMELOG) corrective regimen sliding scale   SubCutaneous three times a day before meals  insulin lispro (ADMELOG) corrective regimen sliding scale   SubCutaneous at bedtime  lisinopril 40 milliGRAM(s) Oral daily  melatonin 5 milliGRAM(s) Oral at bedtime    Vital Signs Last 24 Hrs  T(C): 36.7 (02 Apr 2022 08:37), Max: 37.1 (01 Apr 2022 21:00)  T(F): 98 (02 Apr 2022 08:37), Max: 98.8 (01 Apr 2022 21:00)  HR: 94 (02 Apr 2022 09:35) (82 - 94)  BP: 164/72 (02 Apr 2022 09:35) (126/56 - 169/71)  BP(mean): --  RR: 18 (02 Apr 2022 08:37) (13 - 20)  SpO2: 95% (02 Apr 2022 08:37) (95% - 100%)     Physical exam:    Constitutional:  No acute distress  HEENT: NC/AT, EOMI, PERRLA, conjunctivae clear; ears and nose atraumatic  Neck: supple; thyroid not palpable  Back: no tenderness  Respiratory: respiratory effort normal; clear to auscultation  Cardiovascular: S1S2 regular, no murmurs  Abdomen: soft, not tender, not distended, positive BS  Genitourinary: no suprapubic tenderness  Lymphatic: no LN palpable  Musculoskeletal: no muscle tenderness, no joint swelling or tenderness  Extremities: no pedal edema  Right great toe plantar ulcer; dressing  Neurological/ Psychiatric: AxOx3, judgement and insight normal; moving all extremities  Skin: no rashes; no palpable lesions    Labs: all available labs reviewed                        11.8   5.49  )-----------( 218      ( 01 Apr 2022 07:03 )             35.6     04-01    141  |  111<H>  |  19  ----------------------------<  94  4.1   |  25  |  0.74    Ca    9.3      01 Apr 2022 07:03    TPro  7.9  /  Alb  3.2<L>  /  TBili  0.5  /  DBili  x   /  AST  17  /  ALT  32  /  AlkPhos  60  03-31     LIVER FUNCTIONS - ( 31 Mar 2022 16:34 )  Alb: 3.2 g/dL / Pro: 7.9 gm/dL / ALK PHOS: 60 U/L / ALT: 32 U/L / AST: 17 U/L / GGT: x           COVID-19 PCR: NotDetec (03-31-22 @ 16:34)    Radiology: all available radiological tests reviewed    < from: Xray Foot AP + Lateral + Oblique, Right (03.31.22 @ 16:51) >  Fracture/cortical destruction in the hallux, patient scheduled for amputation  < end of copied text >      Advanced directives addressed: full resuscitation
Date of Service: 4/2/22  HPI; 63 y/o male well known to podiatry p/w with h/o of right great toe OM. Pt seen by Dr. Perrin per previous admission then transitioned care to Dr. Griffith at [facility] wound care center. Pt opted for attempted conservative treatment of right Hallux infection and d/c'd on PICC line with Cefepime. After consultation with Dr. Griffith was advised to pt that hallux was not salvageable and should come to  for admission and surgical procedure in the OR. Pt denies contralateral limb pain. Denies any F/N/V/C/SOB when seen     4/2:Pt seen by podiatry for follow up of right great toe infection. Pt is POD 1 s/p right partial first ray amputation with podiatry. Pt states that he is feeling well , denies any F/N/V/C/SOB when seen this am. States he has some pain every now and then, other than that pt is very pleasant in good spirits.     REVIEW OF SYSTEMS:  CONSTITUTIONAL: No weakness, no fevers   EYES/ENT: No visual changes;  No vertigo or throat pain   NECK: No pain or stiffness  RESPIRATORY: No cough, wheezing, hemoptysis; No shortness of breath  CARDIOVASCULAR: No chest pain or palpitations  GASTROINTESTINAL: No abdominal or epigastric pain. No nausea, vomiting, or hematemesis; No diarrhea or constipation. No melena or hematochezia.  GENITOURINARY: No dysuria, frequency or hematuria  NEUROLOGICAL: No numbness or weakness  SKIN: Wound to R plantar hallux.   All other review of systems is negative unless indicated above    PMH:  Malignant Neoplasm of Kidney  Diabetes  Adrenal Adenoma  Anxiety  HTN (hypertension)    PSH:  History of Cholecystectomy  Basal Cell Carcinoma of Nose Excision  H/O partial nephrectomy    Allergies:  No Known Allergies    MEDICATIONS  (STANDING):  cefepime   IVPB 2000 milliGRAM(s) IV Intermittent every 12 hours    Vitals  Vital Signs Last 24 Hrs  T(C): 37.1 (01 Apr 2022 21:00), Max: 37.1 (01 Apr 2022 21:00)  T(F): 98.8 (01 Apr 2022 21:00), Max: 98.8 (01 Apr 2022 21:00)  HR: 89 (01 Apr 2022 21:00) (82 - 89)  BP: 159/84 (01 Apr 2022 21:00) (126/56 - 163/69)  BP(mean): --  RR: 18 (01 Apr 2022 21:00) (13 - 20)  SpO2: 95% (01 Apr 2022 21:00) (95% - 100%)    Physical Exam:   Constitutiona: NAD, alert;  LE focused; Post op dressing all layers of dressing with dry sanguinous blood.  Derm:  Rt foot post operative wound sutures appear well coapted, skin appears warm and well perfused,  post op surgical site with evidence of dry sanguinous drainage   Vascular: Dorsalis Pedis and Posterior Tibial pulses palpable 1/4.  Capillary re-fill time less then 3 seconds digits 2-5 on Rt   Neuro: Protective sensation diminished to the level of the digits bilateral.  MSK: Muscle strength 5/5 in all major muscle groups of Right lower extremity. 5/5 in all muscle groups of LLE;  .        Labs:                        11.2   7.03  )-----------( 215      ( 02 Apr 2022 07:19 )             34.1   04-02    140  |  110<H>  |  11  ----------------------------<  162<H>  4.3   |  27  |  0.78    Ca    8.9      02 Apr 2022 07:19    TPro  7.9  /  Alb  3.2<L>  /  TBili  0.5  /  DBili  x   /  AST  17  /  ALT  32  /  AlkPhos  60  03-31      Culture - Abscess with Gram Stain (03.15.22 @ 18:00)   Specimen Source: .Abscess Right Foot wound   Culture Results:   Few Streptococcus constellatus "Susceptibilities not performed"   Insufficient growth-culture reincubated        Rad/Ekg:  < from: MR Foot w/wo IV Cont, Right (03.15.22 @ 16:00) >  IMPRESSION:    1.  Plantar great toe skin wound with adjacent edema and enhancement   concerning for cellulitis. No drainable fluid collection.  2.  Small tract from the skin to the first interphalangeal joint space   raising concern for septic arthritis.  3.  Abnormal marrow signal involving the first proximal distal phalanges.   Given the overlying skin wound, the findings are concerning for   osteomyelitis.    --- End of Report ---      < end of copied text >    < from: VA Physiol Extremity Lower 3+ Level, BI (03.16.22 @ 13:28) >  FINDINGS: There are biphasic pulse volume recordings bilaterally. Vessels   are noncompressible in the upper right thigh, calves and ankles.   Therefore, ABIs could not be calculated.    IMPRESSION: ABIs could not be calculated.    Multiple calcified, noncompressible vessels as described above.    < end of copied text >    < from: Xray Foot AP + Lateral + Oblique, Right (03.31.22 @ 16:51) >  MPRESSION:  Fracture/cortical destruction in the hallux, patient scheduled for   amputation    < end of copied text >

## 2022-04-02 NOTE — DISCHARGE NOTE PROVIDER - NSDCMRMEDTOKEN_GEN_ALL_CORE_FT
acetaminophen 325 mg oral tablet: 2 tab(s) orally every 6 hours, As needed, Temp greater or equal to 38C (100.4F), Mild Pain (1 - 3)  Alogliptin 25 mg oral tablet: 1 tab(s) orally once a day  amLODIPine 5 mg oral tablet: 1 tab(s) orally once a day  cefepime 2 g intravenous injection: 2 gram(s) intravenous every 12 hours for 2 more weeks  doxycycline monohydrate 100 mg oral capsule: 1 cap(s) orally every 12 hours for 2 more weeks  lisinopril 40 mg oral tablet: 1 tab(s) orally once a day  Melatonin 5 mg oral tablet: 1 tab(s) orally once a day (at bedtime), As Needed  metFORMIN 500 mg oral tablet, extended release: 2 tab(s) orally 2 times a day  repaglinide 2 mg oral tablet: 1 tab(s) orally 3 times a day (before meals), hold if npo  Toujeo Max SoloStar 300 units/mL subcutaneous solution: 40 unit(s) subcutaneous once a day (at bedtime)  Vitamin C 500 mg oral tablet: 1 tab(s) orally once a day

## 2022-04-02 NOTE — DISCHARGE NOTE PROVIDER - HOSPITAL COURSE
pmd - [facility]  podiatry - dr griffith     61 y/o M with PMH Type 2 DM, adrenal adneoma, renal carcinoma s/p partial nephrectomy in remission, anxiety, HTN presents with right 1st digit ulcer. Pt had a right 1st digit infection and was d/c from  on Cefepime with a PICC line. Dr. Griffith evaluated the pt and advised the pt to come to  for admission and surgical procedure in the ER as hallux is not salvageable. Reports redness at insulin injection sites and swelling of the right leg with ulcer of the 1st toe. Denies headaches, blurry vision, dizziness, fevers, chills, chest pain, SOB, abdominal pain, N/V, diarrhea/constipation. No chest pain or SOB with exertion. Able to climb a flight of stairs without chest pain/SOB.     Prior admission:   - 3/14/2022-3/18/2022: Right distal hallux foot ulcer/osteomyelitis -> wash out and drainage, on Cefepime and doxycycline BID for 6 weeks via PICC. Culture positive for Streptococcus constellatus.     ER course: HR , //77. Labs: Hb 12.3, Glucose 140, Albumin 3.2. EKG: NSR with HR 97 bpm, normal intervals, no ST segment changes, no T wave inversions (personally reviewed).     Imaging:   - XR right foot: bone spurs, osteoarthritis, bony erosion 1st digit, no fracture/dislocation (personally reviewed).   - CXR: no consolidation, no effusion, no pneumothorax, gastric bubble (personally reviewed).     hospital course. pt admitted and underwent Amputation of right great toe with Operative Finding - Purulent drainage with necrotic skin soft tissue and bone.  case d/w ID and podiatry. pt is cleared by podiatry for dc and wound care  instructions given to pt and wife along with  VNS.  plan for dressing to remain c/d/i til wednesday at which time he will see dr griffith in wound care clinic.  he has picc in [place] and abx at home carissa thee will resume for two more weeks as per dr tirado.       Vital Signs Last 24 Hrs  T(C): 36.7 (02 Apr 2022 08:37), Max: 37.1 (01 Apr 2022 21:00)  T(F): 98 (02 Apr 2022 08:37), Max: 98.8 (01 Apr 2022 21:00)  HR: 94 (02 Apr 2022 09:35) (82 - 94)  BP: 164/72 (02 Apr 2022 09:35) (126/56 - 169/71)  BP(mean): --  RR: 18 (02 Apr 2022 08:37) (13 - 20)  SpO2: 95% (02 Apr 2022 08:37) (95% - 100%)  PE:  Constitutional: NAD, laying in bed  HEENT: NC/AT  Back: no tenderness  Respiratory: respirations even and non labored, LCTA  Cardiovascular: S1S2 regular, no murmurs  Abdomen: soft, not tender, not distended, positive BS  Genitourinary: voiding  Musculoskeletal: no muscle tenderness, no joint swelling or tenderness  Extremities: no pedal edema   Neurological: no focal deficits    1. Right 1st digit ulceration and osteomyelitis   - stable for dc home. outpt f/u with dr griffith at wound care clinic on wednesday  - pain meds prn  - ID eval noted. continue cefepime and doxy. he has these at home for 2 more weeks.  BENTLEY Horan has reinstated abx and VNS>     2. Hypertension   - resume home meds    3. Normocytic anemia   - monitor     4. Hyperglycemia in the setting of Type 2 DM - resume home meds    dc home total time spent 40 mins

## 2022-04-02 NOTE — DISCHARGE NOTE PROVIDER - NSDCCPCAREPLAN_GEN_ALL_CORE_FT
PRINCIPAL DISCHARGE DIAGNOSIS  Diagnosis: Diabetic toe ulcer  Assessment and Plan of Treatment: s/p amputation and follow up with dr tripathi on wednesday in wound care clinic   dressing will be changed then. til then keep it clean dry  - return to ER if fever chills or bleeding form wound      SECONDARY DISCHARGE DIAGNOSES  Diagnosis: Osteomyelitis of ankle or foot, acute  Assessment and Plan of Treatment:

## 2022-04-02 NOTE — DISCHARGE NOTE NURSING/CASE MANAGEMENT/SOCIAL WORK - PATIENT PORTAL LINK FT
You can access the FollowMyHealth Patient Portal offered by [facility] by registering at the following website: [email or web address]. By joining [facility]’s FollowMyHealth portal, you will also be able to view your health information using other applications (apps) compatible with our system.

## 2022-04-02 NOTE — DISCHARGE NOTE NURSING/CASE MANAGEMENT/SOCIAL WORK - NSDCPEFALRISK_GEN_ALL_CORE
For information on Fall & Injury Prevention, visit: [email or web address] OR  [email or web address] OR  https://www.cdc.gov/steadi/patient.html

## 2022-04-04 LAB
-  AMPICILLIN/SULBACTAM: SIGNIFICANT CHANGE UP
-  CEFAZOLIN: SIGNIFICANT CHANGE UP
-  CLINDAMYCIN: SIGNIFICANT CHANGE UP
-  ERYTHROMYCIN: SIGNIFICANT CHANGE UP
-  GENTAMICIN: SIGNIFICANT CHANGE UP
-  OXACILLIN: SIGNIFICANT CHANGE UP
-  PENICILLIN: SIGNIFICANT CHANGE UP
-  RIFAMPIN: SIGNIFICANT CHANGE UP
-  TETRACYCLINE: SIGNIFICANT CHANGE UP
-  TRIMETHOPRIM/SULFAMETHOXAZOLE: SIGNIFICANT CHANGE UP
-  VANCOMYCIN: SIGNIFICANT CHANGE UP
METHOD TYPE: SIGNIFICANT CHANGE UP

## 2022-04-05 LAB
CULTURE RESULTS: SIGNIFICANT CHANGE UP
SPECIMEN SOURCE: SIGNIFICANT CHANGE UP

## 2022-04-06 ENCOUNTER — OUTPATIENT (OUTPATIENT)
Dept: OUTPATIENT SERVICES | Facility: HOSPITAL | Age: 62
LOS: 1 days | End: 2022-04-06
Payer: COMMERCIAL

## 2022-04-06 DIAGNOSIS — L03.115 CELLULITIS OF RIGHT LOWER LIMB: ICD-10-CM

## 2022-04-06 DIAGNOSIS — L97.512 NON-PRESSURE CHRONIC ULCER OF OTHER PART OF RIGHT FOOT WITH FAT LAYER EXPOSED: ICD-10-CM

## 2022-04-06 DIAGNOSIS — L97.518 NON-PRESSURE CHRONIC ULCER OF OTHER PART OF RIGHT FOOT WITH OTHER SPECIFIED SEVERITY: ICD-10-CM

## 2022-04-06 DIAGNOSIS — M86.171 OTHER ACUTE OSTEOMYELITIS, RIGHT ANKLE AND FOOT: ICD-10-CM

## 2022-04-06 DIAGNOSIS — I73.9 PERIPHERAL VASCULAR DISEASE, UNSPECIFIED: ICD-10-CM

## 2022-04-06 DIAGNOSIS — E11.40 TYPE 2 DIABETES MELLITUS WITH DIABETIC NEUROPATHY, UNSPECIFIED: ICD-10-CM

## 2022-04-06 DIAGNOSIS — Z90.5 ACQUIRED ABSENCE OF KIDNEY: Chronic | ICD-10-CM

## 2022-04-06 LAB
CULTURE RESULTS: SIGNIFICANT CHANGE UP
SPECIMEN SOURCE: SIGNIFICANT CHANGE UP

## 2022-04-06 PROCEDURE — G0463: CPT

## 2022-04-07 LAB
CULTURE RESULTS: SIGNIFICANT CHANGE UP
ORGANISM # SPEC MICROSCOPIC CNT: SIGNIFICANT CHANGE UP
ORGANISM # SPEC MICROSCOPIC CNT: SIGNIFICANT CHANGE UP
SPECIMEN SOURCE: SIGNIFICANT CHANGE UP

## 2022-04-13 ENCOUNTER — OUTPATIENT (OUTPATIENT)
Dept: OUTPATIENT SERVICES | Facility: HOSPITAL | Age: 62
LOS: 1 days | End: 2022-04-13
Payer: COMMERCIAL

## 2022-04-13 DIAGNOSIS — L97.518 NON-PRESSURE CHRONIC ULCER OF OTHER PART OF RIGHT FOOT WITH OTHER SPECIFIED SEVERITY: ICD-10-CM

## 2022-04-13 DIAGNOSIS — M86.171 OTHER ACUTE OSTEOMYELITIS, RIGHT ANKLE AND FOOT: ICD-10-CM

## 2022-04-13 DIAGNOSIS — L97.512 NON-PRESSURE CHRONIC ULCER OF OTHER PART OF RIGHT FOOT WITH FAT LAYER EXPOSED: ICD-10-CM

## 2022-04-13 DIAGNOSIS — I73.9 PERIPHERAL VASCULAR DISEASE, UNSPECIFIED: ICD-10-CM

## 2022-04-13 DIAGNOSIS — E11.40 TYPE 2 DIABETES MELLITUS WITH DIABETIC NEUROPATHY, UNSPECIFIED: ICD-10-CM

## 2022-04-13 DIAGNOSIS — Z90.5 ACQUIRED ABSENCE OF KIDNEY: Chronic | ICD-10-CM

## 2022-04-13 PROCEDURE — G0463: CPT

## 2022-04-20 ENCOUNTER — OUTPATIENT (OUTPATIENT)
Dept: OUTPATIENT SERVICES | Facility: HOSPITAL | Age: 62
LOS: 1 days | End: 2022-04-20
Payer: COMMERCIAL

## 2022-04-20 DIAGNOSIS — M86.171 OTHER ACUTE OSTEOMYELITIS, RIGHT ANKLE AND FOOT: ICD-10-CM

## 2022-04-20 DIAGNOSIS — L03.115 CELLULITIS OF RIGHT LOWER LIMB: ICD-10-CM

## 2022-04-20 DIAGNOSIS — Z90.5 ACQUIRED ABSENCE OF KIDNEY: Chronic | ICD-10-CM

## 2022-04-20 DIAGNOSIS — L97.518 NON-PRESSURE CHRONIC ULCER OF OTHER PART OF RIGHT FOOT WITH OTHER SPECIFIED SEVERITY: ICD-10-CM

## 2022-04-20 DIAGNOSIS — L97.512 NON-PRESSURE CHRONIC ULCER OF OTHER PART OF RIGHT FOOT WITH FAT LAYER EXPOSED: ICD-10-CM

## 2022-04-20 PROCEDURE — G0463: CPT

## 2022-04-27 ENCOUNTER — OUTPATIENT (OUTPATIENT)
Dept: OUTPATIENT SERVICES | Facility: HOSPITAL | Age: 62
LOS: 1 days | End: 2022-04-27
Payer: COMMERCIAL

## 2022-04-27 DIAGNOSIS — L97.518 NON-PRESSURE CHRONIC ULCER OF OTHER PART OF RIGHT FOOT WITH OTHER SPECIFIED SEVERITY: ICD-10-CM

## 2022-04-27 DIAGNOSIS — E11.40 TYPE 2 DIABETES MELLITUS WITH DIABETIC NEUROPATHY, UNSPECIFIED: ICD-10-CM

## 2022-04-27 DIAGNOSIS — M86.171 OTHER ACUTE OSTEOMYELITIS, RIGHT ANKLE AND FOOT: ICD-10-CM

## 2022-04-27 DIAGNOSIS — I73.9 PERIPHERAL VASCULAR DISEASE, UNSPECIFIED: ICD-10-CM

## 2022-04-27 DIAGNOSIS — L97.512 NON-PRESSURE CHRONIC ULCER OF OTHER PART OF RIGHT FOOT WITH FAT LAYER EXPOSED: ICD-10-CM

## 2022-04-27 DIAGNOSIS — Z90.5 ACQUIRED ABSENCE OF KIDNEY: Chronic | ICD-10-CM

## 2022-04-27 DIAGNOSIS — L03.115 CELLULITIS OF RIGHT LOWER LIMB: ICD-10-CM

## 2022-04-27 PROCEDURE — U0003: CPT

## 2022-04-27 PROCEDURE — U0005: CPT

## 2022-04-27 PROCEDURE — 97597 DBRDMT OPN WND 1ST 20 CM/<: CPT | Mod: 79

## 2022-04-27 PROCEDURE — G0463: CPT

## 2022-04-29 ENCOUNTER — OUTPATIENT (OUTPATIENT)
Dept: OUTPATIENT SERVICES | Facility: HOSPITAL | Age: 62
LOS: 1 days | End: 2022-04-29
Payer: COMMERCIAL

## 2022-04-29 ENCOUNTER — APPOINTMENT (OUTPATIENT)
Dept: RADIOLOGY | Facility: CLINIC | Age: 62
End: 2022-04-29
Payer: COMMERCIAL

## 2022-04-29 DIAGNOSIS — Z90.5 ACQUIRED ABSENCE OF KIDNEY: Chronic | ICD-10-CM

## 2022-04-29 DIAGNOSIS — Z00.8 ENCOUNTER FOR OTHER GENERAL EXAMINATION: ICD-10-CM

## 2022-04-29 PROCEDURE — 73630 X-RAY EXAM OF FOOT: CPT

## 2022-04-29 PROCEDURE — 73630 X-RAY EXAM OF FOOT: CPT | Mod: 26,RT

## 2022-05-02 ENCOUNTER — OUTPATIENT (OUTPATIENT)
Dept: OUTPATIENT SERVICES | Facility: HOSPITAL | Age: 62
LOS: 1 days | End: 2022-05-02
Payer: COMMERCIAL

## 2022-05-02 DIAGNOSIS — E11.40 TYPE 2 DIABETES MELLITUS WITH DIABETIC NEUROPATHY, UNSPECIFIED: ICD-10-CM

## 2022-05-02 DIAGNOSIS — Z90.5 ACQUIRED ABSENCE OF KIDNEY: Chronic | ICD-10-CM

## 2022-05-02 DIAGNOSIS — M86.171 OTHER ACUTE OSTEOMYELITIS, RIGHT ANKLE AND FOOT: ICD-10-CM

## 2022-05-02 DIAGNOSIS — L97.518 NON-PRESSURE CHRONIC ULCER OF OTHER PART OF RIGHT FOOT WITH OTHER SPECIFIED SEVERITY: ICD-10-CM

## 2022-05-02 DIAGNOSIS — L97.512 NON-PRESSURE CHRONIC ULCER OF OTHER PART OF RIGHT FOOT WITH FAT LAYER EXPOSED: ICD-10-CM

## 2022-05-02 PROCEDURE — 82962 GLUCOSE BLOOD TEST: CPT

## 2022-05-02 PROCEDURE — G0277: CPT

## 2022-05-03 ENCOUNTER — OUTPATIENT (OUTPATIENT)
Dept: OUTPATIENT SERVICES | Facility: HOSPITAL | Age: 62
LOS: 1 days | End: 2022-05-03
Payer: COMMERCIAL

## 2022-05-03 DIAGNOSIS — L97.518 NON-PRESSURE CHRONIC ULCER OF OTHER PART OF RIGHT FOOT WITH OTHER SPECIFIED SEVERITY: ICD-10-CM

## 2022-05-03 DIAGNOSIS — L97.512 NON-PRESSURE CHRONIC ULCER OF OTHER PART OF RIGHT FOOT WITH FAT LAYER EXPOSED: ICD-10-CM

## 2022-05-03 DIAGNOSIS — E11.40 TYPE 2 DIABETES MELLITUS WITH DIABETIC NEUROPATHY, UNSPECIFIED: ICD-10-CM

## 2022-05-03 DIAGNOSIS — M86.171 OTHER ACUTE OSTEOMYELITIS, RIGHT ANKLE AND FOOT: ICD-10-CM

## 2022-05-03 DIAGNOSIS — Z90.5 ACQUIRED ABSENCE OF KIDNEY: Chronic | ICD-10-CM

## 2022-05-03 PROCEDURE — G0277: CPT

## 2022-05-03 PROCEDURE — 82962 GLUCOSE BLOOD TEST: CPT

## 2022-05-04 ENCOUNTER — OUTPATIENT (OUTPATIENT)
Dept: OUTPATIENT SERVICES | Facility: HOSPITAL | Age: 62
LOS: 1 days | End: 2022-05-04
Payer: COMMERCIAL

## 2022-05-04 DIAGNOSIS — L97.518 NON-PRESSURE CHRONIC ULCER OF OTHER PART OF RIGHT FOOT WITH OTHER SPECIFIED SEVERITY: ICD-10-CM

## 2022-05-04 DIAGNOSIS — M86.171 OTHER ACUTE OSTEOMYELITIS, RIGHT ANKLE AND FOOT: ICD-10-CM

## 2022-05-04 DIAGNOSIS — E11.40 TYPE 2 DIABETES MELLITUS WITH DIABETIC NEUROPATHY, UNSPECIFIED: ICD-10-CM

## 2022-05-04 DIAGNOSIS — L97.512 NON-PRESSURE CHRONIC ULCER OF OTHER PART OF RIGHT FOOT WITH FAT LAYER EXPOSED: ICD-10-CM

## 2022-05-04 DIAGNOSIS — Z90.5 ACQUIRED ABSENCE OF KIDNEY: Chronic | ICD-10-CM

## 2022-05-04 PROCEDURE — 82962 GLUCOSE BLOOD TEST: CPT

## 2022-05-04 PROCEDURE — 11042 DBRDMT SUBQ TIS 1ST 20SQCM/<: CPT

## 2022-05-04 PROCEDURE — G0463: CPT

## 2022-05-04 PROCEDURE — G0277: CPT

## 2022-05-05 ENCOUNTER — OUTPATIENT (OUTPATIENT)
Dept: OUTPATIENT SERVICES | Facility: HOSPITAL | Age: 62
LOS: 1 days | End: 2022-05-05
Payer: COMMERCIAL

## 2022-05-05 DIAGNOSIS — I73.9 PERIPHERAL VASCULAR DISEASE, UNSPECIFIED: ICD-10-CM

## 2022-05-05 DIAGNOSIS — L97.512 NON-PRESSURE CHRONIC ULCER OF OTHER PART OF RIGHT FOOT WITH FAT LAYER EXPOSED: ICD-10-CM

## 2022-05-05 DIAGNOSIS — L03.115 CELLULITIS OF RIGHT LOWER LIMB: ICD-10-CM

## 2022-05-05 DIAGNOSIS — M86.171 OTHER ACUTE OSTEOMYELITIS, RIGHT ANKLE AND FOOT: ICD-10-CM

## 2022-05-05 DIAGNOSIS — E11.40 TYPE 2 DIABETES MELLITUS WITH DIABETIC NEUROPATHY, UNSPECIFIED: ICD-10-CM

## 2022-05-05 DIAGNOSIS — Z90.5 ACQUIRED ABSENCE OF KIDNEY: Chronic | ICD-10-CM

## 2022-05-05 DIAGNOSIS — L97.518 NON-PRESSURE CHRONIC ULCER OF OTHER PART OF RIGHT FOOT WITH OTHER SPECIFIED SEVERITY: ICD-10-CM

## 2022-05-05 PROCEDURE — G0277: CPT

## 2022-05-05 PROCEDURE — 82962 GLUCOSE BLOOD TEST: CPT

## 2022-05-05 PROCEDURE — 99183 HYPERBARIC OXYGEN THERAPY: CPT

## 2022-05-06 ENCOUNTER — OUTPATIENT (OUTPATIENT)
Dept: OUTPATIENT SERVICES | Facility: HOSPITAL | Age: 62
LOS: 1 days | End: 2022-05-06
Payer: COMMERCIAL

## 2022-05-06 DIAGNOSIS — L97.518 NON-PRESSURE CHRONIC ULCER OF OTHER PART OF RIGHT FOOT WITH OTHER SPECIFIED SEVERITY: ICD-10-CM

## 2022-05-06 DIAGNOSIS — Z90.5 ACQUIRED ABSENCE OF KIDNEY: Chronic | ICD-10-CM

## 2022-05-06 DIAGNOSIS — E11.40 TYPE 2 DIABETES MELLITUS WITH DIABETIC NEUROPATHY, UNSPECIFIED: ICD-10-CM

## 2022-05-06 DIAGNOSIS — M86.171 OTHER ACUTE OSTEOMYELITIS, RIGHT ANKLE AND FOOT: ICD-10-CM

## 2022-05-06 DIAGNOSIS — L97.512 NON-PRESSURE CHRONIC ULCER OF OTHER PART OF RIGHT FOOT WITH FAT LAYER EXPOSED: ICD-10-CM

## 2022-05-06 PROCEDURE — 82962 GLUCOSE BLOOD TEST: CPT

## 2022-05-06 PROCEDURE — G0277: CPT

## 2022-05-09 ENCOUNTER — OUTPATIENT (OUTPATIENT)
Dept: OUTPATIENT SERVICES | Facility: HOSPITAL | Age: 62
LOS: 1 days | End: 2022-05-09
Payer: COMMERCIAL

## 2022-05-09 DIAGNOSIS — L97.512 NON-PRESSURE CHRONIC ULCER OF OTHER PART OF RIGHT FOOT WITH FAT LAYER EXPOSED: ICD-10-CM

## 2022-05-09 DIAGNOSIS — E11.40 TYPE 2 DIABETES MELLITUS WITH DIABETIC NEUROPATHY, UNSPECIFIED: ICD-10-CM

## 2022-05-09 DIAGNOSIS — L97.518 NON-PRESSURE CHRONIC ULCER OF OTHER PART OF RIGHT FOOT WITH OTHER SPECIFIED SEVERITY: ICD-10-CM

## 2022-05-09 DIAGNOSIS — M86.171 OTHER ACUTE OSTEOMYELITIS, RIGHT ANKLE AND FOOT: ICD-10-CM

## 2022-05-09 DIAGNOSIS — Z90.5 ACQUIRED ABSENCE OF KIDNEY: Chronic | ICD-10-CM

## 2022-05-09 PROCEDURE — G0277: CPT

## 2022-05-09 PROCEDURE — 82962 GLUCOSE BLOOD TEST: CPT

## 2022-05-10 ENCOUNTER — OUTPATIENT (OUTPATIENT)
Dept: OUTPATIENT SERVICES | Facility: HOSPITAL | Age: 62
LOS: 1 days | End: 2022-05-10
Payer: COMMERCIAL

## 2022-05-10 DIAGNOSIS — L97.518 NON-PRESSURE CHRONIC ULCER OF OTHER PART OF RIGHT FOOT WITH OTHER SPECIFIED SEVERITY: ICD-10-CM

## 2022-05-10 DIAGNOSIS — M86.171 OTHER ACUTE OSTEOMYELITIS, RIGHT ANKLE AND FOOT: ICD-10-CM

## 2022-05-10 DIAGNOSIS — Z90.5 ACQUIRED ABSENCE OF KIDNEY: Chronic | ICD-10-CM

## 2022-05-10 DIAGNOSIS — E11.40 TYPE 2 DIABETES MELLITUS WITH DIABETIC NEUROPATHY, UNSPECIFIED: ICD-10-CM

## 2022-05-10 DIAGNOSIS — L97.512 NON-PRESSURE CHRONIC ULCER OF OTHER PART OF RIGHT FOOT WITH FAT LAYER EXPOSED: ICD-10-CM

## 2022-05-10 DIAGNOSIS — I73.9 PERIPHERAL VASCULAR DISEASE, UNSPECIFIED: ICD-10-CM

## 2022-05-10 DIAGNOSIS — L03.115 CELLULITIS OF RIGHT LOWER LIMB: ICD-10-CM

## 2022-05-10 PROCEDURE — G0277: CPT

## 2022-05-10 PROCEDURE — 82962 GLUCOSE BLOOD TEST: CPT

## 2022-05-10 PROCEDURE — 99183 HYPERBARIC OXYGEN THERAPY: CPT

## 2022-05-11 ENCOUNTER — OUTPATIENT (OUTPATIENT)
Dept: OUTPATIENT SERVICES | Facility: HOSPITAL | Age: 62
LOS: 1 days | End: 2022-05-11
Payer: COMMERCIAL

## 2022-05-11 DIAGNOSIS — L97.512 NON-PRESSURE CHRONIC ULCER OF OTHER PART OF RIGHT FOOT WITH FAT LAYER EXPOSED: ICD-10-CM

## 2022-05-11 DIAGNOSIS — L03.115 CELLULITIS OF RIGHT LOWER LIMB: ICD-10-CM

## 2022-05-11 DIAGNOSIS — Z90.5 ACQUIRED ABSENCE OF KIDNEY: Chronic | ICD-10-CM

## 2022-05-11 DIAGNOSIS — L97.518 NON-PRESSURE CHRONIC ULCER OF OTHER PART OF RIGHT FOOT WITH OTHER SPECIFIED SEVERITY: ICD-10-CM

## 2022-05-11 DIAGNOSIS — M86.171 OTHER ACUTE OSTEOMYELITIS, RIGHT ANKLE AND FOOT: ICD-10-CM

## 2022-05-11 DIAGNOSIS — E11.40 TYPE 2 DIABETES MELLITUS WITH DIABETIC NEUROPATHY, UNSPECIFIED: ICD-10-CM

## 2022-05-11 PROCEDURE — G0463: CPT

## 2022-05-12 ENCOUNTER — OUTPATIENT (OUTPATIENT)
Dept: OUTPATIENT SERVICES | Facility: HOSPITAL | Age: 62
LOS: 1 days | End: 2022-05-12
Payer: COMMERCIAL

## 2022-05-12 DIAGNOSIS — L03.115 CELLULITIS OF RIGHT LOWER LIMB: ICD-10-CM

## 2022-05-12 DIAGNOSIS — M86.171 OTHER ACUTE OSTEOMYELITIS, RIGHT ANKLE AND FOOT: ICD-10-CM

## 2022-05-12 DIAGNOSIS — I73.9 PERIPHERAL VASCULAR DISEASE, UNSPECIFIED: ICD-10-CM

## 2022-05-12 DIAGNOSIS — L97.518 NON-PRESSURE CHRONIC ULCER OF OTHER PART OF RIGHT FOOT WITH OTHER SPECIFIED SEVERITY: ICD-10-CM

## 2022-05-12 DIAGNOSIS — Z90.5 ACQUIRED ABSENCE OF KIDNEY: Chronic | ICD-10-CM

## 2022-05-12 DIAGNOSIS — L97.512 NON-PRESSURE CHRONIC ULCER OF OTHER PART OF RIGHT FOOT WITH FAT LAYER EXPOSED: ICD-10-CM

## 2022-05-12 PROCEDURE — 82962 GLUCOSE BLOOD TEST: CPT

## 2022-05-12 PROCEDURE — 99183 HYPERBARIC OXYGEN THERAPY: CPT

## 2022-05-12 PROCEDURE — G0277: CPT

## 2022-05-13 ENCOUNTER — OUTPATIENT (OUTPATIENT)
Dept: OUTPATIENT SERVICES | Facility: HOSPITAL | Age: 62
LOS: 1 days | End: 2022-05-13
Payer: COMMERCIAL

## 2022-05-13 DIAGNOSIS — M86.171 OTHER ACUTE OSTEOMYELITIS, RIGHT ANKLE AND FOOT: ICD-10-CM

## 2022-05-13 DIAGNOSIS — L03.115 CELLULITIS OF RIGHT LOWER LIMB: ICD-10-CM

## 2022-05-13 DIAGNOSIS — L97.518 NON-PRESSURE CHRONIC ULCER OF OTHER PART OF RIGHT FOOT WITH OTHER SPECIFIED SEVERITY: ICD-10-CM

## 2022-05-13 DIAGNOSIS — E11.40 TYPE 2 DIABETES MELLITUS WITH DIABETIC NEUROPATHY, UNSPECIFIED: ICD-10-CM

## 2022-05-13 DIAGNOSIS — L97.512 NON-PRESSURE CHRONIC ULCER OF OTHER PART OF RIGHT FOOT WITH FAT LAYER EXPOSED: ICD-10-CM

## 2022-05-13 DIAGNOSIS — Z90.5 ACQUIRED ABSENCE OF KIDNEY: Chronic | ICD-10-CM

## 2022-05-13 PROCEDURE — G0277: CPT

## 2022-05-13 PROCEDURE — 82962 GLUCOSE BLOOD TEST: CPT

## 2022-05-16 ENCOUNTER — OUTPATIENT (OUTPATIENT)
Dept: OUTPATIENT SERVICES | Facility: HOSPITAL | Age: 62
LOS: 1 days | End: 2022-05-16
Payer: COMMERCIAL

## 2022-05-16 DIAGNOSIS — L97.518 NON-PRESSURE CHRONIC ULCER OF OTHER PART OF RIGHT FOOT WITH OTHER SPECIFIED SEVERITY: ICD-10-CM

## 2022-05-16 DIAGNOSIS — E11.40 TYPE 2 DIABETES MELLITUS WITH DIABETIC NEUROPATHY, UNSPECIFIED: ICD-10-CM

## 2022-05-16 DIAGNOSIS — M86.171 OTHER ACUTE OSTEOMYELITIS, RIGHT ANKLE AND FOOT: ICD-10-CM

## 2022-05-16 DIAGNOSIS — L97.512 NON-PRESSURE CHRONIC ULCER OF OTHER PART OF RIGHT FOOT WITH FAT LAYER EXPOSED: ICD-10-CM

## 2022-05-16 DIAGNOSIS — Z90.5 ACQUIRED ABSENCE OF KIDNEY: Chronic | ICD-10-CM

## 2022-05-16 DIAGNOSIS — I73.9 PERIPHERAL VASCULAR DISEASE, UNSPECIFIED: ICD-10-CM

## 2022-05-16 PROCEDURE — 82962 GLUCOSE BLOOD TEST: CPT

## 2022-05-16 PROCEDURE — G0277: CPT

## 2022-05-17 ENCOUNTER — OUTPATIENT (OUTPATIENT)
Dept: OUTPATIENT SERVICES | Facility: HOSPITAL | Age: 62
LOS: 1 days | End: 2022-05-17
Payer: COMMERCIAL

## 2022-05-17 DIAGNOSIS — E11.40 TYPE 2 DIABETES MELLITUS WITH DIABETIC NEUROPATHY, UNSPECIFIED: ICD-10-CM

## 2022-05-17 DIAGNOSIS — M86.171 OTHER ACUTE OSTEOMYELITIS, RIGHT ANKLE AND FOOT: ICD-10-CM

## 2022-05-17 DIAGNOSIS — L97.512 NON-PRESSURE CHRONIC ULCER OF OTHER PART OF RIGHT FOOT WITH FAT LAYER EXPOSED: ICD-10-CM

## 2022-05-17 DIAGNOSIS — L97.518 NON-PRESSURE CHRONIC ULCER OF OTHER PART OF RIGHT FOOT WITH OTHER SPECIFIED SEVERITY: ICD-10-CM

## 2022-05-17 DIAGNOSIS — I73.9 PERIPHERAL VASCULAR DISEASE, UNSPECIFIED: ICD-10-CM

## 2022-05-17 DIAGNOSIS — Z90.5 ACQUIRED ABSENCE OF KIDNEY: Chronic | ICD-10-CM

## 2022-05-17 PROCEDURE — 99183 HYPERBARIC OXYGEN THERAPY: CPT

## 2022-05-17 PROCEDURE — 82962 GLUCOSE BLOOD TEST: CPT

## 2022-05-17 PROCEDURE — G0277: CPT

## 2022-05-18 ENCOUNTER — OUTPATIENT (OUTPATIENT)
Dept: OUTPATIENT SERVICES | Facility: HOSPITAL | Age: 62
LOS: 1 days | End: 2022-05-18
Payer: COMMERCIAL

## 2022-05-18 DIAGNOSIS — L97.518 NON-PRESSURE CHRONIC ULCER OF OTHER PART OF RIGHT FOOT WITH OTHER SPECIFIED SEVERITY: ICD-10-CM

## 2022-05-18 DIAGNOSIS — Z90.5 ACQUIRED ABSENCE OF KIDNEY: Chronic | ICD-10-CM

## 2022-05-18 DIAGNOSIS — E11.40 TYPE 2 DIABETES MELLITUS WITH DIABETIC NEUROPATHY, UNSPECIFIED: ICD-10-CM

## 2022-05-18 DIAGNOSIS — I73.9 PERIPHERAL VASCULAR DISEASE, UNSPECIFIED: ICD-10-CM

## 2022-05-18 DIAGNOSIS — L97.512 NON-PRESSURE CHRONIC ULCER OF OTHER PART OF RIGHT FOOT WITH FAT LAYER EXPOSED: ICD-10-CM

## 2022-05-18 DIAGNOSIS — M86.171 OTHER ACUTE OSTEOMYELITIS, RIGHT ANKLE AND FOOT: ICD-10-CM

## 2022-05-18 PROCEDURE — G0277: CPT

## 2022-05-18 PROCEDURE — G0463: CPT

## 2022-05-18 PROCEDURE — U0005: CPT

## 2022-05-18 PROCEDURE — 82962 GLUCOSE BLOOD TEST: CPT

## 2022-05-18 PROCEDURE — 97597 DBRDMT OPN WND 1ST 20 CM/<: CPT

## 2022-05-18 PROCEDURE — U0003: CPT

## 2022-05-20 DIAGNOSIS — L97.512 NON-PRESSURE CHRONIC ULCER OF OTHER PART OF RIGHT FOOT WITH FAT LAYER EXPOSED: ICD-10-CM

## 2022-05-25 ENCOUNTER — OUTPATIENT (OUTPATIENT)
Dept: OUTPATIENT SERVICES | Facility: HOSPITAL | Age: 62
LOS: 1 days | End: 2022-05-25
Payer: COMMERCIAL

## 2022-05-25 DIAGNOSIS — L03.115 CELLULITIS OF RIGHT LOWER LIMB: ICD-10-CM

## 2022-05-25 DIAGNOSIS — M86.171 OTHER ACUTE OSTEOMYELITIS, RIGHT ANKLE AND FOOT: ICD-10-CM

## 2022-05-25 DIAGNOSIS — I73.9 PERIPHERAL VASCULAR DISEASE, UNSPECIFIED: ICD-10-CM

## 2022-05-25 DIAGNOSIS — L97.518 NON-PRESSURE CHRONIC ULCER OF OTHER PART OF RIGHT FOOT WITH OTHER SPECIFIED SEVERITY: ICD-10-CM

## 2022-05-25 DIAGNOSIS — L97.512 NON-PRESSURE CHRONIC ULCER OF OTHER PART OF RIGHT FOOT WITH FAT LAYER EXPOSED: ICD-10-CM

## 2022-05-25 DIAGNOSIS — E11.40 TYPE 2 DIABETES MELLITUS WITH DIABETIC NEUROPATHY, UNSPECIFIED: ICD-10-CM

## 2022-05-25 DIAGNOSIS — Z90.5 ACQUIRED ABSENCE OF KIDNEY: Chronic | ICD-10-CM

## 2022-05-25 PROCEDURE — 11042 DBRDMT SUBQ TIS 1ST 20SQCM/<: CPT

## 2022-06-01 ENCOUNTER — OUTPATIENT (OUTPATIENT)
Dept: OUTPATIENT SERVICES | Facility: HOSPITAL | Age: 62
LOS: 1 days | End: 2022-06-01
Payer: COMMERCIAL

## 2022-06-01 DIAGNOSIS — L97.512 NON-PRESSURE CHRONIC ULCER OF OTHER PART OF RIGHT FOOT WITH FAT LAYER EXPOSED: ICD-10-CM

## 2022-06-01 DIAGNOSIS — L03.115 CELLULITIS OF RIGHT LOWER LIMB: ICD-10-CM

## 2022-06-01 DIAGNOSIS — E11.40 TYPE 2 DIABETES MELLITUS WITH DIABETIC NEUROPATHY, UNSPECIFIED: ICD-10-CM

## 2022-06-01 DIAGNOSIS — L97.518 NON-PRESSURE CHRONIC ULCER OF OTHER PART OF RIGHT FOOT WITH OTHER SPECIFIED SEVERITY: ICD-10-CM

## 2022-06-01 DIAGNOSIS — I73.9 PERIPHERAL VASCULAR DISEASE, UNSPECIFIED: ICD-10-CM

## 2022-06-01 DIAGNOSIS — M86.171 OTHER ACUTE OSTEOMYELITIS, RIGHT ANKLE AND FOOT: ICD-10-CM

## 2022-06-01 DIAGNOSIS — Z90.5 ACQUIRED ABSENCE OF KIDNEY: Chronic | ICD-10-CM

## 2022-06-01 PROCEDURE — G0463: CPT

## 2022-06-01 PROCEDURE — 11042 DBRDMT SUBQ TIS 1ST 20SQCM/<: CPT

## 2022-06-08 ENCOUNTER — OUTPATIENT (OUTPATIENT)
Dept: OUTPATIENT SERVICES | Facility: HOSPITAL | Age: 62
LOS: 1 days | End: 2022-06-08
Payer: COMMERCIAL

## 2022-06-08 DIAGNOSIS — Z90.5 ACQUIRED ABSENCE OF KIDNEY: Chronic | ICD-10-CM

## 2022-06-08 DIAGNOSIS — L97.518 NON-PRESSURE CHRONIC ULCER OF OTHER PART OF RIGHT FOOT WITH OTHER SPECIFIED SEVERITY: ICD-10-CM

## 2022-06-08 DIAGNOSIS — M86.171 OTHER ACUTE OSTEOMYELITIS, RIGHT ANKLE AND FOOT: ICD-10-CM

## 2022-06-08 DIAGNOSIS — L03.115 CELLULITIS OF RIGHT LOWER LIMB: ICD-10-CM

## 2022-06-08 DIAGNOSIS — L97.512 NON-PRESSURE CHRONIC ULCER OF OTHER PART OF RIGHT FOOT WITH FAT LAYER EXPOSED: ICD-10-CM

## 2022-06-08 DIAGNOSIS — E11.40 TYPE 2 DIABETES MELLITUS WITH DIABETIC NEUROPATHY, UNSPECIFIED: ICD-10-CM

## 2022-06-08 DIAGNOSIS — I73.9 PERIPHERAL VASCULAR DISEASE, UNSPECIFIED: ICD-10-CM

## 2022-06-08 PROCEDURE — 11042 DBRDMT SUBQ TIS 1ST 20SQCM/<: CPT

## 2022-06-08 PROCEDURE — G0463: CPT

## 2022-06-15 ENCOUNTER — OUTPATIENT (OUTPATIENT)
Dept: OUTPATIENT SERVICES | Facility: HOSPITAL | Age: 62
LOS: 1 days | End: 2022-06-15
Payer: COMMERCIAL

## 2022-06-15 DIAGNOSIS — L03.115 CELLULITIS OF RIGHT LOWER LIMB: ICD-10-CM

## 2022-06-15 DIAGNOSIS — E11.40 TYPE 2 DIABETES MELLITUS WITH DIABETIC NEUROPATHY, UNSPECIFIED: ICD-10-CM

## 2022-06-15 DIAGNOSIS — L97.518 NON-PRESSURE CHRONIC ULCER OF OTHER PART OF RIGHT FOOT WITH OTHER SPECIFIED SEVERITY: ICD-10-CM

## 2022-06-15 DIAGNOSIS — L97.512 NON-PRESSURE CHRONIC ULCER OF OTHER PART OF RIGHT FOOT WITH FAT LAYER EXPOSED: ICD-10-CM

## 2022-06-15 DIAGNOSIS — M86.171 OTHER ACUTE OSTEOMYELITIS, RIGHT ANKLE AND FOOT: ICD-10-CM

## 2022-06-15 DIAGNOSIS — I73.9 PERIPHERAL VASCULAR DISEASE, UNSPECIFIED: ICD-10-CM

## 2022-06-15 DIAGNOSIS — Z90.5 ACQUIRED ABSENCE OF KIDNEY: Chronic | ICD-10-CM

## 2022-06-15 PROCEDURE — 11042 DBRDMT SUBQ TIS 1ST 20SQCM/<: CPT

## 2022-06-15 PROCEDURE — G0463: CPT

## 2022-06-22 ENCOUNTER — OUTPATIENT (OUTPATIENT)
Dept: OUTPATIENT SERVICES | Facility: HOSPITAL | Age: 62
LOS: 1 days | End: 2022-06-22
Payer: COMMERCIAL

## 2022-06-22 DIAGNOSIS — L97.518 NON-PRESSURE CHRONIC ULCER OF OTHER PART OF RIGHT FOOT WITH OTHER SPECIFIED SEVERITY: ICD-10-CM

## 2022-06-22 DIAGNOSIS — L03.115 CELLULITIS OF RIGHT LOWER LIMB: ICD-10-CM

## 2022-06-22 DIAGNOSIS — I73.9 PERIPHERAL VASCULAR DISEASE, UNSPECIFIED: ICD-10-CM

## 2022-06-22 DIAGNOSIS — E11.40 TYPE 2 DIABETES MELLITUS WITH DIABETIC NEUROPATHY, UNSPECIFIED: ICD-10-CM

## 2022-06-22 DIAGNOSIS — M86.171 OTHER ACUTE OSTEOMYELITIS, RIGHT ANKLE AND FOOT: ICD-10-CM

## 2022-06-22 DIAGNOSIS — L97.522 NON-PRESSURE CHRONIC ULCER OF OTHER PART OF LEFT FOOT WITH FAT LAYER EXPOSED: ICD-10-CM

## 2022-06-22 DIAGNOSIS — Z90.5 ACQUIRED ABSENCE OF KIDNEY: Chronic | ICD-10-CM

## 2022-06-22 PROCEDURE — 11042 DBRDMT SUBQ TIS 1ST 20SQCM/<: CPT

## 2022-06-29 ENCOUNTER — OUTPATIENT (OUTPATIENT)
Dept: OUTPATIENT SERVICES | Facility: HOSPITAL | Age: 62
LOS: 1 days | End: 2022-06-29
Payer: COMMERCIAL

## 2022-06-29 DIAGNOSIS — I73.9 PERIPHERAL VASCULAR DISEASE, UNSPECIFIED: ICD-10-CM

## 2022-06-29 DIAGNOSIS — Z90.5 ACQUIRED ABSENCE OF KIDNEY: Chronic | ICD-10-CM

## 2022-06-29 DIAGNOSIS — M86.171 OTHER ACUTE OSTEOMYELITIS, RIGHT ANKLE AND FOOT: ICD-10-CM

## 2022-06-29 DIAGNOSIS — L97.518 NON-PRESSURE CHRONIC ULCER OF OTHER PART OF RIGHT FOOT WITH OTHER SPECIFIED SEVERITY: ICD-10-CM

## 2022-06-29 DIAGNOSIS — L03.115 CELLULITIS OF RIGHT LOWER LIMB: ICD-10-CM

## 2022-06-29 DIAGNOSIS — E11.40 TYPE 2 DIABETES MELLITUS WITH DIABETIC NEUROPATHY, UNSPECIFIED: ICD-10-CM

## 2022-06-29 DIAGNOSIS — L97.522 NON-PRESSURE CHRONIC ULCER OF OTHER PART OF LEFT FOOT WITH FAT LAYER EXPOSED: ICD-10-CM

## 2022-06-29 PROCEDURE — G0463: CPT

## 2022-07-13 ENCOUNTER — OUTPATIENT (OUTPATIENT)
Dept: OUTPATIENT SERVICES | Facility: HOSPITAL | Age: 62
LOS: 1 days | End: 2022-07-13
Payer: COMMERCIAL

## 2022-07-13 DIAGNOSIS — L97.522 NON-PRESSURE CHRONIC ULCER OF OTHER PART OF LEFT FOOT WITH FAT LAYER EXPOSED: ICD-10-CM

## 2022-07-13 DIAGNOSIS — L97.518 NON-PRESSURE CHRONIC ULCER OF OTHER PART OF RIGHT FOOT WITH OTHER SPECIFIED SEVERITY: ICD-10-CM

## 2022-07-13 DIAGNOSIS — E11.40 TYPE 2 DIABETES MELLITUS WITH DIABETIC NEUROPATHY, UNSPECIFIED: ICD-10-CM

## 2022-07-13 DIAGNOSIS — M86.171 OTHER ACUTE OSTEOMYELITIS, RIGHT ANKLE AND FOOT: ICD-10-CM

## 2022-07-13 DIAGNOSIS — I73.9 PERIPHERAL VASCULAR DISEASE, UNSPECIFIED: ICD-10-CM

## 2022-07-13 DIAGNOSIS — L03.115 CELLULITIS OF RIGHT LOWER LIMB: ICD-10-CM

## 2022-07-13 DIAGNOSIS — Z90.5 ACQUIRED ABSENCE OF KIDNEY: Chronic | ICD-10-CM

## 2022-07-13 PROCEDURE — 11042 DBRDMT SUBQ TIS 1ST 20SQCM/<: CPT

## 2022-07-20 ENCOUNTER — OUTPATIENT (OUTPATIENT)
Dept: OUTPATIENT SERVICES | Facility: HOSPITAL | Age: 62
LOS: 1 days | End: 2022-07-20
Payer: COMMERCIAL

## 2022-07-20 DIAGNOSIS — L97.518 NON-PRESSURE CHRONIC ULCER OF OTHER PART OF RIGHT FOOT WITH OTHER SPECIFIED SEVERITY: ICD-10-CM

## 2022-07-20 DIAGNOSIS — E11.40 TYPE 2 DIABETES MELLITUS WITH DIABETIC NEUROPATHY, UNSPECIFIED: ICD-10-CM

## 2022-07-20 DIAGNOSIS — L03.115 CELLULITIS OF RIGHT LOWER LIMB: ICD-10-CM

## 2022-07-20 DIAGNOSIS — Z90.5 ACQUIRED ABSENCE OF KIDNEY: Chronic | ICD-10-CM

## 2022-07-20 DIAGNOSIS — I73.9 PERIPHERAL VASCULAR DISEASE, UNSPECIFIED: ICD-10-CM

## 2022-07-20 DIAGNOSIS — M86.171 OTHER ACUTE OSTEOMYELITIS, RIGHT ANKLE AND FOOT: ICD-10-CM

## 2022-07-20 DIAGNOSIS — L97.522 NON-PRESSURE CHRONIC ULCER OF OTHER PART OF LEFT FOOT WITH FAT LAYER EXPOSED: ICD-10-CM

## 2022-07-20 PROCEDURE — 99213 OFFICE O/P EST LOW 20 MIN: CPT

## 2022-08-23 NOTE — H&P ADULT - SKIN COMMENTS

## 2022-09-14 ENCOUNTER — OUTPATIENT (OUTPATIENT)
Dept: OUTPATIENT SERVICES | Facility: HOSPITAL | Age: 62
LOS: 1 days | End: 2022-09-14
Payer: COMMERCIAL

## 2022-09-14 DIAGNOSIS — I73.9 PERIPHERAL VASCULAR DISEASE, UNSPECIFIED: ICD-10-CM

## 2022-09-14 DIAGNOSIS — Z90.5 ACQUIRED ABSENCE OF KIDNEY: Chronic | ICD-10-CM

## 2022-09-14 DIAGNOSIS — L97.518 NON-PRESSURE CHRONIC ULCER OF OTHER PART OF RIGHT FOOT WITH OTHER SPECIFIED SEVERITY: ICD-10-CM

## 2022-09-14 DIAGNOSIS — L03.115 CELLULITIS OF RIGHT LOWER LIMB: ICD-10-CM

## 2022-09-14 DIAGNOSIS — M86.171 OTHER ACUTE OSTEOMYELITIS, RIGHT ANKLE AND FOOT: ICD-10-CM

## 2022-09-14 DIAGNOSIS — E11.40 TYPE 2 DIABETES MELLITUS WITH DIABETIC NEUROPATHY, UNSPECIFIED: ICD-10-CM

## 2022-09-14 PROCEDURE — 99212 OFFICE O/P EST SF 10 MIN: CPT

## 2023-02-23 NOTE — DIETITIAN INITIAL EVALUATION ADULT. - PERTINENT LABORATORY DATA
04-01    141  |  111<H>  |  19  ----------------------------<  94  4.1   |  25  |  0.74    Ca    9.3      01 Apr 2022 07:03    TPro  7.9  /  Alb  3.2<L>  /  TBili  0.5  /  DBili  x   /  AST  17  /  ALT  32  /  AlkPhos  60  03-31  CAPILLARY BLOOD GLUCOSE      POCT Blood Glucose.: 94 mg/dL (01 Apr 2022 12:51)  POCT Blood Glucose.: 109 mg/dL (01 Apr 2022 08:13)  POCT Blood Glucose.: 93 mg/dL (01 Apr 2022 05:48)  POCT Blood Glucose.: 178 mg/dL (31 Mar 2022 23:14)  POCT Blood Glucose.: 96 mg/dL (31 Mar 2022 19:29) agree with above

## 2023-09-15 NOTE — PRE-OP CHECKLIST - NS PREOP CHK TEST_COVID RESULT_GEN_ALL_CORE
Problem: Chronic Conditions and Co-morbidities  Goal: Patient's chronic conditions and co-morbidity symptoms are monitored and maintained or improved  Outcome: Adequate for Discharge  Flowsheets (Taken 9/15/2023 1143)  Care Plan - Patient's Chronic Conditions and Co-Morbidity Symptoms are Monitored and Maintained or Improved: Monitor and assess patient's chronic conditions and comorbid symptoms for stability, deterioration, or improvement  Note: Patient verbalizes understanding to verbal information given on Reclast, action and possible side effects. Aware to call MD if develop complications. Problem: Safety - Adult  Goal: Free from fall injury  Outcome: Adequate for Discharge  Flowsheets (Taken 9/15/2023 1143)  Free From Fall Injury: Instruct family/caregiver on patient safety  Note: Free from falls while in O.P. Oncology. Discussed the need to use the call light for assistance when getting up to ambulate. Problem: Discharge Planning  Goal: Discharge to home or other facility with appropriate resources  Outcome: Adequate for Discharge  Flowsheets (Taken 9/15/2023 1143)  Discharge to home or other facility with appropriate resources: Identify barriers to discharge with patient and caregiver  Note: Verbalize understanding of discharge instructions, follow up appointments, and when to call Physician. Discuss understanding of discharge instructions, follow up appointments and when to call Physician. Care plan reviewed with patient. Patient verbalizes understanding of the plan of care and contribute to goal setting.
Negative

## 2024-01-02 NOTE — ED ADULT NURSE NOTE - OBJECTIVE STATEMENT
62Y M with PMHx of DMII, HTN, renal carcinoma sp resection. presents to ED for Preop testing prior to R Hallux removal/amputation sx tomorrow in am.   R PICC for long term abx in place. last abx 1hr pta. PICC dressing CDI, pt. ambulatory with assistance. [FreeTextEntry1] : Annual exam Had vaccine [place] UTD  bp good weight discussed on statin check lipid ekg WNL

## 2024-04-18 RX ORDER — GLIMEPIRIDE 1 MG
1 TABLET ORAL
Qty: 0 | Refills: 0 | DISCHARGE

## 2024-04-18 RX ORDER — LISINOPRIL 2.5 MG/1
1 TABLET ORAL
Qty: 0 | Refills: 0 | DISCHARGE

## 2024-04-26 ENCOUNTER — OUTPATIENT (OUTPATIENT)
Dept: INPATIENT UNIT | Facility: HOSPITAL | Age: 64
LOS: 1 days | Discharge: HOME | End: 2024-04-26
Payer: COMMERCIAL

## 2024-04-26 VITALS
TEMPERATURE: 98 F | OXYGEN SATURATION: 100 % | HEART RATE: 75 BPM | DIASTOLIC BLOOD PRESSURE: 83 MMHG | SYSTOLIC BLOOD PRESSURE: 174 MMHG | RESPIRATION RATE: 16 BRPM | HEIGHT: 71 IN | WEIGHT: 199.96 LBS

## 2024-04-26 VITALS
SYSTOLIC BLOOD PRESSURE: 103 MMHG | OXYGEN SATURATION: 98 % | HEART RATE: 75 BPM | TEMPERATURE: 98 F | DIASTOLIC BLOOD PRESSURE: 66 MMHG

## 2024-04-26 DIAGNOSIS — Z90.5 ACQUIRED ABSENCE OF KIDNEY: Chronic | ICD-10-CM

## 2024-04-26 DIAGNOSIS — M20.42 OTHER HAMMER TOE(S) (ACQUIRED), LEFT FOOT: ICD-10-CM

## 2024-04-26 LAB — GLUCOSE BLDC GLUCOMTR-MCNC: 156 MG/DL — HIGH (ref 70–99)

## 2024-04-26 PROCEDURE — 73630 X-RAY EXAM OF FOOT: CPT | Mod: 26,LT

## 2024-04-26 PROCEDURE — 82962 GLUCOSE BLOOD TEST: CPT

## 2024-04-26 PROCEDURE — 76000 FLUOROSCOPY <1 HR PHYS/QHP: CPT

## 2024-04-26 PROCEDURE — C1713: CPT

## 2024-04-26 PROCEDURE — 73630 X-RAY EXAM OF FOOT: CPT | Mod: LT

## 2024-04-26 PROCEDURE — C1769: CPT

## 2024-04-26 RX ORDER — ASCORBIC ACID 60 MG
1 TABLET,CHEWABLE ORAL
Qty: 0 | Refills: 0 | DISCHARGE

## 2024-04-26 RX ORDER — ALOGLIPTIN 12.5 MG/1
1 TABLET, FILM COATED ORAL
Qty: 0 | Refills: 0 | DISCHARGE

## 2024-04-26 RX ORDER — OXYCODONE HYDROCHLORIDE 5 MG/1
10 TABLET ORAL ONCE
Refills: 0 | Status: DISCONTINUED | OUTPATIENT
Start: 2024-04-26 | End: 2024-04-26

## 2024-04-26 RX ORDER — AMLODIPINE BESYLATE 2.5 MG/1
1 TABLET ORAL
Qty: 0 | Refills: 0 | DISCHARGE

## 2024-04-26 RX ORDER — REPAGLINIDE 1 MG/1
1 TABLET ORAL
Qty: 0 | Refills: 0 | DISCHARGE

## 2024-04-26 RX ORDER — METFORMIN HYDROCHLORIDE 850 MG/1
2 TABLET ORAL
Qty: 0 | Refills: 0 | DISCHARGE

## 2024-04-26 RX ORDER — FENTANYL CITRATE 50 UG/ML
25 INJECTION INTRAVENOUS
Refills: 0 | Status: DISCONTINUED | OUTPATIENT
Start: 2024-04-26 | End: 2024-04-26

## 2024-04-26 RX ORDER — INSULIN GLARGINE 100 [IU]/ML
40 INJECTION, SOLUTION SUBCUTANEOUS
Qty: 0 | Refills: 0 | DISCHARGE

## 2024-04-26 RX ORDER — ONDANSETRON 8 MG/1
4 TABLET, FILM COATED ORAL ONCE
Refills: 0 | Status: DISCONTINUED | OUTPATIENT
Start: 2024-04-26 | End: 2024-04-26

## 2024-04-26 RX ORDER — LANOLIN ALCOHOL/MO/W.PET/CERES
1 CREAM (GRAM) TOPICAL
Qty: 0 | Refills: 0 | DISCHARGE

## 2024-04-26 RX ORDER — SODIUM CHLORIDE 9 MG/ML
1000 INJECTION, SOLUTION INTRAVENOUS
Refills: 0 | Status: DISCONTINUED | OUTPATIENT
Start: 2024-04-26 | End: 2024-04-26

## 2024-04-26 NOTE — ASU DISCHARGE PLAN (ADULT/PEDIATRIC) - CARE PROVIDER_API CALL
Kirby Coyle.  Foot Surgery  [address] [facility], [address] [place] [address]  Phone: [phone]  Fax: [phone]  Follow Up Time: 1 week

## 2024-04-26 NOTE — ASU DISCHARGE PLAN (ADULT/PEDIATRIC) - NS MD DC FALL RISK RISK
For information on Fall & Injury Prevention, visit: [email or web address] OR  [email or web address] OR  https://www.cdc.gov/steadi/patient.html

## 2024-04-26 NOTE — ASU DISCHARGE PLAN (ADULT/PEDIATRIC) - ASU DC SPECIAL INSTRUCTIONSFT
Please follow up with your surgeon as scheduled  Please keep dressing clean dry and intact. Do NOT remove until seen in office  Weight bearing as tolerated with camboot  Rest ice and elevate Please follow up with your surgeon as scheduled  Please keep dressing clean dry and intact. Do NOT remove until seen in office  Weight bearing as tolerated with camboot on ambulation and with assistance of a rolling walker  Rest ice and elevate

## 2024-04-26 NOTE — BRIEF OPERATIVE NOTE - NSICDXBRIEFPROCEDURE_GEN_ALL_CORE_FT
PROCEDURES:  Fusion of hammer toe of left foot 26-Apr-2024 14:03:34 Left hallux IPJ fusion Sarai Joaquin

## 2024-05-02 DIAGNOSIS — Z79.84 LONG TERM (CURRENT) USE OF ORAL HYPOGLYCEMIC DRUGS: ICD-10-CM

## 2024-05-02 DIAGNOSIS — Z90.49 ACQUIRED ABSENCE OF OTHER SPECIFIED PARTS OF DIGESTIVE TRACT: ICD-10-CM

## 2024-05-02 DIAGNOSIS — I10 ESSENTIAL (PRIMARY) HYPERTENSION: ICD-10-CM

## 2024-05-02 DIAGNOSIS — M20.42 OTHER HAMMER TOE(S) (ACQUIRED), LEFT FOOT: ICD-10-CM

## 2024-05-02 DIAGNOSIS — Z85.528 PERSONAL HISTORY OF OTHER MALIGNANT NEOPLASM OF KIDNEY: ICD-10-CM

## 2024-05-02 DIAGNOSIS — E11.9 TYPE 2 DIABETES MELLITUS WITHOUT COMPLICATIONS: ICD-10-CM

## 2024-05-02 DIAGNOSIS — Z87.891 PERSONAL HISTORY OF NICOTINE DEPENDENCE: ICD-10-CM

## 2024-11-07 NOTE — ASU PATIENT PROFILE, ADULT - NS TRANSFER PROSTHESIS:
Detail Level: Zone
Prescription Strength Graduated Compression Stockings Recommendations: The patient was counseled that prescription strength graduated compression stockings should be worn for all waking hours. They will follow up with a venous specialist to monitor graduated compression stocking usage and their symptoms.
Detail Level: Detailed
no